# Patient Record
Sex: MALE | Race: WHITE | NOT HISPANIC OR LATINO | Employment: UNEMPLOYED | URBAN - METROPOLITAN AREA
[De-identification: names, ages, dates, MRNs, and addresses within clinical notes are randomized per-mention and may not be internally consistent; named-entity substitution may affect disease eponyms.]

---

## 2022-01-01 ENCOUNTER — APPOINTMENT (OUTPATIENT)
Dept: CT IMAGING | Facility: HOSPITAL | Age: 67
End: 2022-01-01

## 2022-01-01 ENCOUNTER — HOSPITAL ENCOUNTER (INPATIENT)
Facility: HOSPITAL | Age: 67
LOS: 2 days | DRG: 177 | End: 2022-12-25
Attending: INTERNAL MEDICINE | Admitting: HOSPITALIST
Payer: MEDICARE

## 2022-01-01 ENCOUNTER — ANESTHESIA (OUTPATIENT)
Dept: CARDIOLOGY | Facility: HOSPITAL | Age: 67
End: 2022-01-01

## 2022-01-01 ENCOUNTER — APPOINTMENT (OUTPATIENT)
Dept: OTHER | Facility: HOSPITAL | Age: 67
End: 2022-01-01

## 2022-01-01 ENCOUNTER — APPOINTMENT (OUTPATIENT)
Dept: CARDIOLOGY | Facility: HOSPITAL | Age: 67
End: 2022-01-01

## 2022-01-01 ENCOUNTER — HOSPITAL ENCOUNTER (OUTPATIENT)
Facility: HOSPITAL | Age: 67
Setting detail: HOSPITAL OUTPATIENT SURGERY
End: 2022-01-01
Attending: INTERNAL MEDICINE | Admitting: INTERNAL MEDICINE

## 2022-01-01 ENCOUNTER — READMISSION MANAGEMENT (OUTPATIENT)
Dept: CALL CENTER | Facility: HOSPITAL | Age: 67
End: 2022-01-01

## 2022-01-01 ENCOUNTER — HOSPITAL ENCOUNTER (INPATIENT)
Facility: HOSPITAL | Age: 67
LOS: 7 days | Discharge: HOME HEALTH | End: 2022-12-17
Attending: HOSPITALIST | Admitting: INTERNAL MEDICINE

## 2022-01-01 ENCOUNTER — PREP FOR SURGERY (OUTPATIENT)
Dept: OTHER | Facility: HOSPITAL | Age: 67
End: 2022-01-01

## 2022-01-01 ENCOUNTER — APPOINTMENT (OUTPATIENT)
Dept: GENERAL RADIOLOGY | Facility: HOSPITAL | Age: 67
DRG: 177 | End: 2022-01-01
Payer: MEDICARE

## 2022-01-01 ENCOUNTER — APPOINTMENT (OUTPATIENT)
Dept: GENERAL RADIOLOGY | Facility: HOSPITAL | Age: 67
End: 2022-01-01

## 2022-01-01 ENCOUNTER — ANESTHESIA EVENT (OUTPATIENT)
Dept: CARDIOLOGY | Facility: HOSPITAL | Age: 67
End: 2022-01-01

## 2022-01-01 VITALS
SYSTOLIC BLOOD PRESSURE: 100 MMHG | DIASTOLIC BLOOD PRESSURE: 65 MMHG | HEART RATE: 74 BPM | HEIGHT: 67 IN | WEIGHT: 151.68 LBS | RESPIRATION RATE: 12 BRPM | OXYGEN SATURATION: 94 % | BODY MASS INDEX: 23.81 KG/M2 | TEMPERATURE: 97.4 F

## 2022-01-01 VITALS
HEIGHT: 68 IN | OXYGEN SATURATION: 72 % | SYSTOLIC BLOOD PRESSURE: 105 MMHG | RESPIRATION RATE: 24 BRPM | DIASTOLIC BLOOD PRESSURE: 57 MMHG | BODY MASS INDEX: 23.92 KG/M2 | WEIGHT: 157.85 LBS | TEMPERATURE: 99.1 F | HEART RATE: 93 BPM

## 2022-01-01 DIAGNOSIS — I48.91 ATRIAL FIBRILLATION WITH RAPID VENTRICULAR RESPONSE: Primary | ICD-10-CM

## 2022-01-01 DIAGNOSIS — I48.91 ATRIAL FIBRILLATION WITH RAPID VENTRICULAR RESPONSE: ICD-10-CM

## 2022-01-01 DIAGNOSIS — I48.19 PERSISTENT ATRIAL FIBRILLATION: Primary | ICD-10-CM

## 2022-01-01 DIAGNOSIS — Z09 FOLLOW-UP EXAM: ICD-10-CM

## 2022-01-01 DIAGNOSIS — I46.9 CARDIAC ARREST: Primary | ICD-10-CM

## 2022-01-01 LAB
ALBUMIN SERPL-MCNC: 2.7 G/DL (ref 3.5–5.2)
ALBUMIN SERPL-MCNC: 2.9 G/DL (ref 3.5–5.2)
ALBUMIN/GLOB SERPL: 1 G/DL
ALBUMIN/GLOB SERPL: 1.1 G/DL
ALP SERPL-CCNC: 158 U/L (ref 39–117)
ALP SERPL-CCNC: 174 U/L (ref 39–117)
ALT SERPL W P-5'-P-CCNC: 12 U/L (ref 1–41)
ALT SERPL W P-5'-P-CCNC: 17 U/L (ref 1–41)
ANA SER QL IF: NEGATIVE
ANA SER QL: NEGATIVE
ANION GAP SERPL CALCULATED.3IONS-SCNC: 10 MMOL/L (ref 5–15)
ANION GAP SERPL CALCULATED.3IONS-SCNC: 12 MMOL/L (ref 5–15)
ANION GAP SERPL CALCULATED.3IONS-SCNC: 14 MMOL/L (ref 5–15)
ANION GAP SERPL CALCULATED.3IONS-SCNC: 6 MMOL/L (ref 5–15)
ANION GAP SERPL CALCULATED.3IONS-SCNC: 9 MMOL/L (ref 5–15)
ANISOCYTOSIS BLD QL: ABNORMAL
ANISOCYTOSIS BLD QL: ABNORMAL
APTT PPP: 25.9 SECONDS (ref 61–76.5)
APTT PPP: 26.2 SECONDS (ref 61–76.5)
APTT PPP: 27.2 SECONDS (ref 61–76.5)
APTT PPP: 29 SECONDS (ref 61–76.5)
APTT PPP: 29.7 SECONDS (ref 61–76.5)
APTT PPP: 29.9 SECONDS (ref 61–76.5)
APTT PPP: 35.2 SECONDS (ref 61–76.5)
APTT PPP: 39.3 SECONDS (ref 61–76.5)
APTT PPP: 44.1 SECONDS (ref 61–76.5)
APTT PPP: 47.6 SECONDS (ref 61–76.5)
APTT PPP: 51.5 SECONDS (ref 61–76.5)
APTT PPP: 63.2 SECONDS (ref 61–76.5)
APTT PPP: 76.6 SECONDS (ref 61–76.5)
ARTERIAL PATENCY WRIST A: ABNORMAL
ARTERIAL PATENCY WRIST A: ABNORMAL
ARTERIAL PATENCY WRIST A: POSITIVE
AST SERPL-CCNC: 21 U/L (ref 1–40)
AST SERPL-CCNC: 27 U/L (ref 1–40)
ATMOSPHERIC PRESS: ABNORMAL MM[HG]
BACTERIA SPEC RESP CULT: ABNORMAL
BACTERIA SPEC RESP CULT: ABNORMAL
BASE EXCESS BLDA CALC-SCNC: -4.2 MMOL/L (ref 0–3)
BASE EXCESS BLDA CALC-SCNC: -4.5 MMOL/L (ref 0–3)
BASE EXCESS BLDA CALC-SCNC: -8.2 MMOL/L (ref 0–3)
BASOPHILS # BLD AUTO: 0 10*3/MM3 (ref 0–0.2)
BASOPHILS # BLD AUTO: 0 10*3/MM3 (ref 0–0.2)
BASOPHILS NFR BLD AUTO: 0.1 % (ref 0–1.5)
BASOPHILS NFR BLD AUTO: 0.3 % (ref 0–1.5)
BDY SITE: ABNORMAL
BH CV ECHO MEAS - ACS: 1.2 CM
BH CV ECHO MEAS - AI P1/2T: 197 MSEC
BH CV ECHO MEAS - AO MAX PG: 15.6 MMHG
BH CV ECHO MEAS - AO MEAN PG: 9.6 MMHG
BH CV ECHO MEAS - AO ROOT DIAM: 3.4 CM
BH CV ECHO MEAS - AO V2 MAX: 197.8 CM/SEC
BH CV ECHO MEAS - AO V2 VTI: 38.6 CM
BH CV ECHO MEAS - AVA(I,D): 1.49 CM2
BH CV ECHO MEAS - EDV(CUBED): 89.9 ML
BH CV ECHO MEAS - EDV(MOD-SP4): 75.9 ML
BH CV ECHO MEAS - EF(MOD-BP): 44 %
BH CV ECHO MEAS - EF(MOD-SP4): 43.5 %
BH CV ECHO MEAS - ESV(CUBED): 42.9 ML
BH CV ECHO MEAS - ESV(MOD-SP4): 42.8 ML
BH CV ECHO MEAS - FS: 21.9 %
BH CV ECHO MEAS - IVS/LVPW: 0.89 CM
BH CV ECHO MEAS - IVSD: 0.83 CM
BH CV ECHO MEAS - LA DIMENSION: 4.1 CM
BH CV ECHO MEAS - LV DIASTOLIC VOL/BSA (35-75): 41.4 CM2
BH CV ECHO MEAS - LV MASS(C)D: 127.6 GRAMS
BH CV ECHO MEAS - LV MAX PG: 1.53 MMHG
BH CV ECHO MEAS - LV MEAN PG: 0.92 MMHG
BH CV ECHO MEAS - LV SYSTOLIC VOL/BSA (12-30): 23.4 CM2
BH CV ECHO MEAS - LV V1 MAX: 61.8 CM/SEC
BH CV ECHO MEAS - LV V1 VTI: 15.4 CM
BH CV ECHO MEAS - LVIDD: 4.5 CM
BH CV ECHO MEAS - LVIDS: 3.5 CM
BH CV ECHO MEAS - LVOT AREA: 3.7 CM2
BH CV ECHO MEAS - LVOT DIAM: 2.18 CM
BH CV ECHO MEAS - LVPWD: 0.93 CM
BH CV ECHO MEAS - MV A MAX VEL: 47.2 CM/SEC
BH CV ECHO MEAS - MV DEC TIME: 0.14 MSEC
BH CV ECHO MEAS - MV E MAX VEL: 36.3 CM/SEC
BH CV ECHO MEAS - MV E/A: 0.77
BH CV ECHO MEAS - PA V2 MAX: 81.8 CM/SEC
BH CV ECHO MEAS - RAP SYSTOLE: 8 MMHG
BH CV ECHO MEAS - RVDD: 3.6 CM
BH CV ECHO MEAS - RVSP: 22.6 MMHG
BH CV ECHO MEAS - SI(MOD-SP4): 18 ML/M2
BH CV ECHO MEAS - SV(LVOT): 57.6 ML
BH CV ECHO MEAS - SV(MOD-SP4): 33 ML
BH CV ECHO MEAS - TR MAX PG: 14.6 MMHG
BH CV ECHO MEAS - TR MAX PG: 53.5 MMHG
BH CV ECHO MEAS - TR MAX VEL: 191.4 CM/SEC
BH CV ECHO MEAS - TR MAX VEL: 355.9 CM/SEC
BH CV ECHO SHUNT ASSESSMENT PERFORMED (HIDDEN SCRIPTING): 1
BILIRUB SERPL-MCNC: 0.2 MG/DL (ref 0–1.2)
BILIRUB SERPL-MCNC: 0.4 MG/DL (ref 0–1.2)
BLASTS NFR BLD MANUAL: 3 % (ref 0–0)
BUN SERPL-MCNC: 12 MG/DL (ref 8–23)
BUN SERPL-MCNC: 13 MG/DL (ref 8–23)
BUN SERPL-MCNC: 17 MG/DL (ref 8–23)
BUN SERPL-MCNC: 18 MG/DL (ref 8–23)
BUN SERPL-MCNC: 22 MG/DL (ref 8–23)
BUN/CREAT SERPL: 10.9 (ref 7–25)
BUN/CREAT SERPL: 13 (ref 7–25)
BUN/CREAT SERPL: 14.8 (ref 7–25)
BUN/CREAT SERPL: 20 (ref 7–25)
BUN/CREAT SERPL: 21.2 (ref 7–25)
BURR CELLS BLD QL SMEAR: ABNORMAL
C-ANCA TITR SER IF: NORMAL TITER
C3 FRG RBC-MCNC: ABNORMAL
CA-I BLDA-SCNC: 1.22 MMOL/L (ref 1.15–1.33)
CALCIUM SPEC-SCNC: 8.2 MG/DL (ref 8.6–10.5)
CALCIUM SPEC-SCNC: 8.3 MG/DL (ref 8.6–10.5)
CALCIUM SPEC-SCNC: 8.4 MG/DL (ref 8.6–10.5)
CALCIUM SPEC-SCNC: 8.4 MG/DL (ref 8.6–10.5)
CALCIUM SPEC-SCNC: 8.5 MG/DL (ref 8.6–10.5)
CHLORIDE SERPL-SCNC: 101 MMOL/L (ref 98–107)
CHLORIDE SERPL-SCNC: 101 MMOL/L (ref 98–107)
CHLORIDE SERPL-SCNC: 97 MMOL/L (ref 98–107)
CHLORIDE SERPL-SCNC: 98 MMOL/L (ref 98–107)
CHLORIDE SERPL-SCNC: 99 MMOL/L (ref 98–107)
CHOLEST SERPL-MCNC: 101 MG/DL (ref 0–200)
CHROMATIN AB SERPL-ACNC: 475 IU/ML (ref 0–14)
CO2 BLDA-SCNC: 19.4 MMOL/L (ref 22–29)
CO2 BLDA-SCNC: 20.3 MMOL/L (ref 22–29)
CO2 SERPL-SCNC: 19 MMOL/L (ref 22–29)
CO2 SERPL-SCNC: 20 MMOL/L (ref 22–29)
CO2 SERPL-SCNC: 24 MMOL/L (ref 22–29)
CO2 SERPL-SCNC: 24 MMOL/L (ref 22–29)
CO2 SERPL-SCNC: 27 MMOL/L (ref 22–29)
CREAT SERPL-MCNC: 0.9 MG/DL (ref 0.76–1.27)
CREAT SERPL-MCNC: 1 MG/DL (ref 0.76–1.27)
CREAT SERPL-MCNC: 1.04 MG/DL (ref 0.76–1.27)
CREAT SERPL-MCNC: 1.1 MG/DL (ref 0.76–1.27)
CREAT SERPL-MCNC: 1.15 MG/DL (ref 0.76–1.27)
D-LACTATE SERPL-SCNC: 1.3 MMOL/L (ref 0.5–2)
D-LACTATE SERPL-SCNC: 6.3 MMOL/L (ref 0.5–2)
DACRYOCYTES BLD QL SMEAR: ABNORMAL
DACRYOCYTES BLD QL SMEAR: ABNORMAL
DEPRECATED RDW RBC AUTO: 53.4 FL (ref 37–54)
DEPRECATED RDW RBC AUTO: 54.3 FL (ref 37–54)
DEPRECATED RDW RBC AUTO: 55.1 FL (ref 37–54)
DEPRECATED RDW RBC AUTO: 55.6 FL (ref 37–54)
DEPRECATED RDW RBC AUTO: 58.2 FL (ref 37–54)
DEPRECATED RDW RBC AUTO: 59.9 FL (ref 37–54)
DEPRECATED RDW RBC AUTO: 62.6 FL (ref 37–54)
EGFRCR SERPLBLD CKD-EPI 2021: 69.3 ML/MIN/1.73
EGFRCR SERPLBLD CKD-EPI 2021: 73.1 ML/MIN/1.73
EGFRCR SERPLBLD CKD-EPI 2021: 78.2 ML/MIN/1.73
EGFRCR SERPLBLD CKD-EPI 2021: 82 ML/MIN/1.73
EGFRCR SERPLBLD CKD-EPI 2021: 93 ML/MIN/1.73
ENA SCL70 AB SER-ACNC: <0.2 AI (ref 0–0.9)
EOSINOPHIL # BLD AUTO: 0 10*3/MM3 (ref 0–0.4)
EOSINOPHIL # BLD AUTO: 0.3 10*3/MM3 (ref 0–0.4)
EOSINOPHIL NFR BLD AUTO: 0 % (ref 0.3–6.2)
EOSINOPHIL NFR BLD AUTO: 2 % (ref 0.3–6.2)
ERYTHROCYTE [DISTWIDTH] IN BLOOD BY AUTOMATED COUNT: 15.8 % (ref 12.3–15.4)
ERYTHROCYTE [DISTWIDTH] IN BLOOD BY AUTOMATED COUNT: 15.9 % (ref 12.3–15.4)
ERYTHROCYTE [DISTWIDTH] IN BLOOD BY AUTOMATED COUNT: 16 % (ref 12.3–15.4)
ERYTHROCYTE [DISTWIDTH] IN BLOOD BY AUTOMATED COUNT: 16.4 % (ref 12.3–15.4)
ERYTHROCYTE [DISTWIDTH] IN BLOOD BY AUTOMATED COUNT: 16.5 % (ref 12.3–15.4)
ERYTHROCYTE [DISTWIDTH] IN BLOOD BY AUTOMATED COUNT: 16.5 % (ref 12.3–15.4)
ERYTHROCYTE [DISTWIDTH] IN BLOOD BY AUTOMATED COUNT: 17.2 % (ref 12.3–15.4)
GLOBULIN UR ELPH-MCNC: 2.4 GM/DL
GLOBULIN UR ELPH-MCNC: 2.8 GM/DL
GLUCOSE BLDC GLUCOMTR-MCNC: 180 MG/DL (ref 74–100)
GLUCOSE BLDC GLUCOMTR-MCNC: 180 MG/DL (ref 74–100)
GLUCOSE SERPL-MCNC: 157 MG/DL (ref 65–99)
GLUCOSE SERPL-MCNC: 159 MG/DL (ref 65–99)
GLUCOSE SERPL-MCNC: 89 MG/DL (ref 65–99)
GLUCOSE SERPL-MCNC: 92 MG/DL (ref 65–99)
GLUCOSE SERPL-MCNC: 98 MG/DL (ref 65–99)
GRAM STN SPEC: ABNORMAL
HCO3 BLDA-SCNC: 15.5 MMOL/L (ref 21–28)
HCO3 BLDA-SCNC: 18.6 MMOL/L (ref 21–28)
HCO3 BLDA-SCNC: 19.4 MMOL/L (ref 21–28)
HCT VFR BLD AUTO: 23.1 % (ref 37.5–51)
HCT VFR BLD AUTO: 25.3 % (ref 37.5–51)
HCT VFR BLD AUTO: 26.2 % (ref 37.5–51)
HCT VFR BLD AUTO: 27.5 % (ref 37.5–51)
HCT VFR BLD AUTO: 27.8 % (ref 37.5–51)
HCT VFR BLD AUTO: 28.8 % (ref 37.5–51)
HCT VFR BLD AUTO: 30.1 % (ref 37.5–51)
HCT VFR BLDA CALC: 24 % (ref 38–51)
HDLC SERPL-MCNC: 36 MG/DL (ref 40–60)
HEMODILUTION: NO
HGB BLD-MCNC: 7.7 G/DL (ref 13–17.7)
HGB BLD-MCNC: 8.1 G/DL (ref 13–17.7)
HGB BLD-MCNC: 8.7 G/DL (ref 13–17.7)
HGB BLD-MCNC: 8.9 G/DL (ref 13–17.7)
HGB BLD-MCNC: 8.9 G/DL (ref 13–17.7)
HGB BLD-MCNC: 9.7 G/DL (ref 13–17.7)
HGB BLD-MCNC: 9.7 G/DL (ref 13–17.7)
HGB BLDA-MCNC: 8 G/DL (ref 12–17)
HOLD SPECIMEN: NORMAL
INHALED O2 CONCENTRATION: 100 %
INHALED O2 CONCENTRATION: 44 %
INHALED O2 CONCENTRATION: 90 %
INR PPP: 1.08 (ref 0.93–1.1)
IRON 24H UR-MRATE: 20 MCG/DL (ref 59–158)
IRON SATN MFR SERPL: 6 % (ref 20–50)
LAB AP CASE REPORT: NORMAL
LABORATORY COMMENT REPORT: NORMAL
LARGE PLATELETS: ABNORMAL
LDLC SERPL CALC-MCNC: 45 MG/DL (ref 0–100)
LDLC/HDLC SERPL: 1.21 {RATIO}
LV EF 2D ECHO EST: 40 %
LYMPHOCYTES # BLD AUTO: 0.9 10*3/MM3 (ref 0.7–3.1)
LYMPHOCYTES # BLD AUTO: 1.7 10*3/MM3 (ref 0.7–3.1)
LYMPHOCYTES # BLD MANUAL: 1.29 10*3/MM3 (ref 0.7–3.1)
LYMPHOCYTES # BLD MANUAL: 1.53 10*3/MM3 (ref 0.7–3.1)
LYMPHOCYTES # BLD MANUAL: 1.74 10*3/MM3 (ref 0.7–3.1)
LYMPHOCYTES # BLD MANUAL: 3.04 10*3/MM3 (ref 0.7–3.1)
LYMPHOCYTES # BLD MANUAL: 3.8 10*3/MM3 (ref 0.7–3.1)
LYMPHOCYTES NFR BLD AUTO: 12.9 % (ref 19.6–45.3)
LYMPHOCYTES NFR BLD AUTO: 5.7 % (ref 19.6–45.3)
LYMPHOCYTES NFR BLD MANUAL: 2 % (ref 5–12)
LYMPHOCYTES NFR BLD MANUAL: 2 % (ref 5–12)
LYMPHOCYTES NFR BLD MANUAL: 3 % (ref 5–12)
LYMPHOCYTES NFR BLD MANUAL: 5 % (ref 5–12)
LYMPHOCYTES NFR BLD MANUAL: 5 % (ref 5–12)
MAGNESIUM SERPL-MCNC: 1.5 MG/DL (ref 1.6–2.4)
MAGNESIUM SERPL-MCNC: 1.6 MG/DL (ref 1.6–2.4)
MAXIMAL PREDICTED HEART RATE: 152 BPM
MAXIMAL PREDICTED HEART RATE: 152 BPM
MCH RBC QN AUTO: 30.6 PG (ref 26.6–33)
MCH RBC QN AUTO: 30.7 PG (ref 26.6–33)
MCH RBC QN AUTO: 30.7 PG (ref 26.6–33)
MCH RBC QN AUTO: 31.1 PG (ref 26.6–33)
MCH RBC QN AUTO: 31.4 PG (ref 26.6–33)
MCH RBC QN AUTO: 31.6 PG (ref 26.6–33)
MCH RBC QN AUTO: 32.6 PG (ref 26.6–33)
MCHC RBC AUTO-ENTMCNC: 31.9 G/DL (ref 31.5–35.7)
MCHC RBC AUTO-ENTMCNC: 32 G/DL (ref 31.5–35.7)
MCHC RBC AUTO-ENTMCNC: 32.1 G/DL (ref 31.5–35.7)
MCHC RBC AUTO-ENTMCNC: 32.4 G/DL (ref 31.5–35.7)
MCHC RBC AUTO-ENTMCNC: 33.4 G/DL (ref 31.5–35.7)
MCHC RBC AUTO-ENTMCNC: 33.6 G/DL (ref 31.5–35.7)
MCHC RBC AUTO-ENTMCNC: 33.7 G/DL (ref 31.5–35.7)
MCV RBC AUTO: 93.7 FL (ref 79–97)
MCV RBC AUTO: 94.3 FL (ref 79–97)
MCV RBC AUTO: 95.2 FL (ref 79–97)
MCV RBC AUTO: 96 FL (ref 79–97)
MCV RBC AUTO: 96.2 FL (ref 79–97)
MCV RBC AUTO: 96.3 FL (ref 79–97)
MCV RBC AUTO: 96.9 FL (ref 79–97)
METAMYELOCYTES NFR BLD MANUAL: 1 % (ref 0–0)
METAMYELOCYTES NFR BLD MANUAL: 2 % (ref 0–0)
MODALITY: ABNORMAL
MONOCYTES # BLD AUTO: 0.8 10*3/MM3 (ref 0.1–0.9)
MONOCYTES # BLD AUTO: 1.1 10*3/MM3 (ref 0.1–0.9)
MONOCYTES # BLD: 0.43 10*3/MM3 (ref 0.1–0.9)
MONOCYTES # BLD: 0.44 10*3/MM3 (ref 0.1–0.9)
MONOCYTES # BLD: 0.47 10*3/MM3 (ref 0.1–0.9)
MONOCYTES # BLD: 0.58 10*3/MM3 (ref 0.1–0.9)
MONOCYTES # BLD: 0.95 10*3/MM3 (ref 0.1–0.9)
MONOCYTES NFR BLD AUTO: 5.6 % (ref 5–12)
MONOCYTES NFR BLD AUTO: 8.2 % (ref 5–12)
MRSA DNA SPEC QL NAA+PROBE: NORMAL
MYELOPEROXIDASE AB SER IA-ACNC: <0.2 UNITS (ref 0–0.9)
NEUTROPHILS # BLD AUTO: 12.58 10*3/MM3 (ref 1.7–7)
NEUTROPHILS # BLD AUTO: 14.25 10*3/MM3 (ref 1.7–7)
NEUTROPHILS # BLD AUTO: 19.49 10*3/MM3 (ref 1.7–7)
NEUTROPHILS # BLD AUTO: 19.66 10*3/MM3 (ref 1.7–7)
NEUTROPHILS # BLD AUTO: 8.82 10*3/MM3 (ref 1.7–7)
NEUTROPHILS NFR BLD AUTO: 10.2 10*3/MM3 (ref 1.7–7)
NEUTROPHILS NFR BLD AUTO: 13.5 10*3/MM3 (ref 1.7–7)
NEUTROPHILS NFR BLD AUTO: 76.6 % (ref 42.7–76)
NEUTROPHILS NFR BLD AUTO: 88.6 % (ref 42.7–76)
NEUTROPHILS NFR BLD MANUAL: 73 % (ref 42.7–76)
NEUTROPHILS NFR BLD MANUAL: 75 % (ref 42.7–76)
NEUTROPHILS NFR BLD MANUAL: 81 % (ref 42.7–76)
NEUTROPHILS NFR BLD MANUAL: 83 % (ref 42.7–76)
NEUTROPHILS NFR BLD MANUAL: 87 % (ref 42.7–76)
NEUTS BAND NFR BLD MANUAL: 1 % (ref 0–5)
NEUTS BAND NFR BLD MANUAL: 2 % (ref 0–5)
NEUTS BAND NFR BLD MANUAL: 8 % (ref 0–5)
NRBC BLD AUTO-RTO: 0 /100 WBC (ref 0–0.2)
NRBC BLD AUTO-RTO: 0.1 /100 WBC (ref 0–0.2)
NT-PROBNP SERPL-MCNC: 1110 PG/ML (ref 0–900)
NT-PROBNP SERPL-MCNC: 8457 PG/ML (ref 0–900)
OVALOCYTES BLD QL SMEAR: ABNORMAL
P-ANCA ATYPICAL TITR SER IF: NORMAL TITER
P-ANCA TITR SER IF: NORMAL TITER
PATH REPORT.FINAL DX SPEC: NORMAL
PATHOLOGY REVIEW: YES
PCO2 BLDA: 24.7 MM HG (ref 35–48)
PCO2 BLDA: 25.9 MM HG (ref 35–48)
PCO2 BLDA: 30.4 MM HG (ref 35–48)
PH BLDA: 7.41 PH UNITS (ref 7.35–7.45)
PH BLDA: 7.41 PH UNITS (ref 7.35–7.45)
PH BLDA: 7.46 PH UNITS (ref 7.35–7.45)
PLATELET # BLD AUTO: 266 10*3/MM3 (ref 140–450)
PLATELET # BLD AUTO: 268 10*3/MM3 (ref 140–450)
PLATELET # BLD AUTO: 285 10*3/MM3 (ref 140–450)
PLATELET # BLD AUTO: 300 10*3/MM3 (ref 140–450)
PLATELET # BLD AUTO: 305 10*3/MM3 (ref 140–450)
PLATELET # BLD AUTO: 317 10*3/MM3 (ref 140–450)
PLATELET # BLD AUTO: 338 10*3/MM3 (ref 140–450)
PMV BLD AUTO: 7.2 FL (ref 6–12)
PMV BLD AUTO: 7.4 FL (ref 6–12)
PMV BLD AUTO: 7.5 FL (ref 6–12)
PMV BLD AUTO: 7.5 FL (ref 6–12)
PMV BLD AUTO: 7.6 FL (ref 6–12)
PMV BLD AUTO: 7.7 FL (ref 6–12)
PMV BLD AUTO: 7.8 FL (ref 6–12)
PO2 BLDA: 38.5 MM HG (ref 83–108)
PO2 BLDA: 63.4 MM HG (ref 83–108)
PO2 BLDA: 72 MM HG (ref 83–108)
POIKILOCYTOSIS BLD QL SMEAR: ABNORMAL
POLYCHROMASIA BLD QL SMEAR: ABNORMAL
POLYCHROMASIA BLD QL SMEAR: ABNORMAL
POTASSIUM BLDA-SCNC: 4.5 MMOL/L (ref 3.5–4.5)
POTASSIUM SERPL-SCNC: 3.9 MMOL/L (ref 3.5–5.2)
POTASSIUM SERPL-SCNC: 4.1 MMOL/L (ref 3.5–5.2)
POTASSIUM SERPL-SCNC: 4.3 MMOL/L (ref 3.5–5.2)
POTASSIUM SERPL-SCNC: 4.4 MMOL/L (ref 3.5–5.2)
POTASSIUM SERPL-SCNC: 4.4 MMOL/L (ref 3.5–5.2)
PROCALCITONIN SERPL-MCNC: 0.11 NG/ML (ref 0–0.25)
PROMYELOCYTES NFR BLD MANUAL: 1 % (ref 0–0)
PROT SERPL-MCNC: 5.1 G/DL (ref 6–8.5)
PROT SERPL-MCNC: 5.7 G/DL (ref 6–8.5)
PROTEINASE3 AB SER IA-ACNC: <0.2 UNITS (ref 0–0.9)
PROTHROMBIN TIME: 11.1 SECONDS (ref 9.6–11.7)
QT INTERVAL: 377 MS
QT INTERVAL: 395 MS
QT INTERVAL: 398 MS
QT INTERVAL: 400 MS
QT INTERVAL: 487 MS
RBC # BLD AUTO: 2.38 10*6/MM3 (ref 4.14–5.8)
RBC # BLD AUTO: 2.63 10*6/MM3 (ref 4.14–5.8)
RBC # BLD AUTO: 2.78 10*6/MM3 (ref 4.14–5.8)
RBC # BLD AUTO: 2.86 10*6/MM3 (ref 4.14–5.8)
RBC # BLD AUTO: 2.92 10*6/MM3 (ref 4.14–5.8)
RBC # BLD AUTO: 3.07 10*6/MM3 (ref 4.14–5.8)
RBC # BLD AUTO: 3.14 10*6/MM3 (ref 4.14–5.8)
SAO2 % BLDCOA: 74.7 % (ref 94–98)
SAO2 % BLDCOA: 92.5 % (ref 94–98)
SAO2 % BLDCOA: 95.5 % (ref 94–98)
SCAN SLIDE: NORMAL
SMALL PLATELETS BLD QL SMEAR: ADEQUATE
SMALL PLATELETS BLD QL SMEAR: ADEQUATE
SODIUM BLD-SCNC: 133 MMOL/L (ref 138–146)
SODIUM SERPL-SCNC: 129 MMOL/L (ref 136–145)
SODIUM SERPL-SCNC: 131 MMOL/L (ref 136–145)
SODIUM SERPL-SCNC: 132 MMOL/L (ref 136–145)
SODIUM SERPL-SCNC: 134 MMOL/L (ref 136–145)
SODIUM SERPL-SCNC: 135 MMOL/L (ref 136–145)
STRESS TARGET HR: 129 BPM
STRESS TARGET HR: 129 BPM
TIBC SERPL-MCNC: 349 MCG/DL (ref 298–536)
TRANSFERRIN SERPL-MCNC: 234 MG/DL (ref 200–360)
TRIGL SERPL-MCNC: 107 MG/DL (ref 0–150)
TROPONIN T SERPL-MCNC: 0.03 NG/ML (ref 0–0.03)
TROPONIN T SERPL-MCNC: 0.04 NG/ML (ref 0–0.03)
TROPONIN T SERPL-MCNC: 0.06 NG/ML (ref 0–0.03)
TSH SERPL DL<=0.05 MIU/L-ACNC: 2.55 UIU/ML (ref 0.27–4.2)
VARIANT LYMPHS NFR BLD MANUAL: 1 % (ref 0–5)
VARIANT LYMPHS NFR BLD MANUAL: 1 % (ref 0–5)
VARIANT LYMPHS NFR BLD MANUAL: 11 % (ref 19.6–45.3)
VARIANT LYMPHS NFR BLD MANUAL: 14 % (ref 19.6–45.3)
VARIANT LYMPHS NFR BLD MANUAL: 2 % (ref 0–5)
VARIANT LYMPHS NFR BLD MANUAL: 2 % (ref 0–5)
VARIANT LYMPHS NFR BLD MANUAL: 20 % (ref 19.6–45.3)
VARIANT LYMPHS NFR BLD MANUAL: 5 % (ref 19.6–45.3)
VARIANT LYMPHS NFR BLD MANUAL: 8 % (ref 19.6–45.3)
VIT B12 BLD-MCNC: 1377 PG/ML (ref 211–946)
VLDLC SERPL-MCNC: 20 MG/DL (ref 5–40)
WBC MORPH BLD: NORMAL
WBC NRBC COR # BLD: 11.6 10*3/MM3 (ref 3.4–10.8)
WBC NRBC COR # BLD: 13.3 10*3/MM3 (ref 3.4–10.8)
WBC NRBC COR # BLD: 14.3 10*3/MM3 (ref 3.4–10.8)
WBC NRBC COR # BLD: 15.2 10*3/MM3 (ref 3.4–10.8)
WBC NRBC COR # BLD: 19 10*3/MM3 (ref 3.4–10.8)
WBC NRBC COR # BLD: 21.9 10*3/MM3 (ref 3.4–10.8)
WBC NRBC COR # BLD: 23.4 10*3/MM3 (ref 3.4–10.8)

## 2022-01-01 PROCEDURE — 80051 ELECTROLYTE PANEL: CPT

## 2022-01-01 PROCEDURE — 25010000002 MAGNESIUM SULFATE PER 500 MG OF MAGNESIUM

## 2022-01-01 PROCEDURE — 94799 UNLISTED PULMONARY SVC/PX: CPT

## 2022-01-01 PROCEDURE — 25010000002 EPINEPHRINE 1 MG/10ML SOLUTION PREFILLED SYRINGE

## 2022-01-01 PROCEDURE — 25010000002 VANCOMYCIN 10 G RECONSTITUTED SOLUTION: Performed by: HOSPITALIST

## 2022-01-01 PROCEDURE — 85025 COMPLETE CBC W/AUTO DIFF WBC: CPT | Performed by: HOSPITALIST

## 2022-01-01 PROCEDURE — 97116 GAIT TRAINING THERAPY: CPT

## 2022-01-01 PROCEDURE — 83880 ASSAY OF NATRIURETIC PEPTIDE: CPT | Performed by: NURSE PRACTITIONER

## 2022-01-01 PROCEDURE — 25010000002 METHYLPREDNISOLONE PER 40 MG: Performed by: INTERNAL MEDICINE

## 2022-01-01 PROCEDURE — 92960 CARDIOVERSION ELECTRIC EXT: CPT | Performed by: INTERNAL MEDICINE

## 2022-01-01 PROCEDURE — 85007 BL SMEAR W/DIFF WBC COUNT: CPT | Performed by: INTERNAL MEDICINE

## 2022-01-01 PROCEDURE — 25010000002 MAGNESIUM SULFATE 2 GM/50ML SOLUTION: Performed by: INTERNAL MEDICINE

## 2022-01-01 PROCEDURE — 84466 ASSAY OF TRANSFERRIN: CPT | Performed by: NURSE PRACTITIONER

## 2022-01-01 PROCEDURE — 94664 DEMO&/EVAL PT USE INHALER: CPT

## 2022-01-01 PROCEDURE — 83880 ASSAY OF NATRIURETIC PEPTIDE: CPT | Performed by: INTERNAL MEDICINE

## 2022-01-01 PROCEDURE — 25010000002 VANCOMYCIN 1 G RECONSTITUTED SOLUTION 1 EACH VIAL: Performed by: HOSPITALIST

## 2022-01-01 PROCEDURE — 85730 THROMBOPLASTIN TIME PARTIAL: CPT | Performed by: INTERNAL MEDICINE

## 2022-01-01 PROCEDURE — 71045 X-RAY EXAM CHEST 1 VIEW: CPT

## 2022-01-01 PROCEDURE — 87077 CULTURE AEROBIC IDENTIFY: CPT

## 2022-01-01 PROCEDURE — 94761 N-INVAS EAR/PLS OXIMETRY MLT: CPT

## 2022-01-01 PROCEDURE — 83605 ASSAY OF LACTIC ACID: CPT

## 2022-01-01 PROCEDURE — 84484 ASSAY OF TROPONIN QUANT: CPT | Performed by: NURSE PRACTITIONER

## 2022-01-01 PROCEDURE — 70450 CT HEAD/BRAIN W/O DYE: CPT

## 2022-01-01 PROCEDURE — 80048 BASIC METABOLIC PNL TOTAL CA: CPT | Performed by: NURSE PRACTITIONER

## 2022-01-01 PROCEDURE — 97110 THERAPEUTIC EXERCISES: CPT

## 2022-01-01 PROCEDURE — 80053 COMPREHEN METABOLIC PANEL: CPT | Performed by: INTERNAL MEDICINE

## 2022-01-01 PROCEDURE — 94618 PULMONARY STRESS TESTING: CPT

## 2022-01-01 PROCEDURE — 82330 ASSAY OF CALCIUM: CPT

## 2022-01-01 PROCEDURE — 25010000002 DIGOXIN PER 500 MCG: Performed by: INTERNAL MEDICINE

## 2022-01-01 PROCEDURE — 93010 ELECTROCARDIOGRAM REPORT: CPT | Performed by: INTERNAL MEDICINE

## 2022-01-01 PROCEDURE — 25010000002 MORPHINE PER 10 MG: Performed by: INTERNAL MEDICINE

## 2022-01-01 PROCEDURE — 87641 MR-STAPH DNA AMP PROBE: CPT | Performed by: HOSPITALIST

## 2022-01-01 PROCEDURE — 93312 ECHO TRANSESOPHAGEAL: CPT

## 2022-01-01 PROCEDURE — 80048 BASIC METABOLIC PNL TOTAL CA: CPT | Performed by: INTERNAL MEDICINE

## 2022-01-01 PROCEDURE — 25010000002 PROPOFOL 500 MG/50ML EMULSION: Performed by: NURSE ANESTHETIST, CERTIFIED REGISTERED

## 2022-01-01 PROCEDURE — 85025 COMPLETE CBC W/AUTO DIFF WBC: CPT | Performed by: NURSE PRACTITIONER

## 2022-01-01 PROCEDURE — 83036 HEMOGLOBIN GLYCOSYLATED A1C: CPT | Performed by: NURSE PRACTITIONER

## 2022-01-01 PROCEDURE — 87040 BLOOD CULTURE FOR BACTERIA: CPT | Performed by: HOSPITALIST

## 2022-01-01 PROCEDURE — 92960 CARDIOVERSION ELECTRIC EXT: CPT

## 2022-01-01 PROCEDURE — 85025 COMPLETE CBC W/AUTO DIFF WBC: CPT | Performed by: INTERNAL MEDICINE

## 2022-01-01 PROCEDURE — 93005 ELECTROCARDIOGRAM TRACING: CPT | Performed by: NURSE PRACTITIONER

## 2022-01-01 PROCEDURE — 83540 ASSAY OF IRON: CPT | Performed by: NURSE PRACTITIONER

## 2022-01-01 PROCEDURE — 93005 ELECTROCARDIOGRAM TRACING: CPT | Performed by: INTERNAL MEDICINE

## 2022-01-01 PROCEDURE — 5A12012 PERFORMANCE OF CARDIAC OUTPUT, SINGLE, MANUAL: ICD-10-PCS | Performed by: INTERNAL MEDICINE

## 2022-01-01 PROCEDURE — 25010000002 CEFEPIME PER 500 MG: Performed by: HOSPITALIST

## 2022-01-01 PROCEDURE — 63710000001 PREDNISONE PER 1 MG: Performed by: INTERNAL MEDICINE

## 2022-01-01 PROCEDURE — 0BH17EZ INSERTION OF ENDOTRACHEAL AIRWAY INTO TRACHEA, VIA NATURAL OR ARTIFICIAL OPENING: ICD-10-PCS | Performed by: INTERNAL MEDICINE

## 2022-01-01 PROCEDURE — 82607 VITAMIN B-12: CPT | Performed by: NURSE PRACTITIONER

## 2022-01-01 PROCEDURE — 36600 WITHDRAWAL OF ARTERIAL BLOOD: CPT

## 2022-01-01 PROCEDURE — 71250 CT THORAX DX C-: CPT

## 2022-01-01 PROCEDURE — 93312 ECHO TRANSESOPHAGEAL: CPT | Performed by: INTERNAL MEDICINE

## 2022-01-01 PROCEDURE — 83605 ASSAY OF LACTIC ACID: CPT | Performed by: HOSPITALIST

## 2022-01-01 PROCEDURE — 25010000002 SULFUR HEXAFLUORIDE MICROSPH 60.7-25 MG RECONSTITUTED SUSPENSION: Performed by: INTERNAL MEDICINE

## 2022-01-01 PROCEDURE — 92950 HEART/LUNG RESUSCITATION CPR: CPT

## 2022-01-01 PROCEDURE — 93325 DOPPLER ECHO COLOR FLOW MAPG: CPT | Performed by: INTERNAL MEDICINE

## 2022-01-01 PROCEDURE — 93306 TTE W/DOPPLER COMPLETE: CPT

## 2022-01-01 PROCEDURE — 94640 AIRWAY INHALATION TREATMENT: CPT

## 2022-01-01 PROCEDURE — 86235 NUCLEAR ANTIGEN ANTIBODY: CPT

## 2022-01-01 PROCEDURE — 86431 RHEUMATOID FACTOR QUANT: CPT

## 2022-01-01 PROCEDURE — 87186 SC STD MICRODIL/AGAR DIL: CPT

## 2022-01-01 PROCEDURE — 93325 DOPPLER ECHO COLOR FLOW MAPG: CPT

## 2022-01-01 PROCEDURE — 93005 ELECTROCARDIOGRAM TRACING: CPT | Performed by: HOSPITALIST

## 2022-01-01 PROCEDURE — 31500 INSERT EMERGENCY AIRWAY: CPT

## 2022-01-01 PROCEDURE — 80061 LIPID PANEL: CPT | Performed by: NURSE PRACTITIONER

## 2022-01-01 PROCEDURE — 83735 ASSAY OF MAGNESIUM: CPT | Performed by: NURSE PRACTITIONER

## 2022-01-01 PROCEDURE — 82803 BLOOD GASES ANY COMBINATION: CPT

## 2022-01-01 PROCEDURE — 97166 OT EVAL MOD COMPLEX 45 MIN: CPT

## 2022-01-01 PROCEDURE — 86038 ANTINUCLEAR ANTIBODIES: CPT

## 2022-01-01 PROCEDURE — 83516 IMMUNOASSAY NONANTIBODY: CPT

## 2022-01-01 PROCEDURE — 84145 PROCALCITONIN (PCT): CPT | Performed by: INTERNAL MEDICINE

## 2022-01-01 PROCEDURE — 25010000002 AMIODARONE IN DEXTROSE 5% 360-4.14 MG/200ML-% SOLUTION: Performed by: INTERNAL MEDICINE

## 2022-01-01 PROCEDURE — 93306 TTE W/DOPPLER COMPLETE: CPT | Performed by: INTERNAL MEDICINE

## 2022-01-01 PROCEDURE — 25010000002 HEPARIN (PORCINE) 25000-0.45 UT/250ML-% SOLUTION: Performed by: INTERNAL MEDICINE

## 2022-01-01 PROCEDURE — 97162 PT EVAL MOD COMPLEX 30 MIN: CPT

## 2022-01-01 PROCEDURE — 5A2204Z RESTORATION OF CARDIAC RHYTHM, SINGLE: ICD-10-PCS | Performed by: INTERNAL MEDICINE

## 2022-01-01 PROCEDURE — 85018 HEMOGLOBIN: CPT

## 2022-01-01 PROCEDURE — 84484 ASSAY OF TROPONIN QUANT: CPT | Performed by: INTERNAL MEDICINE

## 2022-01-01 PROCEDURE — 86037 ANCA TITER EACH ANTIBODY: CPT

## 2022-01-01 PROCEDURE — 99232 SBSQ HOSP IP/OBS MODERATE 35: CPT | Performed by: INTERNAL MEDICINE

## 2022-01-01 PROCEDURE — 82962 GLUCOSE BLOOD TEST: CPT

## 2022-01-01 PROCEDURE — 87205 SMEAR GRAM STAIN: CPT

## 2022-01-01 PROCEDURE — 25010000002 ONDANSETRON PER 1 MG: Performed by: INTERNAL MEDICINE

## 2022-01-01 PROCEDURE — 99221 1ST HOSP IP/OBS SF/LOW 40: CPT | Performed by: INTERNAL MEDICINE

## 2022-01-01 PROCEDURE — 85610 PROTHROMBIN TIME: CPT | Performed by: INTERNAL MEDICINE

## 2022-01-01 PROCEDURE — 87070 CULTURE OTHR SPECIMN AEROBIC: CPT

## 2022-01-01 PROCEDURE — 25010000002 AMIODARONE PER 30 MG

## 2022-01-01 PROCEDURE — 84443 ASSAY THYROID STIM HORMONE: CPT | Performed by: NURSE PRACTITIONER

## 2022-01-01 PROCEDURE — 80048 BASIC METABOLIC PNL TOTAL CA: CPT | Performed by: HOSPITALIST

## 2022-01-01 RX ORDER — PREGABALIN 75 MG/1
75 CAPSULE ORAL 2 TIMES DAILY
COMMUNITY

## 2022-01-01 RX ORDER — SULFAMETHOXAZOLE AND TRIMETHOPRIM 800; 160 MG/1; MG/1
1 TABLET ORAL EVERY 12 HOURS SCHEDULED
Status: DISCONTINUED | OUTPATIENT
Start: 2022-01-01 | End: 2022-01-01 | Stop reason: HOSPADM

## 2022-01-01 RX ORDER — IPRATROPIUM BROMIDE AND ALBUTEROL SULFATE 2.5; .5 MG/3ML; MG/3ML
3 SOLUTION RESPIRATORY (INHALATION)
Status: DISCONTINUED | OUTPATIENT
Start: 2022-01-01 | End: 2022-01-01

## 2022-01-01 RX ORDER — PROPOFOL 10 MG/ML
INJECTION, EMULSION INTRAVENOUS AS NEEDED
Status: DISCONTINUED | OUTPATIENT
Start: 2022-01-01 | End: 2022-01-01 | Stop reason: SURG

## 2022-01-01 RX ORDER — ROSUVASTATIN CALCIUM 10 MG/1
20 TABLET, COATED ORAL NIGHTLY
Status: DISCONTINUED | OUTPATIENT
Start: 2022-01-01 | End: 2022-01-01 | Stop reason: HOSPADM

## 2022-01-01 RX ORDER — FENTANYL CITRATE 50 UG/ML
INJECTION, SOLUTION INTRAMUSCULAR; INTRAVENOUS
Status: DISCONTINUED
Start: 2022-01-01 | End: 2022-01-01 | Stop reason: WASHOUT

## 2022-01-01 RX ORDER — ALBUTEROL SULFATE 2.5 MG/3ML
2.5 SOLUTION RESPIRATORY (INHALATION) EVERY 6 HOURS PRN
Status: DISCONTINUED | OUTPATIENT
Start: 2022-01-01 | End: 2022-01-01 | Stop reason: HOSPADM

## 2022-01-01 RX ORDER — SODIUM CHLORIDE 9 MG/ML
75 INJECTION, SOLUTION INTRAVENOUS CONTINUOUS
Status: DISCONTINUED | OUTPATIENT
Start: 2022-01-01 | End: 2022-01-01 | Stop reason: HOSPADM

## 2022-01-01 RX ORDER — PREDNISONE 1 MG/1
5 TABLET ORAL DAILY
COMMUNITY

## 2022-01-01 RX ORDER — EZETIMIBE 10 MG/1
10 TABLET ORAL EVERY EVENING
COMMUNITY

## 2022-01-01 RX ORDER — BUPROPION HYDROCHLORIDE 100 MG/1
100 TABLET, EXTENDED RELEASE ORAL DAILY
Status: DISCONTINUED | OUTPATIENT
Start: 2022-01-01 | End: 2022-01-01

## 2022-01-01 RX ORDER — DIGOXIN 0.25 MG/ML
500 INJECTION INTRAMUSCULAR; INTRAVENOUS ONCE
Status: COMPLETED | OUTPATIENT
Start: 2022-01-01 | End: 2022-01-01

## 2022-01-01 RX ORDER — BUPROPION HYDROCHLORIDE 100 MG/1
100 TABLET, EXTENDED RELEASE ORAL DAILY
Status: DISCONTINUED | OUTPATIENT
Start: 2022-01-01 | End: 2022-01-01 | Stop reason: HOSPADM

## 2022-01-01 RX ORDER — PREDNISONE 20 MG/1
40 TABLET ORAL
Status: DISCONTINUED | OUTPATIENT
Start: 2022-01-01 | End: 2022-01-01 | Stop reason: HOSPADM

## 2022-01-01 RX ORDER — SODIUM CHLORIDE 0.9 % (FLUSH) 0.9 %
10 SYRINGE (ML) INJECTION EVERY 12 HOURS SCHEDULED
Status: DISCONTINUED | OUTPATIENT
Start: 2022-01-01 | End: 2022-01-01 | Stop reason: HOSPADM

## 2022-01-01 RX ORDER — ROSUVASTATIN CALCIUM 20 MG/1
20 TABLET, COATED ORAL NIGHTLY
COMMUNITY

## 2022-01-01 RX ORDER — MAGNESIUM SULFATE HEPTAHYDRATE 40 MG/ML
2 INJECTION, SOLUTION INTRAVENOUS AS NEEDED
Status: DISCONTINUED | OUTPATIENT
Start: 2022-01-01 | End: 2022-01-01 | Stop reason: HOSPADM

## 2022-01-01 RX ORDER — DIGOXIN 0.25 MG/ML
250 INJECTION INTRAMUSCULAR; INTRAVENOUS ONCE
Status: COMPLETED | OUTPATIENT
Start: 2022-01-01 | End: 2022-01-01

## 2022-01-01 RX ORDER — ASPIRIN 81 MG/1
81 TABLET ORAL DAILY
Status: DISCONTINUED | OUTPATIENT
Start: 2022-01-01 | End: 2022-01-01 | Stop reason: HOSPADM

## 2022-01-01 RX ORDER — AMIODARONE HYDROCHLORIDE 200 MG/1
200 TABLET ORAL EVERY 12 HOURS SCHEDULED
Status: DISCONTINUED | OUTPATIENT
Start: 2022-01-01 | End: 2022-01-01 | Stop reason: HOSPADM

## 2022-01-01 RX ORDER — RANOLAZINE 1000 MG/1
1000 TABLET, EXTENDED RELEASE ORAL 2 TIMES DAILY
COMMUNITY

## 2022-01-01 RX ORDER — PREDNISONE 1 MG/1
5 TABLET ORAL DAILY
Status: DISCONTINUED | OUTPATIENT
Start: 2022-01-01 | End: 2022-01-01

## 2022-01-01 RX ORDER — PREGABALIN 75 MG/1
75 CAPSULE ORAL 2 TIMES DAILY
Status: DISCONTINUED | OUTPATIENT
Start: 2022-01-01 | End: 2022-01-01 | Stop reason: HOSPADM

## 2022-01-01 RX ORDER — LORAZEPAM 0.5 MG/1
0.5 TABLET ORAL EVERY 6 HOURS PRN
Status: DISCONTINUED | OUTPATIENT
Start: 2022-01-01 | End: 2022-01-01 | Stop reason: HOSPADM

## 2022-01-01 RX ORDER — PANTOPRAZOLE SODIUM 40 MG/1
40 TABLET, DELAYED RELEASE ORAL 2 TIMES DAILY
Status: DISCONTINUED | OUTPATIENT
Start: 2022-01-01 | End: 2022-01-01 | Stop reason: HOSPADM

## 2022-01-01 RX ORDER — ONDANSETRON 2 MG/ML
4 INJECTION INTRAMUSCULAR; INTRAVENOUS EVERY 6 HOURS PRN
Status: DISCONTINUED | OUTPATIENT
Start: 2022-01-01 | End: 2022-01-01 | Stop reason: HOSPADM

## 2022-01-01 RX ORDER — AMIODARONE HYDROCHLORIDE 50 MG/ML
INJECTION, SOLUTION INTRAVENOUS
Status: COMPLETED | OUTPATIENT
Start: 2022-01-01 | End: 2022-01-01

## 2022-01-01 RX ORDER — FLUOXETINE HYDROCHLORIDE 20 MG/1
40 CAPSULE ORAL DAILY
Status: DISCONTINUED | OUTPATIENT
Start: 2022-01-01 | End: 2022-01-01 | Stop reason: HOSPADM

## 2022-01-01 RX ORDER — GUAIFENESIN 600 MG/1
600 TABLET, EXTENDED RELEASE ORAL DAILY
COMMUNITY

## 2022-01-01 RX ORDER — MORPHINE SULFATE 2 MG/ML
2 INJECTION, SOLUTION INTRAMUSCULAR; INTRAVENOUS
Status: DISCONTINUED | OUTPATIENT
Start: 2022-01-01 | End: 2022-01-01 | Stop reason: SDUPTHER

## 2022-01-01 RX ORDER — GUAIFENESIN 600 MG/1
600 TABLET, EXTENDED RELEASE ORAL DAILY
Status: DISCONTINUED | OUTPATIENT
Start: 2022-01-01 | End: 2022-01-01 | Stop reason: HOSPADM

## 2022-01-01 RX ORDER — RANOLAZINE 500 MG/1
1000 TABLET, EXTENDED RELEASE ORAL 2 TIMES DAILY
Status: DISCONTINUED | OUTPATIENT
Start: 2022-01-01 | End: 2022-01-01 | Stop reason: HOSPADM

## 2022-01-01 RX ORDER — CALCIUM CARBONATE 200(500)MG
2 TABLET,CHEWABLE ORAL 3 TIMES DAILY PRN
Status: DISCONTINUED | OUTPATIENT
Start: 2022-01-01 | End: 2022-01-01 | Stop reason: HOSPADM

## 2022-01-01 RX ORDER — GUAIFENESIN 600 MG/1
600 TABLET, EXTENDED RELEASE ORAL DAILY
Status: DISCONTINUED | OUTPATIENT
Start: 2022-01-01 | End: 2022-01-01

## 2022-01-01 RX ORDER — MORPHINE SULFATE 2 MG/ML
2 INJECTION, SOLUTION INTRAMUSCULAR; INTRAVENOUS EVERY 4 HOURS PRN
Status: DISCONTINUED | OUTPATIENT
Start: 2022-01-01 | End: 2022-01-01 | Stop reason: HOSPADM

## 2022-01-01 RX ORDER — SODIUM CHLORIDE 9 MG/ML
40 INJECTION, SOLUTION INTRAVENOUS AS NEEDED
Status: DISCONTINUED | OUTPATIENT
Start: 2022-01-01 | End: 2022-01-01 | Stop reason: HOSPADM

## 2022-01-01 RX ORDER — DOCUSATE SODIUM 100 MG/1
100 CAPSULE, LIQUID FILLED ORAL DAILY PRN
Status: DISCONTINUED | OUTPATIENT
Start: 2022-01-01 | End: 2022-01-01 | Stop reason: HOSPADM

## 2022-01-01 RX ORDER — ACETAMINOPHEN 160 MG/5ML
650 SOLUTION ORAL EVERY 4 HOURS PRN
Status: DISCONTINUED | OUTPATIENT
Start: 2022-01-01 | End: 2022-01-01 | Stop reason: HOSPADM

## 2022-01-01 RX ORDER — MAGNESIUM SULFATE HEPTAHYDRATE 500 MG/ML
INJECTION, SOLUTION INTRAMUSCULAR; INTRAVENOUS
Status: COMPLETED | OUTPATIENT
Start: 2022-01-01 | End: 2022-01-01

## 2022-01-01 RX ORDER — LISINOPRIL 5 MG/1
5 TABLET ORAL DAILY
Qty: 30 TABLET | Refills: 2 | Status: SHIPPED | OUTPATIENT
Start: 2022-01-01 | End: 2023-03-17

## 2022-01-01 RX ORDER — MIDAZOLAM HYDROCHLORIDE 1 MG/ML
INJECTION INTRAMUSCULAR; INTRAVENOUS
Status: DISCONTINUED
Start: 2022-01-01 | End: 2022-01-01 | Stop reason: WASHOUT

## 2022-01-01 RX ORDER — SULFAMETHOXAZOLE AND TRIMETHOPRIM 800; 160 MG/1; MG/1
1 TABLET ORAL EVERY 12 HOURS SCHEDULED
Qty: 5 TABLET | Refills: 0 | Status: SHIPPED | OUTPATIENT
Start: 2022-01-01 | End: 2022-01-01

## 2022-01-01 RX ORDER — FERROUS SULFATE TAB EC 324 MG (65 MG FE EQUIVALENT) 324 (65 FE) MG
324 TABLET DELAYED RESPONSE ORAL
Status: DISCONTINUED | OUTPATIENT
Start: 2022-01-01 | End: 2022-01-01 | Stop reason: HOSPADM

## 2022-01-01 RX ORDER — SODIUM CHLORIDE 9 MG/ML
30 INJECTION, SOLUTION INTRAVENOUS CONTINUOUS
Status: DISCONTINUED | OUTPATIENT
Start: 2022-01-01 | End: 2022-01-01 | Stop reason: HOSPADM

## 2022-01-01 RX ORDER — TAMSULOSIN HYDROCHLORIDE 0.4 MG/1
0.4 CAPSULE ORAL DAILY
Status: DISCONTINUED | OUTPATIENT
Start: 2022-01-01 | End: 2022-01-01 | Stop reason: HOSPADM

## 2022-01-01 RX ORDER — BUPROPION HYDROCHLORIDE 100 MG/1
100 TABLET, EXTENDED RELEASE ORAL DAILY
COMMUNITY

## 2022-01-01 RX ORDER — LANOLIN ALCOHOL/MO/W.PET/CERES
1000 CREAM (GRAM) TOPICAL DAILY
Status: DISCONTINUED | OUTPATIENT
Start: 2022-01-01 | End: 2022-01-01 | Stop reason: HOSPADM

## 2022-01-01 RX ORDER — METHYLPREDNISOLONE SODIUM SUCCINATE 40 MG/ML
40 INJECTION, POWDER, LYOPHILIZED, FOR SOLUTION INTRAMUSCULAR; INTRAVENOUS EVERY 12 HOURS
Status: DISCONTINUED | OUTPATIENT
Start: 2022-01-01 | End: 2022-01-01

## 2022-01-01 RX ORDER — TAMSULOSIN HYDROCHLORIDE 0.4 MG/1
1 CAPSULE ORAL DAILY
COMMUNITY

## 2022-01-01 RX ORDER — CALCIUM CHLORIDE 100 MG/ML
INJECTION INTRAVENOUS; INTRAVENTRICULAR
Status: COMPLETED | OUTPATIENT
Start: 2022-01-01 | End: 2022-01-01

## 2022-01-01 RX ORDER — ETOMIDATE 2 MG/ML
INJECTION INTRAVENOUS
Status: DISCONTINUED
Start: 2022-01-01 | End: 2022-01-01 | Stop reason: WASHOUT

## 2022-01-01 RX ORDER — EPINEPHRINE 0.1 MG/ML
SYRINGE (ML) INJECTION
Status: COMPLETED | OUTPATIENT
Start: 2022-01-01 | End: 2022-01-01

## 2022-01-01 RX ORDER — FLUOXETINE HYDROCHLORIDE 20 MG/1
40 CAPSULE ORAL DAILY
Status: DISCONTINUED | OUTPATIENT
Start: 2022-01-01 | End: 2022-01-01

## 2022-01-01 RX ORDER — HEPARIN SODIUM 10000 [USP'U]/100ML
12 INJECTION, SOLUTION INTRAVENOUS
Status: DISCONTINUED | OUTPATIENT
Start: 2022-01-01 | End: 2022-01-01

## 2022-01-01 RX ORDER — SODIUM CHLORIDE 0.9 % (FLUSH) 0.9 %
10 SYRINGE (ML) INJECTION AS NEEDED
Status: DISCONTINUED | OUTPATIENT
Start: 2022-01-01 | End: 2022-01-01 | Stop reason: HOSPADM

## 2022-01-01 RX ORDER — POTASSIUM CHLORIDE 20 MEQ/1
40 TABLET, EXTENDED RELEASE ORAL AS NEEDED
Status: DISCONTINUED | OUTPATIENT
Start: 2022-01-01 | End: 2022-01-01 | Stop reason: HOSPADM

## 2022-01-01 RX ORDER — FLUOXETINE HYDROCHLORIDE 40 MG/1
40 CAPSULE ORAL DAILY
COMMUNITY

## 2022-01-01 RX ORDER — ACETAMINOPHEN 650 MG/1
650 SUPPOSITORY RECTAL EVERY 4 HOURS PRN
Status: DISCONTINUED | OUTPATIENT
Start: 2022-01-01 | End: 2022-01-01 | Stop reason: HOSPADM

## 2022-01-01 RX ORDER — LIDOCAINE HYDROCHLORIDE 20 MG/ML
INJECTION, SOLUTION EPIDURAL; INFILTRATION; INTRACAUDAL; PERINEURAL AS NEEDED
Status: DISCONTINUED | OUTPATIENT
Start: 2022-01-01 | End: 2022-01-01 | Stop reason: SURG

## 2022-01-01 RX ORDER — ASPIRIN 81 MG/1
81 TABLET ORAL DAILY
COMMUNITY

## 2022-01-01 RX ORDER — POTASSIUM CHLORIDE 1.5 G/1.77G
40 POWDER, FOR SOLUTION ORAL AS NEEDED
Status: DISCONTINUED | OUTPATIENT
Start: 2022-01-01 | End: 2022-01-01 | Stop reason: HOSPADM

## 2022-01-01 RX ORDER — SODIUM CHLORIDE 9 MG/ML
INJECTION, SOLUTION INTRAVENOUS CONTINUOUS PRN
Status: DISCONTINUED | OUTPATIENT
Start: 2022-01-01 | End: 2022-01-01 | Stop reason: SURG

## 2022-01-01 RX ORDER — PANTOPRAZOLE SODIUM 40 MG/1
40 TABLET, DELAYED RELEASE ORAL 2 TIMES DAILY
COMMUNITY

## 2022-01-01 RX ORDER — AMIODARONE HYDROCHLORIDE 200 MG/1
200 TABLET ORAL EVERY 12 HOURS SCHEDULED
Qty: 60 TABLET | Refills: 2 | Status: SHIPPED | OUTPATIENT
Start: 2022-01-01 | End: 2023-03-17

## 2022-01-01 RX ORDER — NITROGLYCERIN 0.4 MG/1
0.4 TABLET SUBLINGUAL
Status: DISCONTINUED | OUTPATIENT
Start: 2022-01-01 | End: 2022-01-01 | Stop reason: HOSPADM

## 2022-01-01 RX ORDER — AMIODARONE HYDROCHLORIDE 50 MG/ML
INJECTION, SOLUTION INTRAVENOUS
Status: DISCONTINUED
Start: 2022-01-01 | End: 2022-01-01 | Stop reason: HOSPADM

## 2022-01-01 RX ORDER — MAGNESIUM SULFATE HEPTAHYDRATE 40 MG/ML
4 INJECTION, SOLUTION INTRAVENOUS AS NEEDED
Status: DISCONTINUED | OUTPATIENT
Start: 2022-01-01 | End: 2022-01-01 | Stop reason: HOSPADM

## 2022-01-01 RX ORDER — IPRATROPIUM BROMIDE AND ALBUTEROL SULFATE 2.5; .5 MG/3ML; MG/3ML
3 SOLUTION RESPIRATORY (INHALATION) EVERY 6 HOURS PRN
Status: DISCONTINUED | OUTPATIENT
Start: 2022-01-01 | End: 2022-01-01 | Stop reason: HOSPADM

## 2022-01-01 RX ORDER — ASPIRIN 81 MG/1
81 TABLET ORAL DAILY
Status: DISCONTINUED | OUTPATIENT
Start: 2022-01-01 | End: 2022-01-01

## 2022-01-01 RX ORDER — HYDROCODONE BITARTRATE AND ACETAMINOPHEN 7.5; 325 MG/1; MG/1
1 TABLET ORAL EVERY 6 HOURS PRN
COMMUNITY

## 2022-01-01 RX ORDER — AMIODARONE HYDROCHLORIDE 200 MG/1
200 TABLET ORAL EVERY 12 HOURS SCHEDULED
Status: DISCONTINUED | OUTPATIENT
Start: 2022-01-01 | End: 2022-01-01

## 2022-01-01 RX ORDER — HYDROCODONE BITARTRATE AND ACETAMINOPHEN 10; 325 MG/1; MG/1
1 TABLET ORAL EVERY 4 HOURS PRN
Status: DISCONTINUED | OUTPATIENT
Start: 2022-01-01 | End: 2022-01-01 | Stop reason: HOSPADM

## 2022-01-01 RX ORDER — HYDROCODONE BITARTRATE AND ACETAMINOPHEN 7.5; 325 MG/1; MG/1
1 TABLET ORAL EVERY 6 HOURS PRN
Status: DISCONTINUED | OUTPATIENT
Start: 2022-01-01 | End: 2022-01-01 | Stop reason: HOSPADM

## 2022-01-01 RX ORDER — ACETAMINOPHEN 325 MG/1
650 TABLET ORAL EVERY 4 HOURS PRN
Status: DISCONTINUED | OUTPATIENT
Start: 2022-01-01 | End: 2022-01-01 | Stop reason: HOSPADM

## 2022-01-01 RX ORDER — DOCUSATE SODIUM 100 MG/1
100 CAPSULE, LIQUID FILLED ORAL DAILY PRN
COMMUNITY

## 2022-01-01 RX ORDER — FERROUS SULFATE 325(65) MG
325 TABLET ORAL
COMMUNITY

## 2022-01-01 RX ORDER — LANOLIN ALCOHOL/MO/W.PET/CERES
1000 CREAM (GRAM) TOPICAL DAILY
COMMUNITY

## 2022-01-01 RX ORDER — IPRATROPIUM BROMIDE AND ALBUTEROL SULFATE 2.5; .5 MG/3ML; MG/3ML
3 SOLUTION RESPIRATORY (INHALATION)
Status: DISCONTINUED | OUTPATIENT
Start: 2022-01-01 | End: 2022-01-01 | Stop reason: HOSPADM

## 2022-01-01 RX ORDER — ALBUTEROL SULFATE 90 UG/1
1 AEROSOL, METERED RESPIRATORY (INHALATION) EVERY 4 HOURS PRN
COMMUNITY

## 2022-01-01 RX ADMIN — RANOLAZINE 1000 MG: 500 TABLET, FILM COATED, EXTENDED RELEASE ORAL at 07:53

## 2022-01-01 RX ADMIN — AMIODARONE HYDROCHLORIDE 200 MG: 200 TABLET ORAL at 09:04

## 2022-01-01 RX ADMIN — FERROUS SULFATE TAB EC 324 MG (65 MG FE EQUIVALENT) 324 MG: 324 (65 FE) TABLET DELAYED RESPONSE at 10:03

## 2022-01-01 RX ADMIN — CEFEPIME 2 G: 2 INJECTION, POWDER, FOR SOLUTION INTRAVENOUS at 13:45

## 2022-01-01 RX ADMIN — CEFEPIME 2 G: 2 INJECTION, POWDER, FOR SOLUTION INTRAVENOUS at 05:30

## 2022-01-01 RX ADMIN — Medication 10 ML: at 10:00

## 2022-01-01 RX ADMIN — GUAIFENESIN 600 MG: 600 TABLET, EXTENDED RELEASE ORAL at 10:54

## 2022-01-01 RX ADMIN — MORPHINE SULFATE 2 MG: 2 INJECTION, SOLUTION INTRAMUSCULAR; INTRAVENOUS at 05:24

## 2022-01-01 RX ADMIN — ASPIRIN 81 MG: 81 TABLET, COATED ORAL at 08:50

## 2022-01-01 RX ADMIN — TAMSULOSIN HYDROCHLORIDE 0.4 MG: 0.4 CAPSULE ORAL at 08:49

## 2022-01-01 RX ADMIN — FLUOXETINE 40 MG: 20 CAPSULE ORAL at 08:49

## 2022-01-01 RX ADMIN — PROPOFOL 50 MG: 10 INJECTION, EMULSION INTRAVENOUS at 07:45

## 2022-01-01 RX ADMIN — SODIUM CHLORIDE 30 ML/HR: 9 INJECTION, SOLUTION INTRAVENOUS at 07:13

## 2022-01-01 RX ADMIN — FERROUS SULFATE TAB EC 324 MG (65 MG FE EQUIVALENT) 324 MG: 324 (65 FE) TABLET DELAYED RESPONSE at 10:54

## 2022-01-01 RX ADMIN — GUAIFENESIN 600 MG: 600 TABLET, EXTENDED RELEASE ORAL at 08:47

## 2022-01-01 RX ADMIN — ASPIRIN 81 MG: 81 TABLET, COATED ORAL at 10:02

## 2022-01-01 RX ADMIN — AMIODARONE HYDROCHLORIDE 200 MG: 200 TABLET ORAL at 21:05

## 2022-01-01 RX ADMIN — IPRATROPIUM BROMIDE AND ALBUTEROL SULFATE 3 ML: 2.5; .5 SOLUTION RESPIRATORY (INHALATION) at 15:03

## 2022-01-01 RX ADMIN — Medication 10 ML: at 21:21

## 2022-01-01 RX ADMIN — IPRATROPIUM BROMIDE AND ALBUTEROL SULFATE 3 ML: 2.5; .5 SOLUTION RESPIRATORY (INHALATION) at 15:00

## 2022-01-01 RX ADMIN — PANTOPRAZOLE SODIUM 40 MG: 40 TABLET, DELAYED RELEASE ORAL at 08:10

## 2022-01-01 RX ADMIN — EPINEPHRINE 1 MG: 0.1 INJECTION, SOLUTION ENDOTRACHEAL; INTRACARDIAC; INTRAVENOUS at 00:26

## 2022-01-01 RX ADMIN — AMIODARONE HYDROCHLORIDE 200 MG: 200 TABLET ORAL at 08:06

## 2022-01-01 RX ADMIN — MAGNESIUM SULFATE HEPTAHYDRATE 2 G: 2 INJECTION, SOLUTION INTRAVENOUS at 17:21

## 2022-01-01 RX ADMIN — CEFEPIME 2 G: 2 INJECTION, POWDER, FOR SOLUTION INTRAVENOUS at 21:04

## 2022-01-01 RX ADMIN — SODIUM CHLORIDE 75 ML/HR: 9 INJECTION, SOLUTION INTRAVENOUS at 13:59

## 2022-01-01 RX ADMIN — AMIODARONE HYDROCHLORIDE 200 MG: 200 TABLET ORAL at 10:55

## 2022-01-01 RX ADMIN — PROPOFOL 50 MG: 10 INJECTION, EMULSION INTRAVENOUS at 07:46

## 2022-01-01 RX ADMIN — ROSUVASTATIN 20 MG: 10 TABLET, FILM COATED ORAL at 20:31

## 2022-01-01 RX ADMIN — DIGOXIN 250 MCG: 0.25 INJECTION INTRAMUSCULAR; INTRAVENOUS at 21:19

## 2022-01-01 RX ADMIN — NYSTATIN 500000 UNITS: 100000 SUSPENSION ORAL at 20:02

## 2022-01-01 RX ADMIN — PANTOPRAZOLE SODIUM 40 MG: 40 TABLET, DELAYED RELEASE ORAL at 10:54

## 2022-01-01 RX ADMIN — IPRATROPIUM BROMIDE AND ALBUTEROL SULFATE 3 ML: .5; 3 SOLUTION RESPIRATORY (INHALATION) at 20:06

## 2022-01-01 RX ADMIN — BUPROPION HYDROCHLORIDE 100 MG: 100 TABLET, FILM COATED, EXTENDED RELEASE ORAL at 08:15

## 2022-01-01 RX ADMIN — PANTOPRAZOLE SODIUM 40 MG: 40 TABLET, DELAYED RELEASE ORAL at 21:05

## 2022-01-01 RX ADMIN — BUPROPION HYDROCHLORIDE 100 MG: 100 TABLET, FILM COATED, EXTENDED RELEASE ORAL at 10:02

## 2022-01-01 RX ADMIN — FERROUS SULFATE TAB EC 324 MG (65 MG FE EQUIVALENT) 324 MG: 324 (65 FE) TABLET DELAYED RESPONSE at 08:10

## 2022-01-01 RX ADMIN — RANOLAZINE 1000 MG: 500 TABLET, FILM COATED, EXTENDED RELEASE ORAL at 21:20

## 2022-01-01 RX ADMIN — TAMSULOSIN HYDROCHLORIDE 0.4 MG: 0.4 CAPSULE ORAL at 08:47

## 2022-01-01 RX ADMIN — IPRATROPIUM BROMIDE AND ALBUTEROL SULFATE 3 ML: 2.5; .5 SOLUTION RESPIRATORY (INHALATION) at 19:17

## 2022-01-01 RX ADMIN — IPRATROPIUM BROMIDE AND ALBUTEROL SULFATE 3 ML: 2.5; .5 SOLUTION RESPIRATORY (INHALATION) at 11:40

## 2022-01-01 RX ADMIN — SULFAMETHOXAZOLE AND TRIMETHOPRIM 1 TABLET: 800; 160 TABLET ORAL at 20:11

## 2022-01-01 RX ADMIN — METHYLPREDNISOLONE SODIUM SUCCINATE 40 MG: 40 INJECTION, POWDER, FOR SOLUTION INTRAMUSCULAR; INTRAVENOUS at 05:37

## 2022-01-01 RX ADMIN — RANOLAZINE 1000 MG: 500 TABLET, FILM COATED, EXTENDED RELEASE ORAL at 20:34

## 2022-01-01 RX ADMIN — BUPROPION HYDROCHLORIDE 100 MG: 100 TABLET, FILM COATED, EXTENDED RELEASE ORAL at 08:09

## 2022-01-01 RX ADMIN — RIVAROXABAN 20 MG: 20 TABLET, FILM COATED ORAL at 15:38

## 2022-01-01 RX ADMIN — FLUOXETINE 40 MG: 20 CAPSULE ORAL at 08:15

## 2022-01-01 RX ADMIN — PREGABALIN 75 MG: 75 CAPSULE ORAL at 21:20

## 2022-01-01 RX ADMIN — Medication 10 ML: at 08:16

## 2022-01-01 RX ADMIN — IPRATROPIUM BROMIDE AND ALBUTEROL SULFATE 3 ML: 2.5; .5 SOLUTION RESPIRATORY (INHALATION) at 15:10

## 2022-01-01 RX ADMIN — MAGNESIUM SULFATE HEPTAHYDRATE 1 G: 500 INJECTION, SOLUTION INTRAMUSCULAR; INTRAVENOUS at 00:42

## 2022-01-01 RX ADMIN — HYDROCODONE BITARTRATE AND ACETAMINOPHEN 1 TABLET: 10; 325 TABLET ORAL at 08:05

## 2022-01-01 RX ADMIN — SULFAMETHOXAZOLE AND TRIMETHOPRIM 1 TABLET: 800; 160 TABLET ORAL at 08:15

## 2022-01-01 RX ADMIN — ASPIRIN 81 MG: 81 TABLET, COATED ORAL at 08:06

## 2022-01-01 RX ADMIN — PANTOPRAZOLE SODIUM 40 MG: 40 TABLET, DELAYED RELEASE ORAL at 10:03

## 2022-01-01 RX ADMIN — HYDROCODONE BITARTRATE AND ACETAMINOPHEN 1 TABLET: 10; 325 TABLET ORAL at 23:55

## 2022-01-01 RX ADMIN — METHYLPREDNISOLONE SODIUM SUCCINATE 40 MG: 40 INJECTION, POWDER, FOR SOLUTION INTRAMUSCULAR; INTRAVENOUS at 05:21

## 2022-01-01 RX ADMIN — IPRATROPIUM BROMIDE AND ALBUTEROL SULFATE 3 ML: .5; 3 SOLUTION RESPIRATORY (INHALATION) at 12:05

## 2022-01-01 RX ADMIN — PREGABALIN 75 MG: 75 CAPSULE ORAL at 10:02

## 2022-01-01 RX ADMIN — FLUOXETINE 40 MG: 20 CAPSULE ORAL at 08:07

## 2022-01-01 RX ADMIN — VANCOMYCIN HYDROCHLORIDE 1500 MG: 10 INJECTION, POWDER, LYOPHILIZED, FOR SOLUTION INTRAVENOUS at 15:53

## 2022-01-01 RX ADMIN — FERROUS SULFATE TAB EC 324 MG (65 MG FE EQUIVALENT) 324 MG: 324 (65 FE) TABLET DELAYED RESPONSE at 08:47

## 2022-01-01 RX ADMIN — AMIODARONE HYDROCHLORIDE 200 MG: 200 TABLET ORAL at 21:20

## 2022-01-01 RX ADMIN — IPRATROPIUM BROMIDE AND ALBUTEROL SULFATE 3 ML: 2.5; .5 SOLUTION RESPIRATORY (INHALATION) at 14:50

## 2022-01-01 RX ADMIN — RANOLAZINE 1000 MG: 500 TABLET, FILM COATED, EXTENDED RELEASE ORAL at 08:49

## 2022-01-01 RX ADMIN — ACETAMINOPHEN 650 MG: 325 TABLET, FILM COATED ORAL at 05:34

## 2022-01-01 RX ADMIN — METHYLPREDNISOLONE SODIUM SUCCINATE 40 MG: 40 INJECTION, POWDER, FOR SOLUTION INTRAMUSCULAR; INTRAVENOUS at 17:59

## 2022-01-01 RX ADMIN — PANTOPRAZOLE SODIUM 40 MG: 40 TABLET, DELAYED RELEASE ORAL at 20:02

## 2022-01-01 RX ADMIN — ROSUVASTATIN 20 MG: 10 TABLET, FILM COATED ORAL at 21:05

## 2022-01-01 RX ADMIN — IPRATROPIUM BROMIDE AND ALBUTEROL SULFATE 3 ML: 2.5; .5 SOLUTION RESPIRATORY (INHALATION) at 10:48

## 2022-01-01 RX ADMIN — RIVAROXABAN 20 MG: 20 TABLET, FILM COATED ORAL at 17:22

## 2022-01-01 RX ADMIN — FERROUS SULFATE TAB EC 324 MG (65 MG FE EQUIVALENT) 324 MG: 324 (65 FE) TABLET DELAYED RESPONSE at 08:16

## 2022-01-01 RX ADMIN — Medication 10 ML: at 20:31

## 2022-01-01 RX ADMIN — METOPROLOL TARTRATE 25 MG: 25 TABLET, FILM COATED ORAL at 08:09

## 2022-01-01 RX ADMIN — HYDROCODONE BITARTRATE AND ACETAMINOPHEN 1 TABLET: 10; 325 TABLET ORAL at 04:02

## 2022-01-01 RX ADMIN — PROPOFOL 50 MG: 10 INJECTION, EMULSION INTRAVENOUS at 07:49

## 2022-01-01 RX ADMIN — AMIODARONE HYDROCHLORIDE 200 MG: 200 TABLET ORAL at 08:16

## 2022-01-01 RX ADMIN — HYDROCODONE BITARTRATE AND ACETAMINOPHEN 1 TABLET: 10; 325 TABLET ORAL at 08:53

## 2022-01-01 RX ADMIN — RIVAROXABAN 20 MG: 20 TABLET, FILM COATED ORAL at 10:55

## 2022-01-01 RX ADMIN — RANOLAZINE 1000 MG: 500 TABLET, FILM COATED, EXTENDED RELEASE ORAL at 10:56

## 2022-01-01 RX ADMIN — SULFAMETHOXAZOLE AND TRIMETHOPRIM 1 TABLET: 800; 160 TABLET ORAL at 08:49

## 2022-01-01 RX ADMIN — PANTOPRAZOLE SODIUM 40 MG: 40 TABLET, DELAYED RELEASE ORAL at 08:49

## 2022-01-01 RX ADMIN — TAMSULOSIN HYDROCHLORIDE 0.4 MG: 0.4 CAPSULE ORAL at 08:07

## 2022-01-01 RX ADMIN — PANTOPRAZOLE SODIUM 40 MG: 40 TABLET, DELAYED RELEASE ORAL at 20:32

## 2022-01-01 RX ADMIN — PROPOFOL 50 MG: 10 INJECTION, EMULSION INTRAVENOUS at 07:43

## 2022-01-01 RX ADMIN — PANTOPRAZOLE SODIUM 40 MG: 40 TABLET, DELAYED RELEASE ORAL at 08:15

## 2022-01-01 RX ADMIN — CYANOCOBALAMIN TAB 1000 MCG 1000 MCG: 1000 TAB at 08:49

## 2022-01-01 RX ADMIN — CEFEPIME 2 G: 2 INJECTION, POWDER, FOR SOLUTION INTRAVENOUS at 15:20

## 2022-01-01 RX ADMIN — HYDROCODONE BITARTRATE AND ACETAMINOPHEN 1 TABLET: 10; 325 TABLET ORAL at 03:26

## 2022-01-01 RX ADMIN — EPINEPHRINE 1 MG: 0.1 INJECTION, SOLUTION ENDOTRACHEAL; INTRACARDIAC; INTRAVENOUS at 00:32

## 2022-01-01 RX ADMIN — CYANOCOBALAMIN TAB 1000 MCG 1000 MCG: 1000 TAB at 08:16

## 2022-01-01 RX ADMIN — RANOLAZINE 1000 MG: 500 TABLET, FILM COATED, EXTENDED RELEASE ORAL at 08:06

## 2022-01-01 RX ADMIN — HYDROCODONE BITARTRATE AND ACETAMINOPHEN 1 TABLET: 10; 325 TABLET ORAL at 15:55

## 2022-01-01 RX ADMIN — MAGNESIUM SULFATE HEPTAHYDRATE 2 G: 2 INJECTION, SOLUTION INTRAVENOUS at 13:45

## 2022-01-01 RX ADMIN — LIDOCAINE HYDROCHLORIDE 100 MG: 20 INJECTION, SOLUTION EPIDURAL; INFILTRATION; INTRACAUDAL; PERINEURAL at 07:43

## 2022-01-01 RX ADMIN — FLUOXETINE 40 MG: 20 CAPSULE ORAL at 09:04

## 2022-01-01 RX ADMIN — RANOLAZINE 1000 MG: 500 TABLET, FILM COATED, EXTENDED RELEASE ORAL at 20:31

## 2022-01-01 RX ADMIN — NYSTATIN 500000 UNITS: 100000 SUSPENSION ORAL at 08:15

## 2022-01-01 RX ADMIN — IPRATROPIUM BROMIDE AND ALBUTEROL SULFATE 3 ML: 2.5; .5 SOLUTION RESPIRATORY (INHALATION) at 11:16

## 2022-01-01 RX ADMIN — RANOLAZINE 1000 MG: 500 TABLET, FILM COATED, EXTENDED RELEASE ORAL at 08:16

## 2022-01-01 RX ADMIN — PREGABALIN 75 MG: 75 CAPSULE ORAL at 09:04

## 2022-01-01 RX ADMIN — IPRATROPIUM BROMIDE AND ALBUTEROL SULFATE 3 ML: 2.5; .5 SOLUTION RESPIRATORY (INHALATION) at 18:59

## 2022-01-01 RX ADMIN — HEPARIN SODIUM 12 UNITS/KG/HR: 10000 INJECTION, SOLUTION INTRAVENOUS at 00:57

## 2022-01-01 RX ADMIN — HYDROCODONE BITARTRATE AND ACETAMINOPHEN 1 TABLET: 10; 325 TABLET ORAL at 20:32

## 2022-01-01 RX ADMIN — PREGABALIN 75 MG: 75 CAPSULE ORAL at 20:39

## 2022-01-01 RX ADMIN — CALCIUM CHLORIDE 1 G: 100 INJECTION, SOLUTION INTRAVENOUS at 00:28

## 2022-01-01 RX ADMIN — HEPARIN SODIUM 19 UNITS/KG/HR: 10000 INJECTION, SOLUTION INTRAVENOUS at 15:22

## 2022-01-01 RX ADMIN — RANOLAZINE 1000 MG: 500 TABLET, FILM COATED, EXTENDED RELEASE ORAL at 21:05

## 2022-01-01 RX ADMIN — CALCIUM CARBONATE (ANTACID) CHEW TAB 500 MG 2 TABLET: 500 CHEW TAB at 22:26

## 2022-01-01 RX ADMIN — HYDROCODONE BITARTRATE AND ACETAMINOPHEN 1 TABLET: 10; 325 TABLET ORAL at 23:32

## 2022-01-01 RX ADMIN — PREDNISONE 40 MG: 20 TABLET ORAL at 09:04

## 2022-01-01 RX ADMIN — AMIODARONE HYDROCHLORIDE 200 MG: 200 TABLET ORAL at 20:32

## 2022-01-01 RX ADMIN — PREGABALIN 75 MG: 75 CAPSULE ORAL at 20:31

## 2022-01-01 RX ADMIN — HYDROCODONE BITARTRATE AND ACETAMINOPHEN 1 TABLET: 7.5; 325 TABLET ORAL at 19:37

## 2022-01-01 RX ADMIN — BUPROPION HYDROCHLORIDE 100 MG: 100 TABLET, FILM COATED, EXTENDED RELEASE ORAL at 08:05

## 2022-01-01 RX ADMIN — PANTOPRAZOLE SODIUM 40 MG: 40 TABLET, DELAYED RELEASE ORAL at 21:21

## 2022-01-01 RX ADMIN — SULFUR HEXAFLUORIDE 3 ML: KIT at 13:03

## 2022-01-01 RX ADMIN — DIGOXIN 250 MCG: 0.25 INJECTION INTRAMUSCULAR; INTRAVENOUS at 02:23

## 2022-01-01 RX ADMIN — Medication 10 ML: at 20:32

## 2022-01-01 RX ADMIN — SODIUM CHLORIDE: 0.9 INJECTION, SOLUTION INTRAVENOUS at 07:39

## 2022-01-01 RX ADMIN — PREGABALIN 75 MG: 75 CAPSULE ORAL at 08:47

## 2022-01-01 RX ADMIN — CYANOCOBALAMIN TAB 1000 MCG 1000 MCG: 1000 TAB at 09:04

## 2022-01-01 RX ADMIN — Medication 10 ML: at 09:04

## 2022-01-01 RX ADMIN — Medication 10 ML: at 17:59

## 2022-01-01 RX ADMIN — NYSTATIN 500000 UNITS: 100000 SUSPENSION ORAL at 13:16

## 2022-01-01 RX ADMIN — HYDROCODONE BITARTRATE AND ACETAMINOPHEN 1 TABLET: 10; 325 TABLET ORAL at 13:16

## 2022-01-01 RX ADMIN — PREGABALIN 75 MG: 75 CAPSULE ORAL at 20:02

## 2022-01-01 RX ADMIN — MAGNESIUM SULFATE HEPTAHYDRATE 2 G: 2 INJECTION, SOLUTION INTRAVENOUS at 15:11

## 2022-01-01 RX ADMIN — Medication 10 ML: at 20:03

## 2022-01-01 RX ADMIN — SODIUM BICARBONATE 50 MEQ: 84 INJECTION, SOLUTION INTRAVENOUS at 00:31

## 2022-01-01 RX ADMIN — IPRATROPIUM BROMIDE AND ALBUTEROL SULFATE 3 ML: 2.5; .5 SOLUTION RESPIRATORY (INHALATION) at 19:22

## 2022-01-01 RX ADMIN — Medication 10 ML: at 08:07

## 2022-01-01 RX ADMIN — ROSUVASTATIN 20 MG: 10 TABLET, FILM COATED ORAL at 21:20

## 2022-01-01 RX ADMIN — TAMSULOSIN HYDROCHLORIDE 0.4 MG: 0.4 CAPSULE ORAL at 08:15

## 2022-01-01 RX ADMIN — HYDROCODONE BITARTRATE AND ACETAMINOPHEN 1 TABLET: 10; 325 TABLET ORAL at 20:09

## 2022-01-01 RX ADMIN — NYSTATIN 500000 UNITS: 100000 SUSPENSION ORAL at 15:37

## 2022-01-01 RX ADMIN — PREGABALIN 75 MG: 75 CAPSULE ORAL at 20:34

## 2022-01-01 RX ADMIN — CYANOCOBALAMIN TAB 1000 MCG 1000 MCG: 1000 TAB at 08:47

## 2022-01-01 RX ADMIN — ROSUVASTATIN 20 MG: 10 TABLET, FILM COATED ORAL at 20:11

## 2022-01-01 RX ADMIN — BUPROPION HYDROCHLORIDE 100 MG: 100 TABLET, FILM COATED, EXTENDED RELEASE ORAL at 08:49

## 2022-01-01 RX ADMIN — TAMSULOSIN HYDROCHLORIDE 0.4 MG: 0.4 CAPSULE ORAL at 09:04

## 2022-01-01 RX ADMIN — Medication 10 ML: at 10:56

## 2022-01-01 RX ADMIN — SODIUM BICARBONATE 50 MEQ: 84 INJECTION, SOLUTION INTRAVENOUS at 00:27

## 2022-01-01 RX ADMIN — SULFAMETHOXAZOLE AND TRIMETHOPRIM 1 TABLET: 800; 160 TABLET ORAL at 08:47

## 2022-01-01 RX ADMIN — PREDNISONE 40 MG: 20 TABLET ORAL at 08:50

## 2022-01-01 RX ADMIN — FERROUS SULFATE TAB EC 324 MG (65 MG FE EQUIVALENT) 324 MG: 324 (65 FE) TABLET DELAYED RESPONSE at 09:04

## 2022-01-01 RX ADMIN — METHYLPREDNISOLONE SODIUM SUCCINATE 40 MG: 40 INJECTION, POWDER, FOR SOLUTION INTRAMUSCULAR; INTRAVENOUS at 04:02

## 2022-01-01 RX ADMIN — PREGABALIN 75 MG: 75 CAPSULE ORAL at 08:06

## 2022-01-01 RX ADMIN — FLUOXETINE 40 MG: 20 CAPSULE ORAL at 10:02

## 2022-01-01 RX ADMIN — PREDNISONE 40 MG: 20 TABLET ORAL at 08:16

## 2022-01-01 RX ADMIN — CYANOCOBALAMIN TAB 1000 MCG 1000 MCG: 1000 TAB at 08:07

## 2022-01-01 RX ADMIN — SODIUM BICARBONATE 50 MEQ: 84 INJECTION, SOLUTION INTRAVENOUS at 00:40

## 2022-01-01 RX ADMIN — AMIODARONE HYDROCHLORIDE 150 MG: 50 INJECTION, SOLUTION INTRAVENOUS at 00:38

## 2022-01-01 RX ADMIN — IPRATROPIUM BROMIDE AND ALBUTEROL SULFATE 3 ML: 2.5; .5 SOLUTION RESPIRATORY (INHALATION) at 07:28

## 2022-01-01 RX ADMIN — NYSTATIN 500000 UNITS: 100000 SUSPENSION ORAL at 08:50

## 2022-01-01 RX ADMIN — FLUOXETINE 40 MG: 20 CAPSULE ORAL at 10:55

## 2022-01-01 RX ADMIN — IPRATROPIUM BROMIDE AND ALBUTEROL SULFATE 3 ML: 2.5; .5 SOLUTION RESPIRATORY (INHALATION) at 15:25

## 2022-01-01 RX ADMIN — HYDROCODONE BITARTRATE AND ACETAMINOPHEN 1 TABLET: 7.5; 325 TABLET ORAL at 01:00

## 2022-01-01 RX ADMIN — IPRATROPIUM BROMIDE AND ALBUTEROL SULFATE 3 ML: 2.5; .5 SOLUTION RESPIRATORY (INHALATION) at 11:05

## 2022-01-01 RX ADMIN — HYDROCODONE BITARTRATE AND ACETAMINOPHEN 1 TABLET: 10; 325 TABLET ORAL at 20:39

## 2022-01-01 RX ADMIN — PREDNISONE 40 MG: 20 TABLET ORAL at 14:03

## 2022-01-01 RX ADMIN — GUAIFENESIN 600 MG: 600 TABLET, EXTENDED RELEASE ORAL at 08:09

## 2022-01-01 RX ADMIN — ROSUVASTATIN 20 MG: 10 TABLET, FILM COATED ORAL at 20:34

## 2022-01-01 RX ADMIN — DIGOXIN 500 MCG: 0.25 INJECTION INTRAMUSCULAR; INTRAVENOUS at 17:43

## 2022-01-01 RX ADMIN — NYSTATIN 500000 UNITS: 100000 SUSPENSION ORAL at 20:11

## 2022-01-01 RX ADMIN — BUPROPION HYDROCHLORIDE 100 MG: 100 TABLET, FILM COATED, EXTENDED RELEASE ORAL at 10:54

## 2022-01-01 RX ADMIN — HYDROCODONE BITARTRATE AND ACETAMINOPHEN 1 TABLET: 10; 325 TABLET ORAL at 15:18

## 2022-01-01 RX ADMIN — RANOLAZINE 1000 MG: 500 TABLET, FILM COATED, EXTENDED RELEASE ORAL at 09:04

## 2022-01-01 RX ADMIN — TAMSULOSIN HYDROCHLORIDE 0.4 MG: 0.4 CAPSULE ORAL at 08:09

## 2022-01-01 RX ADMIN — GUAIFENESIN 600 MG: 600 TABLET, EXTENDED RELEASE ORAL at 08:49

## 2022-01-01 RX ADMIN — ASPIRIN 81 MG: 81 TABLET, COATED ORAL at 08:15

## 2022-01-01 RX ADMIN — SODIUM CHLORIDE 75 ML/HR: 9 INJECTION, SOLUTION INTRAVENOUS at 09:03

## 2022-01-01 RX ADMIN — Medication 10 ML: at 10:04

## 2022-01-01 RX ADMIN — FERROUS SULFATE TAB EC 324 MG (65 MG FE EQUIVALENT) 324 MG: 324 (65 FE) TABLET DELAYED RESPONSE at 08:49

## 2022-01-01 RX ADMIN — VANCOMYCIN HYDROCHLORIDE 1000 MG: 1 INJECTION, POWDER, LYOPHILIZED, FOR SOLUTION INTRAVENOUS at 00:58

## 2022-01-01 RX ADMIN — RIVAROXABAN 20 MG: 20 TABLET, FILM COATED ORAL at 17:25

## 2022-01-01 RX ADMIN — CYANOCOBALAMIN TAB 1000 MCG 1000 MCG: 1000 TAB at 10:56

## 2022-01-01 RX ADMIN — IPRATROPIUM BROMIDE AND ALBUTEROL SULFATE 3 ML: 2.5; .5 SOLUTION RESPIRATORY (INHALATION) at 19:51

## 2022-01-01 RX ADMIN — HEPARIN SODIUM 17 UNITS/KG/HR: 10000 INJECTION, SOLUTION INTRAVENOUS at 22:10

## 2022-01-01 RX ADMIN — PANTOPRAZOLE SODIUM 40 MG: 40 TABLET, DELAYED RELEASE ORAL at 20:38

## 2022-01-01 RX ADMIN — GUAIFENESIN 600 MG: 600 TABLET, EXTENDED RELEASE ORAL at 08:15

## 2022-01-01 RX ADMIN — Medication 10 ML: at 20:39

## 2022-01-01 RX ADMIN — PREGABALIN 75 MG: 75 CAPSULE ORAL at 20:32

## 2022-01-01 RX ADMIN — RANOLAZINE 1000 MG: 500 TABLET, FILM COATED, EXTENDED RELEASE ORAL at 20:39

## 2022-01-01 RX ADMIN — PREDNISONE 40 MG: 20 TABLET ORAL at 08:47

## 2022-01-01 RX ADMIN — Medication 10 ML: at 20:11

## 2022-01-01 RX ADMIN — SODIUM CHLORIDE 250 ML: 9 INJECTION, SOLUTION INTRAVENOUS at 02:24

## 2022-01-01 RX ADMIN — AMIODARONE HYDROCHLORIDE 200 MG: 200 TABLET ORAL at 20:39

## 2022-01-01 RX ADMIN — ROSUVASTATIN 20 MG: 10 TABLET, FILM COATED ORAL at 20:39

## 2022-01-01 RX ADMIN — FERROUS SULFATE TAB EC 324 MG (65 MG FE EQUIVALENT) 324 MG: 324 (65 FE) TABLET DELAYED RESPONSE at 08:07

## 2022-01-01 RX ADMIN — HYDROCODONE BITARTRATE AND ACETAMINOPHEN 1 TABLET: 10; 325 TABLET ORAL at 05:22

## 2022-01-01 RX ADMIN — EPINEPHRINE 1 MG: 0.1 INJECTION, SOLUTION ENDOTRACHEAL; INTRACARDIAC; INTRAVENOUS at 00:30

## 2022-01-01 RX ADMIN — IPRATROPIUM BROMIDE AND ALBUTEROL SULFATE 3 ML: 2.5; .5 SOLUTION RESPIRATORY (INHALATION) at 07:05

## 2022-01-01 RX ADMIN — PROPOFOL 50 MG: 10 INJECTION, EMULSION INTRAVENOUS at 07:53

## 2022-01-01 RX ADMIN — SULFAMETHOXAZOLE AND TRIMETHOPRIM 1 TABLET: 800; 160 TABLET ORAL at 20:32

## 2022-01-01 RX ADMIN — Medication 10 ML: at 20:34

## 2022-01-01 RX ADMIN — HYDROCODONE BITARTRATE AND ACETAMINOPHEN 1 TABLET: 10; 325 TABLET ORAL at 11:05

## 2022-01-01 RX ADMIN — METHYLPREDNISOLONE SODIUM SUCCINATE 40 MG: 40 INJECTION, POWDER, FOR SOLUTION INTRAMUSCULAR; INTRAVENOUS at 04:24

## 2022-01-01 RX ADMIN — FLUOXETINE 40 MG: 20 CAPSULE ORAL at 08:47

## 2022-01-01 RX ADMIN — PREGABALIN 75 MG: 75 CAPSULE ORAL at 08:49

## 2022-01-01 RX ADMIN — RANOLAZINE 1000 MG: 500 TABLET, FILM COATED, EXTENDED RELEASE ORAL at 20:02

## 2022-01-01 RX ADMIN — HYDROCODONE BITARTRATE AND ACETAMINOPHEN 1 TABLET: 7.5; 325 TABLET ORAL at 08:42

## 2022-01-01 RX ADMIN — AMIODARONE HYDROCHLORIDE 1 MG/MIN: 1.8 INJECTION, SOLUTION INTRAVENOUS at 05:31

## 2022-01-01 RX ADMIN — PREGABALIN 75 MG: 75 CAPSULE ORAL at 08:10

## 2022-01-01 RX ADMIN — MORPHINE SULFATE 2 MG: 2 INJECTION, SOLUTION INTRAMUSCULAR; INTRAVENOUS at 00:14

## 2022-01-01 RX ADMIN — GUAIFENESIN 600 MG: 600 TABLET, EXTENDED RELEASE ORAL at 09:04

## 2022-01-01 RX ADMIN — ASPIRIN 81 MG: 81 TABLET, COATED ORAL at 10:55

## 2022-01-01 RX ADMIN — Medication 10 ML: at 08:10

## 2022-01-01 RX ADMIN — ONDANSETRON 4 MG: 2 INJECTION INTRAMUSCULAR; INTRAVENOUS at 20:12

## 2022-01-01 RX ADMIN — BUPROPION HYDROCHLORIDE 100 MG: 100 TABLET, FILM COATED, EXTENDED RELEASE ORAL at 09:04

## 2022-01-01 RX ADMIN — PROPOFOL 50 MG: 10 INJECTION, EMULSION INTRAVENOUS at 07:54

## 2022-01-01 RX ADMIN — TAMSULOSIN HYDROCHLORIDE 0.4 MG: 0.4 CAPSULE ORAL at 10:55

## 2022-01-01 RX ADMIN — ASPIRIN 81 MG: 81 TABLET, COATED ORAL at 08:47

## 2022-01-01 RX ADMIN — SULFAMETHOXAZOLE AND TRIMETHOPRIM 1 TABLET: 800; 160 TABLET ORAL at 08:08

## 2022-01-01 RX ADMIN — AMIODARONE HYDROCHLORIDE 200 MG: 200 TABLET ORAL at 20:02

## 2022-01-01 RX ADMIN — HYDROCODONE BITARTRATE AND ACETAMINOPHEN 1 TABLET: 10; 325 TABLET ORAL at 10:10

## 2022-01-01 RX ADMIN — ROSUVASTATIN 20 MG: 10 TABLET, FILM COATED ORAL at 20:32

## 2022-01-01 RX ADMIN — RANOLAZINE 1000 MG: 500 TABLET, FILM COATED, EXTENDED RELEASE ORAL at 20:32

## 2022-01-01 RX ADMIN — PREGABALIN 75 MG: 75 CAPSULE ORAL at 10:54

## 2022-01-01 RX ADMIN — ROSUVASTATIN 20 MG: 10 TABLET, FILM COATED ORAL at 20:03

## 2022-01-01 RX ADMIN — PREGABALIN 75 MG: 75 CAPSULE ORAL at 08:15

## 2022-01-01 RX ADMIN — AMIODARONE HYDROCHLORIDE 200 MG: 200 TABLET ORAL at 08:47

## 2022-01-01 RX ADMIN — HYDROCODONE BITARTRATE AND ACETAMINOPHEN 1 TABLET: 10; 325 TABLET ORAL at 15:38

## 2022-01-01 RX ADMIN — HYDROCODONE BITARTRATE AND ACETAMINOPHEN 1 TABLET: 10; 325 TABLET ORAL at 08:16

## 2022-01-01 RX ADMIN — LORAZEPAM 0.5 MG: 0.5 TABLET ORAL at 21:05

## 2022-01-01 RX ADMIN — AMIODARONE HYDROCHLORIDE 200 MG: 200 TABLET ORAL at 20:11

## 2022-01-01 RX ADMIN — PREGABALIN 75 MG: 75 CAPSULE ORAL at 20:11

## 2022-01-01 RX ADMIN — ASPIRIN 81 MG: 81 TABLET, COATED ORAL at 08:09

## 2022-01-01 RX ADMIN — PANTOPRAZOLE SODIUM 40 MG: 40 TABLET, DELAYED RELEASE ORAL at 08:07

## 2022-01-01 RX ADMIN — PANTOPRAZOLE SODIUM 40 MG: 40 TABLET, DELAYED RELEASE ORAL at 20:34

## 2022-01-01 RX ADMIN — RIVAROXABAN 20 MG: 20 TABLET, FILM COATED ORAL at 15:55

## 2022-01-01 RX ADMIN — GUAIFENESIN 600 MG: 600 TABLET, EXTENDED RELEASE ORAL at 10:02

## 2022-01-01 RX ADMIN — PANTOPRAZOLE SODIUM 40 MG: 40 TABLET, DELAYED RELEASE ORAL at 09:04

## 2022-01-01 RX ADMIN — NYSTATIN 500000 UNITS: 100000 SUSPENSION ORAL at 17:54

## 2022-01-01 RX ADMIN — AMIODARONE HYDROCHLORIDE 200 MG: 200 TABLET ORAL at 20:31

## 2022-01-01 RX ADMIN — Medication 10 ML: at 21:06

## 2022-01-01 RX ADMIN — Medication 10 ML: at 10:43

## 2022-01-01 RX ADMIN — PANTOPRAZOLE SODIUM 40 MG: 40 TABLET, DELAYED RELEASE ORAL at 08:48

## 2022-01-01 RX ADMIN — HYDROCODONE BITARTRATE AND ACETAMINOPHEN 1 TABLET: 10; 325 TABLET ORAL at 00:41

## 2022-01-01 RX ADMIN — HYDROCODONE BITARTRATE AND ACETAMINOPHEN 1 TABLET: 10; 325 TABLET ORAL at 16:14

## 2022-01-01 RX ADMIN — Medication 10 ML: at 08:48

## 2022-01-01 RX ADMIN — RANOLAZINE 1000 MG: 500 TABLET, FILM COATED, EXTENDED RELEASE ORAL at 20:11

## 2022-01-01 RX ADMIN — IPRATROPIUM BROMIDE AND ALBUTEROL SULFATE 3 ML: 2.5; .5 SOLUTION RESPIRATORY (INHALATION) at 18:30

## 2022-01-01 RX ADMIN — Medication 10 ML: at 01:06

## 2022-01-01 RX ADMIN — CALCIUM CHLORIDE 1 G: 100 INJECTION, SOLUTION INTRAVENOUS at 00:32

## 2022-01-01 RX ADMIN — PANTOPRAZOLE SODIUM 40 MG: 40 TABLET, DELAYED RELEASE ORAL at 20:31

## 2022-01-01 RX ADMIN — METHYLPREDNISOLONE SODIUM SUCCINATE 40 MG: 40 INJECTION, POWDER, FOR SOLUTION INTRAMUSCULAR; INTRAVENOUS at 17:28

## 2022-01-01 RX ADMIN — GUAIFENESIN 600 MG: 600 TABLET, EXTENDED RELEASE ORAL at 08:07

## 2022-01-01 RX ADMIN — FLUOXETINE 40 MG: 20 CAPSULE ORAL at 08:09

## 2022-01-01 RX ADMIN — BUPROPION HYDROCHLORIDE 100 MG: 100 TABLET, FILM COATED, EXTENDED RELEASE ORAL at 08:48

## 2022-01-01 RX ADMIN — PANTOPRAZOLE SODIUM 40 MG: 40 TABLET, DELAYED RELEASE ORAL at 20:11

## 2022-01-01 RX ADMIN — METHYLPREDNISOLONE SODIUM SUCCINATE 40 MG: 40 INJECTION, POWDER, FOR SOLUTION INTRAMUSCULAR; INTRAVENOUS at 17:43

## 2022-01-01 RX ADMIN — TAMSULOSIN HYDROCHLORIDE 0.4 MG: 0.4 CAPSULE ORAL at 10:03

## 2022-01-01 RX ADMIN — HYDROCODONE BITARTRATE AND ACETAMINOPHEN 1 TABLET: 10; 325 TABLET ORAL at 19:25

## 2022-01-01 RX ADMIN — DIGOXIN 250 MCG: 0.25 INJECTION INTRAMUSCULAR; INTRAVENOUS at 17:58

## 2022-01-01 RX ADMIN — ASPIRIN 81 MG: 81 TABLET, COATED ORAL at 09:04

## 2022-01-01 RX ADMIN — AMIODARONE HYDROCHLORIDE 200 MG: 200 TABLET ORAL at 11:57

## 2022-01-01 RX ADMIN — RIVAROXABAN 20 MG: 20 TABLET, FILM COATED ORAL at 17:54

## 2022-01-01 RX ADMIN — IPRATROPIUM BROMIDE AND ALBUTEROL SULFATE 3 ML: 2.5; .5 SOLUTION RESPIRATORY (INHALATION) at 06:44

## 2022-01-01 RX ADMIN — HYDROCODONE BITARTRATE AND ACETAMINOPHEN 1 TABLET: 7.5; 325 TABLET ORAL at 18:32

## 2022-01-01 RX ADMIN — RANOLAZINE 1000 MG: 500 TABLET, FILM COATED, EXTENDED RELEASE ORAL at 08:48

## 2022-01-01 RX ADMIN — HYDROCODONE BITARTRATE AND ACETAMINOPHEN 1 TABLET: 10; 325 TABLET ORAL at 22:08

## 2022-01-01 RX ADMIN — RANOLAZINE 1000 MG: 500 TABLET, FILM COATED, EXTENDED RELEASE ORAL at 10:04

## 2022-01-01 RX ADMIN — HYDROCODONE BITARTRATE AND ACETAMINOPHEN 1 TABLET: 10; 325 TABLET ORAL at 07:52

## 2022-01-01 RX ADMIN — SULFAMETHOXAZOLE AND TRIMETHOPRIM 1 TABLET: 800; 160 TABLET ORAL at 10:55

## 2022-01-01 RX ADMIN — CEFEPIME 2 G: 2 INJECTION, POWDER, FOR SOLUTION INTRAVENOUS at 23:30

## 2022-01-01 RX ADMIN — SULFAMETHOXAZOLE AND TRIMETHOPRIM 1 TABLET: 800; 160 TABLET ORAL at 20:39

## 2022-01-01 RX ADMIN — AMIODARONE HYDROCHLORIDE 200 MG: 200 TABLET ORAL at 08:49

## 2022-01-01 RX ADMIN — SULFAMETHOXAZOLE AND TRIMETHOPRIM 1 TABLET: 800; 160 TABLET ORAL at 20:02

## 2022-01-01 RX ADMIN — NYSTATIN 500000 UNITS: 100000 SUSPENSION ORAL at 10:38

## 2022-07-09 ENCOUNTER — TELEPHONE ENCOUNTER (OUTPATIENT)
Dept: URBAN - METROPOLITAN AREA CLINIC 121 | Facility: CLINIC | Age: 67
End: 2022-07-09

## 2022-07-09 RX ORDER — CETIRIZINE HYDROCHLORIDE 10 MG/1
TABLET, FILM COATED ORAL ONCE A DAY
Refills: 0 | OUTPATIENT
Start: 2017-11-10 | End: 2017-12-22

## 2022-07-09 RX ORDER — LORAZEPAM 0.5 MG/1
TABLET ORAL AS NEEDED
Refills: 0 | OUTPATIENT
Start: 2017-11-10 | End: 2017-12-22

## 2022-07-09 RX ORDER — BUPROPION HYDROCHLORIDE 100 MG/1
TABLET, FILM COATED ORAL ONCE A DAY
Refills: 0 | OUTPATIENT
Start: 2017-11-10 | End: 2017-12-22

## 2022-07-09 RX ORDER — RANOLAZINE 500 MG/1
TABLET, FILM COATED, EXTENDED RELEASE ORAL TWICE A DAY
Refills: 0 | OUTPATIENT
Start: 2017-11-10 | End: 2017-12-22

## 2022-07-09 RX ORDER — FLUOXETINE HYDROCHLORIDE 40 MG/1
CAPSULE ORAL ONCE A DAY
Refills: 0 | OUTPATIENT
Start: 2017-11-10 | End: 2017-12-22

## 2022-07-09 RX ORDER — AMLODIPINE BESYLATE 5 MG/1
TABLET ORAL ONCE A DAY
Refills: 0 | OUTPATIENT
Start: 2017-11-10 | End: 2017-12-22

## 2022-07-09 RX ORDER — ASPIRIN 325 MG/1
TABLET ORAL ONCE A DAY
Refills: 0 | OUTPATIENT
Start: 2017-11-10 | End: 2017-12-22

## 2022-07-09 RX ORDER — CETIRIZINE HYDROCHLORIDE 10 MG/1
TABLET, FILM COATED ORAL ONCE A DAY
Refills: 0 | OUTPATIENT
Start: 2017-12-22 | End: 2017-12-22

## 2022-07-09 RX ORDER — CLOPIDOGREL BISULFATE 75 MG
TABLET ORAL ONCE A DAY
Refills: 0 | OUTPATIENT
Start: 2017-11-10 | End: 2017-12-22

## 2022-07-09 RX ORDER — ROSUVASTATIN CALCIUM 10 MG
TABLET ORAL ONCE A DAY
Refills: 0 | OUTPATIENT
Start: 2017-11-10 | End: 2017-12-22

## 2022-07-09 RX ORDER — PANTOPRAZOLE SODIUM 20 MG
TABLET, DELAYED RELEASE (ENTERIC COATED) ORAL ONCE A DAY
Refills: 0 | OUTPATIENT
Start: 2017-11-10 | End: 2017-12-22

## 2022-07-09 RX ORDER — EZETIMIBE 10 MG/1
TABLET ORAL ONCE A DAY
Refills: 0 | OUTPATIENT
Start: 2017-11-10 | End: 2017-12-22

## 2022-07-09 RX ORDER — METOPROLOL SUCCINATE 25 MG/1
ONE HALF OF A PILL TWICE DAILY TABLET, EXTENDED RELEASE ORAL
Refills: 0 | OUTPATIENT
Start: 2017-11-10 | End: 2017-12-22

## 2022-07-10 ENCOUNTER — TELEPHONE ENCOUNTER (OUTPATIENT)
Dept: URBAN - METROPOLITAN AREA CLINIC 121 | Facility: CLINIC | Age: 67
End: 2022-07-10

## 2022-07-10 RX ORDER — FLUOXETINE HYDROCHLORIDE 40 MG/1
CAPSULE ORAL ONCE A DAY
Refills: 0 | Status: ACTIVE | COMMUNITY
Start: 2017-12-22

## 2022-07-10 RX ORDER — LORAZEPAM 0.5 MG/1
TABLET ORAL AS NEEDED
Refills: 0 | Status: ACTIVE | COMMUNITY
Start: 2017-12-22

## 2022-07-10 RX ORDER — ASPIRIN 325 MG/1
TABLET ORAL ONCE A DAY
Refills: 0 | Status: ACTIVE | COMMUNITY
Start: 2017-12-22

## 2022-07-10 RX ORDER — METOPROLOL SUCCINATE 25 MG/1
ONE HALF OF A PILL TWICE DAILY TABLET, EXTENDED RELEASE ORAL
Refills: 0 | Status: ACTIVE | COMMUNITY
Start: 2017-12-22

## 2022-07-10 RX ORDER — RANOLAZINE 500 MG/1
TABLET, FILM COATED, EXTENDED RELEASE ORAL TWICE A DAY
Refills: 0 | Status: ACTIVE | COMMUNITY
Start: 2017-12-22

## 2022-07-10 RX ORDER — CLOPIDOGREL BISULFATE 75 MG
TABLET ORAL ONCE A DAY
Refills: 0 | Status: ACTIVE | COMMUNITY
Start: 2017-12-22

## 2022-07-10 RX ORDER — BUPROPION HYDROCHLORIDE 100 MG/1
TABLET, FILM COATED ORAL ONCE A DAY
Refills: 0 | Status: ACTIVE | COMMUNITY
Start: 2017-12-22

## 2022-07-10 RX ORDER — EZETIMIBE 10 MG/1
TABLET ORAL ONCE A DAY
Refills: 0 | Status: ACTIVE | COMMUNITY
Start: 2017-12-22

## 2022-07-10 RX ORDER — CETIRIZINE HYDROCHLORIDE 10 MG/1
TABLET, FILM COATED ORAL AS NEEDED
Refills: 0 | Status: ACTIVE | COMMUNITY
Start: 2017-12-22

## 2022-07-10 RX ORDER — ROSUVASTATIN CALCIUM 10 MG
TABLET ORAL ONCE A DAY
Refills: 0 | Status: ACTIVE | COMMUNITY
Start: 2017-12-22

## 2022-07-10 RX ORDER — PANTOPRAZOLE SODIUM 20 MG
TABLET, DELAYED RELEASE (ENTERIC COATED) ORAL ONCE A DAY
Refills: 0 | Status: ACTIVE | COMMUNITY
Start: 2017-12-22

## 2022-07-10 RX ORDER — AMLODIPINE BESYLATE 5 MG/1
TABLET ORAL ONCE A DAY
Refills: 0 | Status: ACTIVE | COMMUNITY
Start: 2017-12-22

## 2022-07-30 ENCOUNTER — TELEPHONE ENCOUNTER (OUTPATIENT)
Age: 67
End: 2022-07-30

## 2022-07-31 ENCOUNTER — TELEPHONE ENCOUNTER (OUTPATIENT)
Age: 67
End: 2022-07-31

## 2022-12-10 PROBLEM — I48.91 ATRIAL FIBRILLATION WITH RAPID VENTRICULAR RESPONSE: Status: ACTIVE | Noted: 2022-01-01
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
## 2022-12-11 NOTE — H&P
[facility] Medicine Services      Patient Name: Fabian Manjarrez  : 1955  MRN: [id number]  Primary Care Physician:  Provider, No Known  Date of admission: 12/10/2022      Subjective      Chief Complaint: Shortness of breath    History of Present Illness: Fabian Manjarrez is a 67 y.o. male who presented to [facility] on 12/10/2022 complaining of above.   Notified of transfer arrival to PCU unit by nursing staff.  Patient was accepted for transfer from [facility][1] ED earlier today by Dr. Avelar for A. fib with RVR and acute on chronic hypoxic respiratory failure with possible pulmonary edema.  History is somewhat challenging though he is alert oriented x3 and able to provide history he sometimes gets lost in the information that he provides and what timing of events.  He has extensive past medical history-see assessment/plan for details.  Per available outside records the following was found.    He was admitted to [facility][1] between 2022 through 2022 and managed for acute COPD exacerbation, possible atypical pneumonia, and WOLF related to obstructive uropathy.  He was not on supplemental oxygen prior to this time though was discharged with 2 L nasal cannula and a course of empiric antibiotics.  VQ scan during that hospitalization was low probability of PE.  He had mild WOLF with a creatinine of 2. This improved to discharge creatinine of 1.4 with medical management and after sanford cathether placement for obstructive uropathy.  He was able to void after the Sanford catheter was removed and he was discharged with Flomax with recommendations to follow-up with urology due to questionable filling defect related to the prostate versus bladder mass measuring 1.5 cm in size.  Outpatient follow-up with urology was recommended.    He went home and over the course of 24 hours still had persistent shortness of breath which prompted his presentation again to the  emergency room.  At this time he was found to be in A. fib with RVR and was initiated on amiodarone and given IV Lasix 40 mg x 1. Chest x-ray revealed stable diffuse interstitial changes bilaterally with stable patchy multifocal airspace infiltrates. His white blood cell count was 20 hemoglobin was 9.7 platelets were 352.  Sodium was 135 creatinine was 1.2.  Albumin was 3.  BNP was 1095. Troponin I was 0.07.  Influenza panel COVID-19 and RSV were negative.  Transfer to tertiary facility was requested.    On arrival to our facility he is on 6 L nasal cannula.  He denies any active chest pain though reports 8 out of 10 headache that is more right-sided with some sensitivity to light.  He describes a history of migraine headaches every few weeks and this is similar.  No focal deficits are noted.  Mild nausea as described.  She denies sinus tenderness.  No rhinorrhea.  Endorses generalized myalgias.  Denies hemoptysis.  Denies travel history.  No known sick contacts.  Cardiology initiated heparin drip on arrival.      ROS: 10 point ROS negative aside from noted per HPI: No dysuria. No hemoptysis      Personal History     Past Medical History:   Diagnosis Date   • Arthritis    • COPD (chronic obstructive pulmonary disease) (HCC)    • Coronary artery disease    • Elevated cholesterol    • GERD (gastroesophageal reflux disease)    • History of transfusion    • Hypertension        Past Surgical History:   Procedure Laterality Date   • ABDOMINAL SURGERY     • CARDIAC CATHETERIZATION     • CARDIAC SURGERY     • COLON SURGERY     • HERNIA REPAIR       CABG  Hernia repair  Cholecystectomy  Permanent pacemaker  History of left-sided lumpectomy      Family History: family history is not on file.  Multiple family members with various cancers reported.  He describes a mother with breast cancer/myelodysplastic disorder.  Sister with melanoma.  Sister with colon cancer.  History and is somewhat variable as noted per HPI.    Social  History:      Home Medications:  Prior to Admission Medications     Prescriptions Last Dose Informant Patient Reported? Taking?    albuterol sulfate  (90 Base) MCG/ACT inhaler 12/9/2022  Yes Yes    Inhale 1 puff Every 4 (Four) Hours As Needed for Wheezing.    aspirin 81 MG EC tablet 12/9/2022  Yes Yes    Take 81 mg by mouth Daily.    buPROPion SR (WELLBUTRIN SR) 100 MG 12 hr tablet 12/9/2022  Yes Yes    Take 100 mg by mouth Daily.    docusate sodium (COLACE) 100 MG capsule 12/9/2022  Yes Yes    Take 100 mg by mouth Daily As Needed for Constipation.    ezetimibe (ZETIA) 10 MG tablet 12/9/2022  Yes Yes    Take 10 mg by mouth Daily.    ferrous sulfate 325 (65 FE) MG tablet 12/9/2022  Yes Yes    Take 40 mg by mouth Daily With Breakfast.    FLUoxetine (PROzac) 20 MG capsule 12/9/2022  Yes Yes    Take 40 mg by mouth Daily.    guaiFENesin (MUCINEX) 600 MG 12 hr tablet 12/9/2022  Yes Yes    Take 600 mg by mouth Daily.    HYDROcodone-acetaminophen (NORCO)  MG per tablet 12/9/2022  Yes Yes    Take 1 tablet by mouth Every 4 (Four) Hours As Needed for Moderate Pain.    metoprolol tartrate (LOPRESSOR) 25 MG tablet 12/9/2022  Yes Yes    Take 25 mg by mouth 2 (Two) Times a Day.    pantoprazole (PROTONIX) 40 MG EC tablet 12/9/2022  Yes Yes    Take 40 mg by mouth 2 (Two) Times a Day.    predniSONE (DELTASONE) 5 MG tablet 12/9/2022  Yes Yes    Take 5 mg by mouth Daily.    pregabalin (LYRICA) 75 MG capsule 12/9/2022  Yes Yes    Take 75 mg by mouth 2 (Two) Times a Day.    ranolazine (RANEXA) 500 MG 12 hr tablet 12/9/2022  Yes Yes    Take 1,000 mg by mouth 2 (Two) Times a Day.    rosuvastatin (CRESTOR) 20 MG tablet 12/9/2022  Yes Yes    Take 20 mg by mouth Every Night.    tamsulosin (FLOMAX) 0.4 MG capsule 24 hr capsule 12/9/2022  Yes Yes    Take 1 capsule by mouth Daily.    vitamin B-12 (CYANOCOBALAMIN) 1000 MCG tablet 12/9/2022  Yes Yes    Take 1,000 mcg by mouth Daily.            Allergies:  Allergies   Allergen  Reactions   • Nitroglycerin Headache   • Nsaids GI Bleeding       Objective      Vitals:   Temp:  [98.9 °F (37.2 °C)] 98.9 °F (37.2 °C)  Heart Rate:  [76] 76  Resp:  [16] 16  BP: (121)/(70) 121/70    Physical Exam   Physical Exam:     GEN/CONS:   Vitals noted above, NAD, AOX3, white male 6 L nasal cannula   EYES:  Conjunctiva pink   ENMT:  NECK:  Atraumatic external nose/ears, Oropharynx clear  Symmetric, trachea midline   CV:   Mildly tachycardic heart rate 101 on monitor, Reg S1/S2, no murmur   PULM:  Mild expiratory wheezing bilaterally with scattered rhonchi   GI:  : Soft, Nontender, Nondistended   Deferred.    MSK:  No cyanosis, No LE edema   NEURO:    Sensation and motor symmetric and grossly intact and nonfocal--moving 4 extremities.   PSYCH:   Appropriate mood and affect         Result Review    Result Review:  I have personally reviewed the results from the time of this admission to 12/10/2022 23:43 EST and agree with these findings:  [x]  Laboratory  []  Microbiology  [x]  Radiology  [x]  EKG/Telemetry   [x]  Cardiology/Vascular   []  Pathology  [x]  Old records  []  Other:  Most notable findings include:    LABS                              Brief Urine Lab Results     None        ________________________________  MICRO  Microbiology Results (last 10 days)     ** No results found for the last 240 hours. **        ________________________________  RAD     ________________________________  CARDS     No results found for this or any previous visit.        Assessment & Plan        Active Hospital Problems:  Active Hospital Problems    Diagnosis    • **Atrial fibrillation with rapid ventricular response (HCC)        Assessment:    Atrial fibrillation with RVR/ VT  • Admission: OSH (outside hospital)  labs as noted per HPI: Trop I wnl, BNP 1098  • Imaging: CXR at OSH revealing stable diffuse interstitial changes bilaterally with stable patchy multifocal airspace disease per report.  • ECG: Local 12/10/2022  "atrial fibrillation rate controlled RBBB.  Personally ordered and reviewed.  • ECHO: Last available on our record 2016: EF 55 to 60%.  Of note patient was hospitalized 8/2022 (see SSS) where her EF was described to be normal.  • Context/Misc: Transferred from [facility] ED. Has permanent pacemaker for sick sinus syndrome  History of sick sinus syndrome with permanent pacemaker   · Noted per Dr. Reyna AGARWAL Summary from [facility] 8/30/2022: (CareEverywhere)--please see note for full details.  Briefly was admitted for chest pain and underwent left heart catheterization that did not require intervention.  EF was noted to be \"stable\" and later in the note indicated previously was \"normal\".   Coronary artery disease status post CABG/History of right bundle branch block  · See above for details (#1/2)  Acute hypoxic respiratory failure  • Admission: Arrived from [facility] on 6 L nasal cannula.  Per outside hospital labs: Influenza panel/RSV/COVID-19 negative, troponin I within normal limits, WBC 20 though most recently hospitalized for COPD and was on steroids.  • Not chronically on supplemental oxygen per se though recently hospitalized at [facility] 12/7/2022 - 12/9/2022 for COPD exacerbation and discharged with 2 L nasal cannula.  History of interstitial lung disease  · Noted per outpatient records per Dr. Reyna AGARWAL summary 8/2022 (outside hospital): \"prior imaging showed evidence of possible interstitial lung disease. High-resolution CT of the chest did confirm this. Pulmonary was consulted who recommended a prolonged taper of prednisone and then follow-up as an outpatient for pulmonary function testing\"  · CT chest 8/29/2022: Chronic interstitial lung disease as described with a UIP type pattern. Given the patient's history of rheumatoid arthritis findings could be related to rheumatoid related interstitial lung disease. Other differentials could include " granulomatous process like sarcoid disease given the presence of multiple calcified granulomas  · He indicates he is not followed by pulmonology at this time.  · ?related to RA  COPD  · Noted per chart review history.  Not chronically on medications  · See Acute Hypoxic Respiratory Failure above.  Rheumatoid arthritis  · Follows with Dr. Lauren rheumatology-clinic note 5/2022 reviewed.  Was treated with leflunomide previously  Immunosuppression  · Endorses chronically on prednisone 5 mg daily  Urinary retention  · Will require outpatient follow-up with urology regarding renal ultrasound findings from [facility] as noted per discharge summary 12/9/2022.  This is in regards to 1.5 cm prostate shadow/luminal defect versus bladder mass.   · Currently has pop catheter in place on arrival  Anemia-Normocytic/Suspect on Chronic Disease  · Admission: OSH Hgb 9.7 12/10/22  History of TIA  Hypertension  Hyperlipidemia  GERD  Chronic back pain/degenerative joint disease  Depression with anxiety  Full code  · Discussed and verified    Discussion/Plan:     Notified of transfer arrival to PCU from [facility] ED for A. fib with RVR and acute on chronic hypoxic respiratory failure.  Patient was accepted by Dr. Avelar 12/10/2022.  Cardiology consultation--notified of arrival.  Appreciate assistance  Continue amiodarone gtt. Heparin gtt initiated by Cardiology.    Continue prior to admission beta-blocker.  Continue aspirin/Ranexa/statin  Request permanent pacemaker interrogation  Telemetry monitoring.  Trend troponin.  Ordered ECG-noted above.  Obtain echocardiogram    Currently on 6 L nasal cannula.  Wean as tolerated  Clinically does not appear to be grossly volume overloaded  IV steroids with DANIEL/IAM prn.   Of note when weaning off IV/tapering steroids, pt is on chronic steroids (Prednsione 5 daily).  Pulmonary consultation with consideration for possible ILD contribution.     Await locally ordered labs  and chest x-ray given increased O2 requirements from OSH ED.    Continue prior to admission antidepressants  Continue Flomax.  Sanford in place on arrival.  Consider voiding trial next 24-48 with outpatient follow-up with urology    Continue PPI  Surveillance labs.  See orders for additional details.  Pharmacy to confirm home medications and will resume/adjust appropriate medications if necessary once confirmed and notified as ordered.  Further recommendations to follow as inpatient course and evaluation proceeds.   Daytime hospitalist provider will continue forward care in the morning.       DVT prophylaxis:  Mechanical DVT prophylaxis orders are present.    CODE STATUS:       Admission Status:  I believe this patient meets inpatient status.    I discussed the patient's findings and my recommendations with patient.    This patient has been examined wearing appropriate Personal Protective Equipment     Signature: Elmer Green MD

## 2022-12-11 NOTE — PLAN OF CARE
Goal Outcome Evaluation:         Pt is on a heparin gtt and amio gtt, possible cardioversion tomorrow. Will continue to monitor

## 2022-12-12 PROBLEM — I25.810 CORONARY ARTERY DISEASE INVOLVING AUTOLOGOUS VEIN CORONARY BYPASS GRAFT WITHOUT ANGINA PECTORIS: Status: ACTIVE | Noted: 2022-01-01

## 2022-12-12 PROBLEM — K21.9 GERD WITHOUT ESOPHAGITIS: Status: ACTIVE | Noted: 2022-01-01

## 2022-12-12 PROBLEM — J44.9 COPD (CHRONIC OBSTRUCTIVE PULMONARY DISEASE): Status: ACTIVE | Noted: 2022-01-01

## 2022-12-12 PROBLEM — J96.22 ACUTE ON CHRONIC RESPIRATORY FAILURE WITH HYPERCAPNIA: Status: ACTIVE | Noted: 2022-01-01

## 2022-12-12 PROBLEM — E78.2 MIXED HYPERLIPIDEMIA: Status: ACTIVE | Noted: 2022-01-01

## 2022-12-12 PROBLEM — M15.9 PRIMARY OSTEOARTHRITIS INVOLVING MULTIPLE JOINTS: Status: ACTIVE | Noted: 2022-01-01

## 2022-12-12 PROBLEM — I10 PRIMARY HYPERTENSION: Status: ACTIVE | Noted: 2022-01-01

## 2022-12-12 NOTE — CONSULTS
Urology Consult Note    Patient:Fabian Manjarrez :1955  Room:[facility] [place][note 3]  Admit Date12/10/2022  Age:67 y.o.     SEX:male     DOS:2022     MR:[id number]     Visit:[id number]       Attending: Delvin Da Silva MD  Referring Provider: Avinash  Reason for Consultation: Retention    Patient Care Team:  Provider, No Known as PCP - General    Chief complaint I just can't pee right    Subjective .     History of present illness:  *  Pt has been in/out of [facility] with cardiopulmonary issues.  Had creatinine of 2 that improved with pop  States he has had weak stream for 5-6 years, has frequency of severity of every 2 hours likely due to incomplete emptying; no gross hematuria; thinks caffeine makes worse  Never seen urology    Review of Systems  10 point review of systems were reviewed and are negative except for:  In HPI    History  Past Medical History:   Diagnosis Date   • Arthritis    • COPD (chronic obstructive pulmonary disease) (HCC)    • Coronary artery disease    • Elevated cholesterol    • GERD (gastroesophageal reflux disease)    • History of transfusion    • Hypertension      Past Surgical History:   Procedure Laterality Date   • ABDOMINAL SURGERY     • CARDIAC CATHETERIZATION     • CARDIAC SURGERY     • COLON SURGERY     • HERNIA REPAIR       Social History     Socioeconomic History   • Marital status:    Tobacco Use   • Smoking status: Never     Passive exposure: Never   • Smokeless tobacco: Never   Vaping Use   • Vaping Use: Never used   Substance and Sexual Activity   • Drug use: Never   • Sexual activity: Defer     History reviewed. No pertinent family history.  Allergies   Allergen Reactions   • Nitroglycerin Headache   • Nsaids GI Bleeding     Prior to Admission medications    Medication Sig Start Date End Date Taking? Authorizing Provider   albuterol sulfate  (90 Base) MCG/ACT inhaler Inhale 1 puff Every 4 (Four) Hours As Needed for Wheezing.   Yes Provider, MD Kana   aspirin  81 MG EC tablet Take 81 mg by mouth Daily.   Yes Kana Mendoza MD   buPROPion SR (WELLBUTRIN SR) 100 MG 12 hr tablet Take 100 mg by mouth Daily.   Yes Kana Mendoza MD   docusate sodium (COLACE) 100 MG capsule Take 100 mg by mouth Daily As Needed for Constipation.   Yes Kana Mendoza MD   ezetimibe (ZETIA) 10 MG tablet Take 10 mg by mouth Daily.   Yes Kana Mendoza MD   ferrous sulfate 325 (65 FE) MG tablet Take 40 mg by mouth Daily With Breakfast.   Yes Kana Mendoza MD   FLUoxetine (PROzac) 20 MG capsule Take 40 mg by mouth Daily.   Yes Kana Mendoza MD   guaiFENesin (MUCINEX) 600 MG 12 hr tablet Take 600 mg by mouth Daily.   Yes Kana Mendoza MD   HYDROcodone-acetaminophen (NORCO)  MG per tablet Take 1 tablet by mouth Every 4 (Four) Hours As Needed for Moderate Pain.   Yes Kana Mendoza MD   metoprolol tartrate (LOPRESSOR) 25 MG tablet Take 25 mg by mouth 2 (Two) Times a Day.   Yes Kana Mendoza MD   pantoprazole (PROTONIX) 40 MG EC tablet Take 40 mg by mouth 2 (Two) Times a Day.   Yes Provider, Historical, MD   predniSONE (DELTASONE) 5 MG tablet Take 5 mg by mouth Daily.   Yes Provider, Historical, MD   pregabalin (LYRICA) 75 MG capsule Take 75 mg by mouth 2 (Two) Times a Day.   Yes Provider, Historical, MD   ranolazine (RANEXA) 500 MG 12 hr tablet Take 1,000 mg by mouth 2 (Two) Times a Day.   Yes Kana Mendoza MD   rosuvastatin (CRESTOR) 20 MG tablet Take 20 mg by mouth Every Night.   Yes Kana Mendoza MD   tamsulosin (FLOMAX) 0.4 MG capsule 24 hr capsule Take 1 capsule by mouth Daily.   Yes Provider, Historical, MD   vitamin B-12 (CYANOCOBALAMIN) 1000 MCG tablet Take 1,000 mcg by mouth Daily.   Yes Kana Mendoza MD         Objective     tMax 24 hours:  Temp (24hrs), Av.1 °F (36.7 °C), Min:97.7 °F (36.5 °C), Max:98.4 °F (36.9 °C)    Vital Sign Ranges:  Temp:  [97.7 °F (36.5 °C)-98.4 °F (36.9 °C)] 98 °F  (36.7 °C)  Heart Rate:  [] 119  Resp:  [12-18] 16  BP: ()/(55-78) 116/78  Intake and Output Last 3 Shifts:  I/O last 3 completed shifts:  In: 720 [P.O.:720]  Out: -       Physical Exam:     General Appearance:    Alert, cooperative, in no acute distress   Head:    Normocephalic, without obvious abnormality, atraumatic   Eyes:            Lids and lashes normal, conjunctivae and sclerae normal, no   icterus, no pallor, corneas clear, PERRLA   Ears:    Ears appear intact with no abnormalities noted   Throat:   No oral lesions, no thrush, oral mucosa moist   Neck:   No adenopathy, supple, trachea midline, no thyromegaly, no   carotid bruit, no JVD   Back:     No kyphosis present, no scoliosis present, no skin lesions,      erythema or scars, no tenderness to percussion or                   palpation,   range of motion normal   Lungs:     Clear to auscultation,respirations regular, even and                  unlabored    Heart:    Regular rhythm and normal rate, normal S1 and S2, no            murmur, no gallop, no rub, no click   Chest Wall:    No abnormalities observed   Abdomen:     Normal bowel sounds, no masses, no organomegaly, soft        non-tender, non-distended, no guarding, no rebound                tenderness   Rectal:     Deferred   Extremities:   Moves all extremities well, no edema, no cyanosis, no             redness   Pulses:   Pulses palpable and equal bilaterally   Skin:   No bleeding, bruising or rash   Lymph nodes:   No palpable adenopathy   Neurologic:   Cranial nerves 2 - 12 grossly intact, sensation intact, DTR       present and equal bilaterally  Urologic - no penile edema, urine clear         Results Review:     Lab Results (last 24 hours)     Procedure Component Value Units Date/Time    Pathology Consultation [[id number]] Collected: 12/11/22 0042    Specimen: Blood, Venous Line Updated: 12/12/22 1033     Final Diagnosis --     Leukocytosis with left shifted granulocytes  Anemia  No  blasts identified       Case Report --     Surgical Pathology Report                         Case: [id number]                                  Authorizing Provider:  lEmer Green MD         Collected:           12/11/2022 12:42 AM          Ordering Location:     [facility]       Received:            12/12/2022 08:46 AM                                 PROGRESS CARE                                                                Pathologist:           Jenaro Hastings MD                                                            Specimen:    Blood, Venous Line                                                                         Manual Differential [[id number]]  (Abnormal) Collected: 12/12/22 0457    Specimen: Blood Updated: 12/12/22 0626     Neutrophil % 87.0 %      Lymphocyte % 8.0 %      Monocyte % 3.0 %      Bands %  1.0 %      Atypical Lymphocyte % 1.0 %      Neutrophils Absolute 12.58 10*3/mm3      Lymphocytes Absolute 1.29 10*3/mm3      Monocytes Absolute 0.43 10*3/mm3      Sue Cells Slight/1+     Poikilocytes Slight/1+     WBC Morphology Normal     Large Platelets Slight/1+    CBC & Differential [[id number]]  (Abnormal) Collected: 12/12/22 0457    Specimen: Blood Updated: 12/12/22 0626    Narrative:      The following orders were created for panel order CBC & Differential.  Procedure                               Abnormality         Status                     ---------                               -----------         ------                     CBC Auto Differential[[id number]]        Abnormal            Final result               Scan Slide[[id number]]                                       Final result                 Please view results for these tests on the individual orders.    CBC Auto Differential [[id number]]  (Abnormal) Collected: 12/12/22 0457    Specimen: Blood Updated: 12/12/22 0626     WBC 14.30 10*3/mm3      RBC 3.07 10*6/mm3      Hemoglobin 9.7 g/dL      Hematocrit 28.8 %      MCV 93.7  fL      MCH 31.6 pg      MCHC 33.7 g/dL      RDW 15.8 %      RDW-SD 55.1 fl      MPV 7.8 fL      Platelets 266 10*3/mm3     Narrative:      Modified report. Previous result was Hemogram on 12/12/2022 at 0530 EST.  The previously reported component NRBC is no longer being reported. Previous result was 0.3 /100 WBC (Reference Range: 0.0-0.2 /100 WBC) on 12/12/2022 at 0530 EST.    Scan Slide [[id number]] Collected: 12/12/22 0457    Specimen: Blood Updated: 12/12/22 0626     Scan Slide --     Comment: See Manual Differential Results       Troponin [[id number]]  (Normal) Collected: 12/12/22 0457    Specimen: Blood Updated: 12/12/22 0535     Troponin T 0.027 ng/mL     Narrative:      Troponin T Reference Range:  <= 0.03 ng/mL-   Negative for AMI  >0.03 ng/mL-     Abnormal for myocardial necrosis.  Clinicians would have to utilize clinical acumen, EKG, Troponin and serial changes to determine if it is an Acute Myocardial Infarction or myocardial injury due to an underlying chronic condition.       Results may be falsely decreased if patient taking Biotin.      Basic Metabolic Panel [[id number]]  (Abnormal) Collected: 12/12/22 0457    Specimen: Blood Updated: 12/12/22 0535     Glucose 159 mg/dL      BUN 18 mg/dL      Creatinine 0.90 mg/dL      Sodium 134 mmol/L      Potassium 4.4 mmol/L      Chloride 98 mmol/L      CO2 24.0 mmol/L      Calcium 8.4 mg/dL      BUN/Creatinine Ratio 20.0     Anion Gap 12.0 mmol/L      eGFR 93.0 mL/min/1.73      Comment: National Kidney Foundation and American Society of Nephrology (ASN) Task Force recommended calculation based on the Chronic Kidney Disease Epidemiology Collaboration (CKD-EPI) equation refit without adjustment for race.       Narrative:      GFR Normal >60  Chronic Kidney Disease <60  Kidney Failure <15      aPTT [[id number]]  (Abnormal) Collected: 12/12/22 0457    Specimen: Blood Updated: 12/12/22 0528     PTT 44.1 seconds     aPTT [[id number]]  (Abnormal) Collected: 12/11/22 2038     Specimen: Blood Updated: 12/11/22 2115     PTT 47.6 seconds     aPTT [[id number]]  (Abnormal) Collected: 12/11/22 1456    Specimen: Blood Updated: 12/11/22 1516     PTT 39.3 seconds          No results found for: URINECX     Imaging Results (Last 7 Days)     Procedure Component Value Units Date/Time    US Outside Films [[id number]] Resulted: 12/12/22 0933     Updated: 12/12/22 0933    Narrative:      This procedure was auto-finalized with no dictation required.    CT Outside Films [[id number]] Resulted: 12/12/22 0932     Updated: 12/12/22 0932    Narrative:      This procedure was auto-finalized with no dictation required.    NM Outside Films [[id number]] Resulted: 12/12/22 0932     Updated: 12/12/22 0932    Narrative:      This procedure was auto-finalized with no dictation required.    XR Outside Films [[id number]] Resulted: 12/12/22 0932     Updated: 12/12/22 0932    Narrative:      This procedure was auto-finalized with no dictation required.    XR Outside Films [[id number]] Resulted: 12/12/22 0931     Updated: 12/12/22 0931    Narrative:      This procedure was auto-finalized with no dictation required.    CT Head Without Contrast [[id number]] Collected: 12/11/22 0931     Updated: 12/11/22 0936    Narrative:      CT HEAD WO CONTRAST-     Date of Exam: 12/11/2022 9:00 AM     Indication: Headache, Anticoagulation.     Comparison: None available.     Technique:  Without contrast, contiguous axial CT images of the head  were obtained from skull base to vertex.  Automated exposure control and  iterative reconstruction methods were used.     FINDINGS  No intra or extra-axial fluid collections, masses, or areas of  hemorrhage are identified. No midline shift or mass effect is  identified. Ventricles and sulci are prominent, consistent with  age-appropriate atrophy. There is mild maxillary sinus mucosal  thickening as well as thickening of the ethmoidal air cell mucosa.  Orbits are unremarkable. Mastoid air cells are  unremarkable.       Impression:         1. No acute intracranial pathology is identified.     Electronically Signed By-Francis Wynn MD On:12/11/2022 9:34 AM  This report was finalized on [id number] by  Francis Wynn MD.    XR Chest 1 View [[id number]] Collected: 12/10/22 2331     Updated: 12/11/22 0136    Narrative:      EXAMINATION: XR CHEST 1 VW    DATE: 12/11/2022 12:29 AM    INDICATION:  Cough, chills, chest pain, and dyspnea; Acute hypoxic respiratory failure, interstitial lung disease    COMPARISON:  December 10, 2022    FINDINGS:      Diffuse linear interstitial opacities are again demonstrated. There are few scattered nodular opacities as well, many of which are calcified and represent granulomas.    Heart size within normal limits.  Status post median sternotomy. Cardiac device with leads in the right internal right ventricle.    No acute osseous abnormality.          Impression:        1.  Unchanged since radiograph yesterday. Diffuse interstitial opacities and superimposed small nodular opacities again demonstrated. The interstitial opacities are increased from December 7, 2022 exam. The change may represent acute component of   interstitial lung disease, superimposed edema, or atypical pneumonia.          Electronically signed by:  Roberto Booth M.D.    12/10/2022 11:35 PM Mountain Time          Inpatient Meds:   Scheduled Meds:amiodarone, 200 mg, Oral, Q12H  aspirin, 81 mg, Oral, Daily  buPROPion SR, 100 mg, Oral, Daily  ferrous sulfate, 324 mg, Oral, Daily With Breakfast  FLUoxetine, 40 mg, Oral, Daily  guaiFENesin, 600 mg, Oral, Daily  ipratropium-albuterol, 3 mL, Nebulization, 4x Daily - RT  methylPREDNISolone sodium succinate, 40 mg, Intravenous, Q12H  pantoprazole, 40 mg, Oral, BID  pregabalin, 75 mg, Oral, BID  ranolazine, 1,000 mg, Oral, BID  rosuvastatin, 20 mg, Oral, Nightly  sodium chloride, 10 mL, Intravenous, Q12H  tamsulosin, 0.4 mg, Oral, Daily       Continuous Infusions:heparin, 12  Units/kg/hr, Last Rate: 18 Units/kg/hr (12/12/22 0613)       PRN Meds:.•  acetaminophen  •  heparin  •  heparin  •  HYDROcodone-acetaminophen  •  Morphine  •  ondansetron  •  sodium chloride  •  sodium chloride      Assessment & Plan       Atrial fibrillation with rapid ventricular response (HCC)      Impression:  1. BPH w/ LUTS  2. Retention    Plan:  1. Recommend continuing flomax  2. Needs cystoscopy --- I suspect the area seen on CT was a median lobe but needs confirmation.  Will likely need TURP  3. Get CT report from [place]     I discussed the patients findings and my recommendations with pt/wife    Jenaro Matias MD  12/12/22  11:35 EST

## 2022-12-12 NOTE — CONSULTS
HP      Name: Fabian Manjarrez ADMIT: 12/10/2022   : 1955  PCP: Provider, No Known    MRN: [id number] LOS: 2 days   AGE/SEX: 67 y.o. male  ROOM:      No chief complaint on file.      Subjective        History of present illness  Fabian Manjarrez is a 67-year-old male patient who has coronary artery disease status post bypass in 2016, he had postop A. fib which resolved, sick sinus syndrome status post dual-chamber pacemaker [employer] in 2018, right bundle branch block, also hypertension, dyslipidemia, COPD, interstitial lung disease.  His last heart cath was in 2022 in [place] which showed patent LIMA to LAD and no amenable lesions for revascularization.  Patient presented to the hospital with complaints of shortness of breath and palpitations and was found to be in atrial flutter with rapid ventricular rates.    Past Medical History:   Diagnosis Date   • Arthritis    • COPD (chronic obstructive pulmonary disease) (HCC)    • Coronary artery disease    • Elevated cholesterol    • GERD (gastroesophageal reflux disease)    • History of transfusion    • Hypertension      Past Surgical History:   Procedure Laterality Date   • ABDOMINAL SURGERY     • CARDIAC CATHETERIZATION     • CARDIAC SURGERY     • COLON SURGERY     • HERNIA REPAIR       History reviewed. No pertinent family history.  Social History     Tobacco Use   • Smoking status: Never     Passive exposure: Never   • Smokeless tobacco: Never   Vaping Use   • Vaping Use: Never used   Substance Use Topics   • Drug use: Never     Medications Prior to Admission   Medication Sig Dispense Refill Last Dose   • albuterol sulfate  (90 Base) MCG/ACT inhaler Inhale 1 puff Every 4 (Four) Hours As Needed for Wheezing.   2022   • aspirin 81 MG EC tablet Take 81 mg by mouth Daily.   2022   • buPROPion SR (WELLBUTRIN SR) 100 MG 12 hr tablet Take 100 mg by mouth Daily.   2022   • docusate sodium (COLACE) 100 MG capsule Take 100 mg by mouth  Daily As Needed for Constipation.   12/9/2022   • ezetimibe (ZETIA) 10 MG tablet Take 10 mg by mouth Daily.   12/9/2022   • ferrous sulfate 325 (65 FE) MG tablet Take 40 mg by mouth Daily With Breakfast.   12/9/2022   • FLUoxetine (PROzac) 20 MG capsule Take 40 mg by mouth Daily.   12/9/2022   • guaiFENesin (MUCINEX) 600 MG 12 hr tablet Take 600 mg by mouth Daily.   12/9/2022   • HYDROcodone-acetaminophen (NORCO)  MG per tablet Take 1 tablet by mouth Every 4 (Four) Hours As Needed for Moderate Pain.   12/9/2022   • metoprolol tartrate (LOPRESSOR) 25 MG tablet Take 25 mg by mouth 2 (Two) Times a Day.   12/9/2022   • pantoprazole (PROTONIX) 40 MG EC tablet Take 40 mg by mouth 2 (Two) Times a Day.   12/9/2022   • predniSONE (DELTASONE) 5 MG tablet Take 5 mg by mouth Daily.   12/9/2022   • pregabalin (LYRICA) 75 MG capsule Take 75 mg by mouth 2 (Two) Times a Day.   12/9/2022   • ranolazine (RANEXA) 500 MG 12 hr tablet Take 1,000 mg by mouth 2 (Two) Times a Day.   12/9/2022   • rosuvastatin (CRESTOR) 20 MG tablet Take 20 mg by mouth Every Night.   12/9/2022   • tamsulosin (FLOMAX) 0.4 MG capsule 24 hr capsule Take 1 capsule by mouth Daily.   12/9/2022   • vitamin B-12 (CYANOCOBALAMIN) 1000 MCG tablet Take 1,000 mcg by mouth Daily.   12/9/2022     Allergies:  Nitroglycerin and Nsaids    Review of systems    Constitutional: Negative.    Respiratory and cardiovascular: As detailed in HPI section.  Gastrointestinal: Negative for constipation, nausea and vomiting negative for abdominal distention, abdominal pain and diarrhea.   Genitourinary: Negative for difficulty urinating and flank pain.   Musculoskeletal: Negative for arthralgias, joint swelling and myalgias.   Skin: Negative for color change, rash and wound.   Neurological: Negative for dizziness, syncope, weakness and headaches.   Hematological: Negative for adenopathy.   Psychiatric/Behavioral: Negative for confusion.   All other systems reviewed and are  negative.       Physical Exam  VITALS REVIEWED    General:      well developed, in no acute distress.    Head:      normocephalic and atraumatic.    Eyes:      PERRL/EOM intact, conjunctiva and sclera clear with out nystagmus.    Neck:      no masses, thyromegaly,  trachea central with normal respiratory effort and PMI displaced laterally  Lungs:      Clear to auscultation bilaterally  Heart:       Tachycardic  Msk:      no deformity or scoliosis noted of thoracic or lumbar spine.    Pulses:      pulses normal in all 4 extremities.    Extremities:       No lower extremity edema  Neurologic:      no focal deficits.   alert oriented x3  Skin:      intact without lesions or rashes.    Psych:      alert and cooperative; normal mood and affect; normal attention span and concentration.      Result Review :               Pertinent cardiac workup    1. Echo 12/11/2022 ejection fraction 40%, probably due to extreme tachycardia.  2. EKG 12/10/2022 atrial flutter ventricular rate 126 bpm, right bundle branch block.  3. EKG 12/10/2022, sinus rhythm right bundle branch block        Assessment and Plan         Atrial fibrillation with rapid ventricular response (HCC)      Fabian Manjarrez is a 67-year-old male patient who has coronary artery disease as detailed above, presents to the hospital with atrial flutter with rapid ventricular rates.  I reviewed the telemetry strips, most of the arrhythmia is atrial flutter, however some telemetry strips suggest presence of atrial fibrillation as well.  He has had history of A. fib postop.  Patient was started on IV amiodarone but that was discontinued.  I will go ahead and start him on p.o. amiodarone, for now we will proceed with LIAM guided cardioversion in the morning and keep him on antiarrhythmics and schedule him for outpatient combined A. fib and flutter ablation sometime in January.        No follow-ups on file.  Patient was given instructions and counseling regarding his condition  or for health maintenance advice. Please see specific information pulled into the AVS if appropriate.

## 2022-12-12 NOTE — PLAN OF CARE
Goal Outcome Evaluation:      Pt remains in aflutter, possible cardioversion today. Still on 5L nasal cannula. Currently on a heparin gtt. Resting overnight  Problem: Adult Inpatient Plan of Care  Goal: Absence of Hospital-Acquired Illness or Injury  Intervention: Identify and Manage Fall Risk  Recent Flowsheet Documentation  Taken 12/12/2022 0200 by Janett Toribio RN  Safety Promotion/Fall Prevention: safety round/check completed  Taken 12/12/2022 0000 by Janett Toribio RN  Safety Promotion/Fall Prevention: safety round/check completed  Taken 12/11/2022 2200 by Janett Toribio RN  Safety Promotion/Fall Prevention: safety round/check completed  Taken 12/11/2022 2000 by Janett Toribio RN  Safety Promotion/Fall Prevention: safety round/check completed  Intervention: Prevent Skin Injury  Recent Flowsheet Documentation  Taken 12/12/2022 0000 by Janett Toribio RN  Skin Protection: adhesive use limited  Taken 12/11/2022 2000 by Janett Toribio RN  Skin Protection: adhesive use limited  Intervention: Prevent and Manage VTE (Venous Thromboembolism) Risk  Recent Flowsheet Documentation  Taken 12/11/2022 2000 by Janett Toribio RN  Activity Management: activity adjusted per tolerance  Intervention: Prevent Infection  Recent Flowsheet Documentation  Taken 12/12/2022 0200 by Janett Toribio RN  Infection Prevention: rest/sleep promoted  Taken 12/12/2022 0000 by Janett Toribio RN  Infection Prevention: rest/sleep promoted  Taken 12/11/2022 2200 by Janett Toribio RN  Infection Prevention: rest/sleep promoted  Taken 12/11/2022 2000 by Janett Toribio RN  Infection Prevention: rest/sleep promoted  Goal: Optimal Comfort and Wellbeing  Intervention: Monitor Pain and Promote Comfort  Recent Flowsheet Documentation  Taken 12/12/2022 0000 by Janett Toribio RN  Pain Management Interventions: see MAR  Intervention: Provide Person-Centered Care  Recent Flowsheet  Documentation  Taken 12/11/2022 2000 by Janett Toribio RN  Trust Relationship/Rapport:   care explained   questions answered     Problem: Fall Injury Risk  Goal: Absence of Fall and Fall-Related Injury  Intervention: Identify and Manage Contributors  Recent Flowsheet Documentation  Taken 12/11/2022 2000 by Janett Toribio RN  Medication Review/Management: medications reviewed  Intervention: Promote Injury-Free Environment  Recent Flowsheet Documentation  Taken 12/12/2022 0200 by Janett Toribio RN  Safety Promotion/Fall Prevention: safety round/check completed  Taken 12/12/2022 0000 by Janett Toribio RN  Safety Promotion/Fall Prevention: safety round/check completed  Taken 12/11/2022 2200 by Janett Toribio RN  Safety Promotion/Fall Prevention: safety round/check completed  Taken 12/11/2022 2000 by Janett Toribio RN  Safety Promotion/Fall Prevention: safety round/check completed     Problem: Pain Acute  Goal: Acceptable Pain Control and Functional Ability  Intervention: Prevent or Manage Pain  Recent Flowsheet Documentation  Taken 12/11/2022 2000 by Janett Toribio RN  Medication Review/Management: medications reviewed  Intervention: Develop Pain Management Plan  Recent Flowsheet Documentation  Taken 12/12/2022 0000 by Janett Toribio RN  Pain Management Interventions: see MAR

## 2022-12-12 NOTE — CASE MANAGEMENT/SOCIAL WORK
Discharge Planning Assessment  [facility]     Patient Name: Fabian Manjarrez  MRN: [id number]  Today's Date: 12/12/2022    Admit Date: 12/10/2022    Plan: DC Plan: Anticipate routine home, watch O2 needs, may require walking oximetry to be performed 24-48 hrs prior to dc.   Discharge Needs Assessment     Row Name 12/12/22 1718       Living Environment    People in Home spouse    Current Living Arrangements home    Primary Care Provided by self    Provides Primary Care For no one    Family Caregiver if Needed spouse    Quality of Family Relationships unable to assess    Able to Return to Prior Arrangements yes       Resource/Environmental Concerns    Resource/Environmental Concerns none    Transportation Concerns none       Transition Planning    Patient/Family Anticipates Transition to home with family    Patient/Family Anticipated Services at Transition none    Transportation Anticipated family or friend will provide;car, drives self       Discharge Needs Assessment    Readmission Within the Last 30 Days no previous admission in last 30 days    Equipment Currently Used at Home cane, straight    Concerns to be Addressed care coordination/care conferences;discharge planning    Anticipated Changes Related to Illness none    Equipment Needed After Discharge other (see comments)  Watch O2 needs    Provided Post Acute Provider List? N/A               Discharge Plan     Row Name 12/12/22 7685       Plan    Plan DC Plan: Anticipate routine home, watch O2 needs, may require walking oximetry to be performed 24-48 hrs prior to dc.    Patient/Family in Agreement with Plan yes    Plan Comments CM met with patient at bedside to discuss dc planning. PCP (Hari Burdick in [place]) and pharmacy confirmed, reported no trouble affording medications, and declined needs at this time for any DME/HH/PT services. DC Barriers: Heparin gtt, scheduled to have LIAM cardioversion performed tomorrow, 12/13.               Demographic Summary     Row  Name 12/12/22 1718       General Information    Admission Type inpatient    Required Notices Provided Important Message from Medicare    Referral Source admission list    Reason for Consult discharge planning    Preferred Language English       Contact Information    Permission Granted to Share Info With                Functional Status     Row Name 12/12/22 1718       Functional Status    Usual Activity Tolerance good    Current Activity Tolerance good       Functional Status, IADL    Medications independent    Meal Preparation independent    Housekeeping independent    Laundry independent    Shopping independent              Met with patient in room wearing PPE: mask.      Maintained distance greater than six feet and spent less than 15 minutes in the room.        Kierra Butt RN     Office Phone: [phone]  Office Cell: [phone]

## 2022-12-12 NOTE — CONSULTS
Group: [facility]         CONSULT NOTE    Patient Identification:  Fabian Manjarrez  67 y.o.  male  1955  [id number]            Requesting physician: Attending physician    Reason for Consultation: Interstitial Lung Disease       History of Present Illness:    Fabian Manjarrez is a 66 y/o male who presents to [facility] on 12/10/2022 from [facility] for Atrial Fibrillation and acute on chronic respiratory failure.  He was recently admitted at [facility] from 12/7/2022 to 12/9/2022 for COPD exacerbation, atypical pneumonia, and WOLF related to obstructive uropathy.  When discharged his dyspnea persisted which brought him to ED for further evaluation.      Assessment:    Acute respiratory failure with hypoxia  Chronic Obstructive Pulmonary Disease  -recently discharged from [facility] on 2L oxygen     Interstitial Lung Disease:  [facility] chest ct 8/2022 shows ULP with honeycombing and bronchiectasis   -recently treated with tapering dose of prednisone     Rheumatoid arthritis, treated with Leflunomide (Arava) and chronic prednsione  -followed by Dr Lauren    Atrial Fibrillation with RVR  SSS s/p pacer    12/11/2022 ECHO  EF 40%  RVSP 22.6 mmHg  -mild AV stenosis  Right ventricle severely dilated     Hypertension  GERD    Recommendations:    Chest CT without contrast, ILD    Titrate oxygen, currently requiring 5L per NC    Solumedrol 40 mg q8 hours  Mucinex BID  Bronchodilater  Amiodarone po        Review of Sytems:  Review of Systems   Respiratory: Positive for shortness of breath.        Past Medical History:  Past Medical History:   Diagnosis Date   • Arthritis    • COPD (chronic obstructive pulmonary disease) (HCC)    • Coronary artery disease    • Elevated cholesterol    • GERD (gastroesophageal reflux disease)    • History of transfusion    • Hypertension        Past Surgical History:  Past Surgical History:   Procedure Laterality Date   • ABDOMINAL SURGERY     •  CARDIAC CATHETERIZATION     • CARDIAC SURGERY     • COLON SURGERY     • HERNIA REPAIR          Home Meds:  Medications Prior to Admission   Medication Sig Dispense Refill Last Dose   • albuterol sulfate  (90 Base) MCG/ACT inhaler Inhale 1 puff Every 4 (Four) Hours As Needed for Wheezing.   12/9/2022   • aspirin 81 MG EC tablet Take 81 mg by mouth Daily.   12/9/2022   • buPROPion SR (WELLBUTRIN SR) 100 MG 12 hr tablet Take 100 mg by mouth Daily.   12/9/2022   • docusate sodium (COLACE) 100 MG capsule Take 100 mg by mouth Daily As Needed for Constipation.   12/9/2022   • ezetimibe (ZETIA) 10 MG tablet Take 10 mg by mouth Daily.   12/9/2022   • ferrous sulfate 325 (65 FE) MG tablet Take 40 mg by mouth Daily With Breakfast.   12/9/2022   • FLUoxetine (PROzac) 20 MG capsule Take 40 mg by mouth Daily.   12/9/2022   • guaiFENesin (MUCINEX) 600 MG 12 hr tablet Take 600 mg by mouth Daily.   12/9/2022   • HYDROcodone-acetaminophen (NORCO)  MG per tablet Take 1 tablet by mouth Every 4 (Four) Hours As Needed for Moderate Pain.   12/9/2022   • metoprolol tartrate (LOPRESSOR) 25 MG tablet Take 25 mg by mouth 2 (Two) Times a Day.   12/9/2022   • pantoprazole (PROTONIX) 40 MG EC tablet Take 40 mg by mouth 2 (Two) Times a Day.   12/9/2022   • predniSONE (DELTASONE) 5 MG tablet Take 5 mg by mouth Daily.   12/9/2022   • pregabalin (LYRICA) 75 MG capsule Take 75 mg by mouth 2 (Two) Times a Day.   12/9/2022   • ranolazine (RANEXA) 500 MG 12 hr tablet Take 1,000 mg by mouth 2 (Two) Times a Day.   12/9/2022   • rosuvastatin (CRESTOR) 20 MG tablet Take 20 mg by mouth Every Night.   12/9/2022   • tamsulosin (FLOMAX) 0.4 MG capsule 24 hr capsule Take 1 capsule by mouth Daily.   12/9/2022   • vitamin B-12 (CYANOCOBALAMIN) 1000 MCG tablet Take 1,000 mcg by mouth Daily.   12/9/2022       Allergies:  Allergies   Allergen Reactions   • Nitroglycerin Headache   • Nsaids GI Bleeding       Social History:   Social History  "    Socioeconomic History   • Marital status:    Tobacco Use   • Smoking status: Never     Passive exposure: Never   • Smokeless tobacco: Never   Vaping Use   • Vaping Use: Never used   Substance and Sexual Activity   • Drug use: Never   • Sexual activity: Defer       Family History:  History reviewed. No pertinent family history.    Physical Exam:  /78   Pulse 119   Temp 98 °F (36.7 °C) (Oral)   Resp 16   Ht 170.2 cm (67\")   Wt 72.5 kg (159 lb 13.3 oz)   SpO2 99%   BMI 25.03 kg/m²  Body mass index is 25.03 kg/m². 99% 72.5 kg (159 lb 13.3 oz)  Physical Exam  Vitals reviewed.   Pulmonary:      Effort: Pulmonary effort is normal.      Breath sounds: Rhonchi present.   Skin:     General: Skin is warm and dry.   Neurological:      Mental Status: He is alert and oriented to person, place, and time.         LABS:  Lab Results   Component Value Date    CALCIUM 8.4 (L) 12/12/2022     Results from last 7 days   Lab Units 12/12/22  0457 12/11/22  0042   SODIUM mmol/L 134* 135*   POTASSIUM mmol/L 4.4 3.9   CHLORIDE mmol/L 98 97*   CO2 mmol/L 24.0 24.0   BUN mg/dL 18 17   CREATININE mg/dL 0.90 1.15   GLUCOSE mg/dL 159* 92   CALCIUM mg/dL 8.4* 8.2*   WBC 10*3/mm3 14.30* 11.60*   HEMOGLOBIN g/dL 9.7* 8.7*   PLATELETS 10*3/mm3 266 300   ALT (SGPT) U/L  --  12   AST (SGOT) U/L  --  27   PROBNP pg/mL  --  8,457.0*   PROCALCITONIN ng/mL  --  0.11     Lab Results   Component Value Date    TROPONINT 0.027 12/12/2022     Results from last 7 days   Lab Units 12/12/22  0457 12/11/22  0752 12/11/22  0042   TROPONIN T ng/mL 0.027 0.044* 0.056*         Results from last 7 days   Lab Units 12/11/22  0042   PROCALCITONIN ng/mL 0.11             Results from last 7 days   Lab Units 12/11/22  0042   INR  1.08         No results found for: TSH  Estimated Creatinine Clearance: 80.6 mL/min (by C-G formula based on SCr of 0.9 mg/dL).         Imaging:  Imaging Results (Last 24 Hours)     Procedure Component Value Units Date/Time    " US Outside Films [[id number]] Resulted: 12/12/22 0933     Updated: 12/12/22 0933    Narrative:      This procedure was auto-finalized with no dictation required.    CT Outside Films [[id number]] Resulted: 12/12/22 0932     Updated: 12/12/22 0932    Narrative:      This procedure was auto-finalized with no dictation required.    NM Outside Films [[id number]] Resulted: 12/12/22 0932     Updated: 12/12/22 0932    Narrative:      This procedure was auto-finalized with no dictation required.    XR Outside Films [[id number]] Resulted: 12/12/22 0932     Updated: 12/12/22 0932    Narrative:      This procedure was auto-finalized with no dictation required.    XR Outside Films [[id number]] Resulted: 12/12/22 0931     Updated: 12/12/22 0931    Narrative:      This procedure was auto-finalized with no dictation required.            Current Meds:   SCHEDULE  amiodarone, 200 mg, Oral, Q12H  aspirin, 81 mg, Oral, Daily  buPROPion SR, 100 mg, Oral, Daily  ferrous sulfate, 324 mg, Oral, Daily With Breakfast  FLUoxetine, 40 mg, Oral, Daily  guaiFENesin, 600 mg, Oral, Daily  ipratropium-albuterol, 3 mL, Nebulization, 4x Daily - RT  methylPREDNISolone sodium succinate, 40 mg, Intravenous, Q12H  pantoprazole, 40 mg, Oral, BID  pregabalin, 75 mg, Oral, BID  ranolazine, 1,000 mg, Oral, BID  rosuvastatin, 20 mg, Oral, Nightly  sodium chloride, 10 mL, Intravenous, Q12H  tamsulosin, 0.4 mg, Oral, Daily      Infusions  heparin, 12 Units/kg/hr, Last Rate: 18 Units/kg/hr (12/12/22 0613)      PRNs  •  acetaminophen  •  heparin  •  heparin  •  HYDROcodone-acetaminophen  •  Morphine  •  ondansetron  •  sodium chloride  •  sodium chloride        LE Santana  12/12/2022  11:42 EST      Much of this encounter note is an electronic transcription/translation of spoken language to printed text using Dragon Software.

## 2022-12-12 NOTE — PLAN OF CARE
Goal Outcome Evaluation:         Pt is still on heparin gtt today and will be having a LIAM and cardioversion tomorrow in the am. Spouse at bedside, will continue to monitor

## 2022-12-13 NOTE — PLAN OF CARE
Goal Outcome Evaluation:         Patient AV paced; heart rate WNL. Pain medication given with + effects. Wife at bedside throughout the day. Patient on 5L NC vs 4L HFNC. Started on Xarelto.

## 2022-12-13 NOTE — PROGRESS NOTES
Daily Progress Note        Atrial fibrillation with rapid ventricular response (HCC)    COPD (chronic obstructive pulmonary disease) (HCC)    Primary hypertension    Coronary artery disease involving autologous vein coronary bypass graft without angina pectoris    GERD without esophagitis    Primary osteoarthritis involving multiple joints    Mixed hyperlipidemia    Acute on chronic respiratory failure with hypercapnia (HCC)      Assessment    Acute respiratory failure with hypoxia  Chronic Obstructive Pulmonary Disease  -recently discharged from [facility] on 2L oxygen      Interstitial Lung Disease:  [facility] chest ct 8/2022 shows ULP with honeycombing and bronchiectasis   -recently treated with tapering dose of prednisone      Rheumatoid arthritis, treated with Leflunomide (Arava) and chronic prednsione  -followed by Dr Lauren     Atrial Fibrillation with RVR  SSS s/p pacer     12/11/2022 ECHO  EF 40%  RVSP 22.6 mmHg  -mild AV stenosis  -Right ventricle severely dilated      Hypertension  GERD     Recommendations:      Will start Bactrim DS 1 PO BID   Solumedrol 40 mg q8 hours  Patient is immunocompromised on chronic prednisone for RA    Titrate oxygen, currently requiring 4L per NC     Mucinex BID  Bronchodilater  Amiodarone po          LOS: 3 days     Subjective         Objective     Vital signs for last 24 hours:  Vitals:    12/12/22 1926 12/12/22 2045 12/13/22 0153 12/13/22 0619   BP:  123/72 121/73 117/74   BP Location:  Right arm Right arm Right arm   Patient Position:  Lying Lying Lying   Pulse: (!) 122 (!) 121 (!) 127 (!) 134   Resp: 13 14 14 16   Temp:  97.8 °F (36.6 °C) 97.5 °F (36.4 °C) 97.6 °F (36.4 °C)   TempSrc:  Oral Oral Oral   SpO2: 94% 91% 92% 95%   Weight:    71 kg (156 lb 8.4 oz)   Height:           Intake/Output last 3 shifts:  I/O last 3 completed shifts:  In: 1200 [P.O.:1200]  Out: 1000 [Urine:1000]  Intake/Output this shift:  I/O this shift:  In: 240 [P.O.:240]  Out: 1350  [Urine:1350]      Radiology  Imaging Results (Last 24 Hours)     Procedure Component Value Units Date/Time    CT Chest Without Contrast Diagnostic [[id number]] Collected: 12/12/22 1628     Updated: 12/12/22 1635    Narrative:         DATE OF EXAM:  12/12/2022 3:42 PM     PROCEDURE:  CT CHEST WO CONTRAST DIAGNOSTIC-     INDICATIONS:   Interstitial lung disease (Ped 1-17y); [id number] for follow-up  examination after completed treatment for conditions other than  malignant neoplasm     COMPARISON:   No Comparisons Available     TECHNIQUE:  Routine transaxial slices were obtained through the chest without the  administration of intravenous contrast. Reconstructed coronal and  sagittal images were also obtained. Automated exposure control and  iterative construction methods were used.     FINDINGS:  There is extensive abnormal lucency seen throughout the lungs most  suggestive of severe bullous changes. The findings are most pronounced  in the upper lobes. Associated bronchiectasis and peribronchial  thickening are seen throughout the lungs. Scattered peripheral areas of  septal thickening with groundglass density and cystic changes are seen  suggesting a component of fibrosis. The extensive lucencies may also  indicate underlying chronic cystic lung disease.     No well-defined consolidations or pleural effusions are seen. Scattered  calcified granulomas are noted. No dominant suspicious nodules or masses  are seen.     No significant hilar, mediastinal, or axillary lymphadenopathy is  observed. Multiple calcified hilar and mediastinal lymph nodes are  noted. A normal aortic arch branching pattern is identified.  Postoperative changes are observed with suggestion of prior cardiac  bypass. The thyroid gland is unremarkable. The esophagus is  unremarkable.     The limited evaluation of the upper abdomen demonstrates no evidence for  acute abnormality.     No acute osseous abnormalities are observed.       Impression:       1.     No evidence for acute intrathoracic abnormality.  2.     Extensive changes throughout the lungs most consistent with  advanced emphysema/COPD with a component of mild peripheral fibrosis.  Other underlying chronic interstitial lung disease or cystic lung  disease could be considered.  3.     Granulomatous inflammatory changes are noted with scattered  calcified granulomas bilaterally. No dominant suspicious nodule or mass  is seen.     Electronically Signed By-Jenaro Timmons MD On:12/12/2022 4:33 PM  This report was finalized on [id number] by  Jenaro Timmons MD.    US Outside Films [[id number]] Resulted: 12/12/22 0933     Updated: 12/12/22 0933    Narrative:      This procedure was auto-finalized with no dictation required.    CT Outside Films [[id number]] Resulted: 12/12/22 0932     Updated: 12/12/22 0932    Narrative:      This procedure was auto-finalized with no dictation required.    NM Outside Films [[id number]] Resulted: 12/12/22 0932     Updated: 12/12/22 0932    Narrative:      This procedure was auto-finalized with no dictation required.    XR Outside Films [[id number]] Resulted: 12/12/22 0932     Updated: 12/12/22 0932    Narrative:      This procedure was auto-finalized with no dictation required.    XR Outside Films [[id number]] Resulted: 12/12/22 0931     Updated: 12/12/22 0931    Narrative:      This procedure was auto-finalized with no dictation required.          Labs:  Results from last 7 days   Lab Units 12/12/22  2239   WBC 10*3/mm3 21.90*   HEMOGLOBIN g/dL 9.7*   HEMATOCRIT % 30.1*   PLATELETS 10*3/mm3 317     Results from last 7 days   Lab Units 12/12/22  0457 12/11/22  0042   SODIUM mmol/L 134* 135*   POTASSIUM mmol/L 4.4 3.9   CHLORIDE mmol/L 98 97*   CO2 mmol/L 24.0 24.0   BUN mg/dL 18 17   CREATININE mg/dL 0.90 1.15   CALCIUM mg/dL 8.4* 8.2*   BILIRUBIN mg/dL  --  0.4   ALK PHOS U/L  --  174*   ALT (SGPT) U/L  --  12   AST (SGOT) U/L  --  27   GLUCOSE mg/dL 159* 92         Results  from last 7 days   Lab Units 12/11/22  0042   ALBUMIN g/dL 2.90*     Results from last 7 days   Lab Units 12/12/22  0457 12/11/22  0752 12/11/22  0042   TROPONIN T ng/mL 0.027 0.044* 0.056*             Results from last 7 days   Lab Units 12/13/22  0450 12/12/22  2239 12/12/22  1439 12/11/22  0550 12/11/22  0042   INR   --   --   --   --  1.08   APTT seconds 76.6* 63.2 51.5*   < > 25.9*    < > = values in this interval not displayed.               Meds:   SCHEDULE  amiodarone, 200 mg, Oral, Q12H  aspirin, 81 mg, Oral, Daily  buPROPion SR, 100 mg, Oral, Daily  ferrous sulfate, 324 mg, Oral, Daily With Breakfast  FLUoxetine, 40 mg, Oral, Daily  guaiFENesin, 600 mg, Oral, Daily  ipratropium-albuterol, 3 mL, Nebulization, 4x Daily - RT  methylPREDNISolone sodium succinate, 40 mg, Intravenous, Q12H  pantoprazole, 40 mg, Oral, BID  pregabalin, 75 mg, Oral, BID  ranolazine, 1,000 mg, Oral, BID  rosuvastatin, 20 mg, Oral, Nightly  sodium chloride, 10 mL, Intravenous, Q12H  tamsulosin, 0.4 mg, Oral, Daily      Infusions  heparin, 12 Units/kg/hr, Last Rate: 17 Units/kg/hr (12/13/22 0539)      PRNs  •  acetaminophen  •  heparin  •  heparin  •  HYDROcodone-acetaminophen  •  Morphine  •  ondansetron  •  sodium chloride  •  sodium chloride    Physical Exam:  Physical Exam  Vitals reviewed.   Pulmonary:      Effort: Pulmonary effort is normal.      Breath sounds: Rhonchi present.   Skin:     General: Skin is warm and dry.   Neurological:      Mental Status: He is alert and oriented to person, place, and time.         ROS  Review of Systems   Respiratory: Positive for cough and shortness of breath.        I have reviewed the patient's new clinical results.    Electronically signed by Vera Marlow MD

## 2022-12-13 NOTE — PROGRESS NOTES
[facility] Medicine Services Daily Progress Note    Patient Name: Fabian Manjarrez  : 1955  MRN: [id number]  Primary Care Physician:  Addison Burdick MD  Date of admission: 12/10/2022      Subjective      Chief Complaint: Shortness of breath.      Patient Reports:        2022.  Patient was seen and examined.  Patient reported no new symptoms.  Patient requested a diet.      2022.  Patient was seen and examined.  Patient reported significant improvement in his symptoms.  Patient is status post cardioversion.      Review of Systems   Constitutional: Positive for malaise/fatigue.   HENT: Negative.    Eyes: Negative.    Cardiovascular: Negative for palpitations.   Respiratory: Negative for shortness of breath.    Endocrine: Negative.    Hematologic/Lymphatic: Negative.    Skin: Negative.    Musculoskeletal: Negative.    Gastrointestinal: Negative.    Genitourinary: Negative.    Neurological: Negative.    Psychiatric/Behavioral: Negative.    Allergic/Immunologic: Negative.             Objective      Vitals:   Temp:  [97.4 °F (36.3 °C)-97.9 °F (36.6 °C)] 97.4 °F (36.3 °C)  Heart Rate:  [] 76  Resp:  [11-18] 12  BP: ()/(52-77) 126/68  Flow (L/min):  [4-10] 5    Physical Exam  Vitals reviewed.   HENT:      Head: Normocephalic.      Nose: Nose normal.      Mouth/Throat:      Mouth: Mucous membranes are dry.      Pharynx: Oropharynx is clear.   Eyes:      Extraocular Movements: Extraocular movements intact.      Conjunctiva/sclera: Conjunctivae normal.      Pupils: Pupils are equal, round, and reactive to light.   Cardiovascular:      Pulses: Normal pulses.      Heart sounds: No murmur heard.    No friction rub. No gallop.      Comments: S1 and S2 present.  No tachycardia.  Pulmonary:      Effort: Pulmonary effort is normal.      Breath sounds: No stridor. No wheezing or rales.   Chest:      Chest wall: No tenderness.   Abdominal:      General: Bowel sounds are normal.  There is no distension.      Palpations: Abdomen is soft. There is no mass.      Tenderness: There is no abdominal tenderness. There is no right CVA tenderness, left CVA tenderness, guarding or rebound.      Hernia: No hernia is present.   Musculoskeletal:         General: No swelling, tenderness, deformity or signs of injury.      Cervical back: Normal range of motion. No rigidity.      Right lower leg: No edema.      Left lower leg: No edema.   Skin:     General: Skin is warm and dry.      Capillary Refill: Capillary refill takes less than 2 seconds.      Coloration: Skin is not jaundiced.      Findings: No bruising, erythema, lesion or rash.   Neurological:      Comments: No facial asymmetry noted.  Gait and station not tested.   Psychiatric:      Comments: No agitation.               Result Review    Result Review:  I have personally reviewed the results from the time of this admission to 12/13/2022 15:02 EST and agree with these findings:  []  Laboratory  []  Microbiology  []  Radiology  []  EKG/Telemetry   []  Cardiology/Vascular   []  Pathology  []  Old records  []  Other:  Most notable findings include:          Assessment & Plan    From previous notes and with minor updates.    Brief Patient Summary:    Patient is a 67 y.o. male with past medical history of COPD, hypertension, hyperlipidemia, osteoarthritis, chronic CAD  and GERD who presented to [facility] on 12/10/2022 complaining of above.   Notified of transfer arrival to PCU unit by nursing staff.  Patient was accepted for transfer from [facility] ED earlier today by Dr. Avelar for A. fib with RVR and acute on chronic hypoxic respiratory failure with possible pulmonary edema.  History is somewhat challenging though he is alert oriented x3 and able to provide history, he sometimes gets lost in the information that he provides and what timing of events.  He has extensive past medical history-see assessment/plan for details.  Per  available outside records the following was found.  He was admitted to [facility] between 12/7/2022 through 12/9/2022 and managed for acute COPD exacerbation, possible atypical pneumonia, and WOLF related to obstructive uropathy.  He was not on supplemental oxygen prior to this time though was discharged with 2 L nasal cannula and a course of empiric antibiotics.  VQ scan during that hospitalization was low probability of PE.  He had mild WOLF with a creatinine of 2. This improved to discharge creatinine of 1.4 with medical management and after pop cathether placement for obstructive uropathy.  He was able to void after the Pop catheter was removed and he was discharged with Flomax with recommendations to follow-up with urology due to questionable filling defect related to the prostate versus bladder mass measuring 1.5 cm in size.  Outpatient follow-up with urology was recommended  He went home and over the course of 24 hours still had persistent shortness of breath which prompted his presentation again to the emergency room.  At this time he was found to be in A. fib with RVR and was initiated on amiodarone and given IV Lasix 40 mg x 1. Chest x-ray revealed stable diffuse interstitial changes bilaterally with stable patchy multifocal airspace infiltrates. His white blood cell count was 20 hemoglobin was 9.7 platelets were 352.  Sodium was 135 creatinine was 1.2.  Albumin was 3.  BNP was 1095. Troponin I was 0.07.  Influenza panel COVID-19 and RSV were negative.  Transfer to tertiary facility was requested.     On arrival to our facility he is on 6 L nasal cannula.  He denies any active chest pain though reports 8 out of 10 headache that is more right-sided with some sensitivity to light.  He describes a history of migraine headaches every few weeks and this is similar.  No focal deficits are noted.  Mild nausea as described.  She denies sinus tenderness.  No rhinorrhea.  Endorses generalized  myalgias.  Denies hemoptysis.  Denies travel history.  No known sick contacts.  Cardiology initiated heparin drip on arrival.         amiodarone, 200 mg, Oral, Q12H  aspirin, 81 mg, Oral, Daily  buPROPion SR, 100 mg, Oral, Daily  ferrous sulfate, 324 mg, Oral, Daily With Breakfast  FLUoxetine, 40 mg, Oral, Daily  guaiFENesin, 600 mg, Oral, Daily  ipratropium-albuterol, 3 mL, Nebulization, 4x Daily - RT  methylPREDNISolone sodium succinate, 40 mg, Intravenous, Q12H  pantoprazole, 40 mg, Oral, BID  pregabalin, 75 mg, Oral, BID  ranolazine, 1,000 mg, Oral, BID  rivaroxaban, 20 mg, Oral, Daily With Dinner  rosuvastatin, 20 mg, Oral, Nightly  sodium chloride, 10 mL, Intravenous, Q12H  sulfamethoxazole-trimethoprim, 1 tablet, Oral, Q12H  tamsulosin, 0.4 mg, Oral, Daily  vitamin B-12, 1,000 mcg, Oral, Daily       sodium chloride, 30 mL/hr, Last Rate: Stopped (12/13/22 0815)         Active Hospital Problems:  Active Hospital Problems    Diagnosis    • **Atrial fibrillation with rapid ventricular response (HCC)    • Acute on chronic respiratory failure with hypercapnia (HCC)    • COPD (chronic obstructive pulmonary disease) (HCC)    • Primary hypertension    • Coronary artery disease involving autologous vein coronary bypass graft without angina pectoris    • GERD without esophagitis    • Primary osteoarthritis involving multiple joints    • Mixed hyperlipidemia          Plan:      -Continue appropriate patient's home medications for other chronic medical conditions.  -Continue the present level of care.  -Patient and family agreed with the plan of care.  -Treat acute on chronic respiratory failure with oxygen therapy.  -Follow pulmonary recommendations.  -Treat atrial fibrillation with RVR with a rate control strategy.  -Follow cardiology recommendations.  -Treat GERD with Protonix.  -Treated primary osteoarthritis with Tylenol.        DVT prophylaxis:  Medical and mechanical DVT prophylaxis orders are present.    CODE  STATUS:    Code Status (Patient has no pulse and is not breathing): CPR (Attempt to Resuscitate)  Medical Interventions (Patient has pulse or is breathing): Full Support  Release to patient: Routine Release      Disposition: This wonderful patient can discharge in the next 48 to 72 hours.    This patient has been examined wearing appropriate Personal Protective Equipment and discussed with hospital infection control department, [facility], infectious disease specialist and pulmonologist. 12/13/22      Electronically signed by Delvin Da Silva MD, FACP, 12/13/22, 15:02 EST.      [facility] Hospitalist Team

## 2022-12-13 NOTE — CASE MANAGEMENT/SOCIAL WORK
Continued Stay Note   Sj     Patient Name: Fabian Manjarrez  MRN: [id number]  Today's Date: 12/13/2022    Admit Date: 12/10/2022    Plan: DC Plan: Anticipate routine home. Has home O2. Xarelto $121 per month, pt agreeable.   Discharge Plan     Row Name 12/13/22 1621       Plan    Plan DC Plan: Anticipate routine home. Has home O2. Xarelto $121 per month, pt agreeable.    Patient/Family in Agreement with Plan yes    Plan Comments CM contacted [facility] to test claim cost of Xarelto. Cost would be $121.68. CM met with patient at bedside to discuss cost and IMM letter. Patient agreeable at this time. Reported that he gets around $900 per month social security. CM discussed O2 needs (currently on 5L). Patient reported that he has 2L O2 at home that was set up for him when he had pneumonia. DC Barriers: Scheduled to have cystoscopy performed tomorrow, 12/14.              Met with patient in room wearing PPE: mask.      Maintained distance greater than six feet and spent less than 15 minutes in the room.      Kierra Butt RN     Office Phone: [phone]  Office Cell: [phone]     Pre-Excision Curettage Text (Leave Blank If You Do Not Want): Prior to drawing the surgical margin the visible lesion was removed with electrodesiccation and curettage to clearly define the lesion size.

## 2022-12-13 NOTE — PROGRESS NOTES
"    Reason for follow-up: Atrial flutter     Patient Care Team:  Addison Burdick MD as PCP - General (Family Medicine)    Subjective .   Fabian Manjarrez is doing well today     ROS    Leflunomide, Methotrexate, Nitroglycerin, Nsaids, and Plaquenil [hydroxychloroquine]    Scheduled Meds:amiodarone, 200 mg, Oral, Q12H  aspirin, 81 mg, Oral, Daily  buPROPion SR, 100 mg, Oral, Daily  ferrous sulfate, 324 mg, Oral, Daily With Breakfast  FLUoxetine, 40 mg, Oral, Daily  guaiFENesin, 600 mg, Oral, Daily  ipratropium-albuterol, 3 mL, Nebulization, 4x Daily - RT  methylPREDNISolone sodium succinate, 40 mg, Intravenous, Q12H  pantoprazole, 40 mg, Oral, BID  pregabalin, 75 mg, Oral, BID  ranolazine, 1,000 mg, Oral, BID  rivaroxaban, 20 mg, Oral, Daily With Dinner  rosuvastatin, 20 mg, Oral, Nightly  sodium chloride, 10 mL, Intravenous, Q12H  tamsulosin, 0.4 mg, Oral, Daily      Continuous Infusions:sodium chloride, 30 mL/hr, Last Rate: 30 mL/hr (12/13/22 0713)      PRN Meds:.•  acetaminophen  •  HYDROcodone-acetaminophen  •  Morphine  •  ondansetron  •  sodium chloride  •  sodium chloride      VITAL SIGNS  Vitals:    12/12/22 2045 12/13/22 0153 12/13/22 0619 12/13/22 0711   BP: 123/72 121/73 117/74 124/77   BP Location: Right arm Right arm Right arm Right arm   Patient Position: Lying Lying Lying Lying   Pulse: (!) 121 (!) 127 (!) 134 (!) 134   Resp: 14 14 16 18   Temp: 97.8 °F (36.6 °C) 97.5 °F (36.4 °C) 97.6 °F (36.4 °C) 97.9 °F (36.6 °C)   TempSrc: Oral Oral Oral Oral   SpO2: 91% 92% 95% 98%   Weight:   71 kg (156 lb 8.4 oz) 71 kg (156 lb 8.4 oz)   Height:    170.2 cm (67\")       Flowsheet Rows    Flowsheet Row First Filed Value   Admission Height 170.2 cm (67\") Documented at 12/10/2022 2257   Admission Weight 72.5 kg (159 lb 13.3 oz) Documented at 12/10/2022 2257             Physical Exam  VITALS REVIEWED    General:      well developed, in no acute distress.    Head:      normocephalic and atraumatic.    Eyes:      " PERRL/EOM intact, conjunctiva and sclera clear with out nystagmus.    Neck:      no masses, thyromegaly,  trachea central with normal respiratory effort and PMI displaced laterally  Lungs:      Clear  Heart:       Regular rate and rhythm  Msk:      no deformity or scoliosis noted of thoracic or lumbar spine.    Pulses:      pulses normal in all 4 extremities.    Extremities:       No lower extremity edema  Neurologic:      no focal deficits.   alert oriented x3  Skin:      intact without lesions or rashes.    Psych:      alert and cooperative; normal mood and affect; normal attention span and concentration.          LAB RESULTS (LAST 7 DAYS)    CBC  Results from last 7 days   Lab Units 12/12/22 2239 12/12/22 0457 12/11/22  0042   WBC 10*3/mm3 21.90* 14.30* 11.60*   RBC 10*6/mm3 3.14* 3.07* 2.78*   HEMOGLOBIN g/dL 9.7* 9.7* 8.7*   HEMATOCRIT % 30.1* 28.8* 26.2*   MCV fL 96.0 93.7 94.3   PLATELETS 10*3/mm3 317 266 300       BMP  Results from last 7 days   Lab Units 12/12/22 0457 12/11/22  0042   SODIUM mmol/L 134* 135*   POTASSIUM mmol/L 4.4 3.9   CHLORIDE mmol/L 98 97*   CO2 mmol/L 24.0 24.0   BUN mg/dL 18 17   CREATININE mg/dL 0.90 1.15   GLUCOSE mg/dL 159* 92       CMP   Results from last 7 days   Lab Units 12/12/22 0457 12/11/22  0042   SODIUM mmol/L 134* 135*   POTASSIUM mmol/L 4.4 3.9   CHLORIDE mmol/L 98 97*   CO2 mmol/L 24.0 24.0   BUN mg/dL 18 17   CREATININE mg/dL 0.90 1.15   GLUCOSE mg/dL 159* 92   ALBUMIN g/dL  --  2.90*   BILIRUBIN mg/dL  --  0.4   ALK PHOS U/L  --  174*   AST (SGOT) U/L  --  27   ALT (SGPT) U/L  --  12         BNP        TROPONIN  Results from last 7 days   Lab Units 12/12/22 0457   TROPONIN T ng/mL 0.027       CoAg  Results from last 7 days   Lab Units 12/13/22 0450 12/12/22 2239 12/12/22  1439 12/12/22 0457 12/11/22  2038 12/11/22  1456 12/11/22  0550 12/11/22  0042   INR   --   --   --   --   --   --   --  1.08   APTT seconds 76.6* 63.2 51.5* 44.1* 47.6* 39.3* 29.9* 25.9*        Creatinine Clearance  Estimated Creatinine Clearance: 78.9 mL/min (by C-G formula based on SCr of 0.9 mg/dL).    ABG          EKG    I personally reviewed the patient's EKG/Telemetry data: Atrial paced      Assessment & Plan       Atrial fibrillation with rapid ventricular response (HCC)    COPD (chronic obstructive pulmonary disease) (HCC)    Primary hypertension    Coronary artery disease involving autologous vein coronary bypass graft without angina pectoris    GERD without esophagitis    Primary osteoarthritis involving multiple joints    Mixed hyperlipidemia    Acute on chronic respiratory failure with hypercapnia (HCC)      Fabian Manjarrez  is a 67-year-old male patient who has coronary artery disease as detailed above, presents to the hospital with atrial flutter with rapid ventricular rates.  I reviewed the telemetry strips, most of the arrhythmia is atrial flutter, however some telemetry strips suggest presence of atrial fibrillation as well.  He has had history of A. fib postop.  Patient was started on IV amiodarone then switched to p.o.  LIAM guided cardioversion was performed on 12/13/2022 with return to sinus rhythm.  We will start him on Xarelto for stroke prevention.  We will also go ahead and schedule him for combined A. fib and flutter ablation as outpatient sometime in January    I discussed the patients findings and my recommendations with patient and agrees with outlined plan.    Shreya Pires MD  12/13/22  08:03 EST

## 2022-12-13 NOTE — PROGRESS NOTES
"  [facility] DAILY PROGRESS NOTE    Patient Identification  Name: Fabian Manjarrez  Age: 67 y.o.  Sex: male  :  1955  MRN: [id number]    Date: 2022             Subjective:  Interval History: Patient off floor    Objective:    Scheduled Meds:amiodarone, 200 mg, Oral, Q12H  aspirin, 81 mg, Oral, Daily  buPROPion SR, 100 mg, Oral, Daily  ferrous sulfate, 324 mg, Oral, Daily With Breakfast  FLUoxetine, 40 mg, Oral, Daily  guaiFENesin, 600 mg, Oral, Daily  ipratropium-albuterol, 3 mL, Nebulization, 4x Daily - RT  methylPREDNISolone sodium succinate, 40 mg, Intravenous, Q12H  pantoprazole, 40 mg, Oral, BID  pregabalin, 75 mg, Oral, BID  ranolazine, 1,000 mg, Oral, BID  rivaroxaban, 20 mg, Oral, Daily With Dinner  rosuvastatin, 20 mg, Oral, Nightly  sodium chloride, 10 mL, Intravenous, Q12H  tamsulosin, 0.4 mg, Oral, Daily      Continuous Infusions:sodium chloride, 30 mL/hr, Last Rate: 30 mL/hr (22 0713)      PRN Meds:•  acetaminophen  •  HYDROcodone-acetaminophen  •  Morphine  •  ondansetron  •  sodium chloride  •  sodium chloride    Vital signs in last 24 hours:  Temp:  [97.4 °F (36.3 °C)-98.5 °F (36.9 °C)] 97.9 °F (36.6 °C)  Heart Rate:  [118-134] 134  Resp:  [13-18] 18  BP: ()/(67-82) 124/77    Intake/Output:    Intake/Output Summary (Last 24 hours) at 2022 0805  Last data filed at 2022 0756  Gross per 24 hour   Intake 1120 ml   Output 2350 ml   Net -1230 ml       Exam:  /77 (BP Location: Right arm, Patient Position: Lying)   Pulse (!) 134   Temp 97.9 °F (36.6 °C) (Oral)   Resp 18   Ht 170.2 cm (67\")   Wt 71 kg (156 lb 8.4 oz)   SpO2 98%   BMI 24.52 kg/m²     General Appearance:   Patient off floor                   :                    Data Review:  All labs (24hrs):   Recent Results (from the past 24 hour(s))   aPTT    Collection Time: 22  2:39 PM    Specimen: Blood   Result Value Ref Range    PTT 51.5 (L) 61.0 - 76.5 seconds   [facility]    Collection " Time: 12/12/22  2:39 PM   Result Value Ref Range    Extra Tube Hold for add-ons.    aPTT    Collection Time: 12/12/22 10:39 PM    Specimen: Blood   Result Value Ref Range    PTT 63.2 61.0 - 76.5 seconds   CBC Auto Differential    Collection Time: 12/12/22 10:39 PM    Specimen: Blood   Result Value Ref Range    WBC 21.90 (H) 3.40 - 10.80 10*3/mm3    RBC 3.14 (L) 4.14 - 5.80 10*6/mm3    Hemoglobin 9.7 (L) 13.0 - 17.7 g/dL    Hematocrit 30.1 (L) 37.5 - 51.0 %    MCV 96.0 79.0 - 97.0 fL    MCH 30.7 26.6 - 33.0 pg    MCHC 32.0 31.5 - 35.7 g/dL    RDW 15.9 (H) 12.3 - 15.4 %    RDW-SD 54.3 (H) 37.0 - 54.0 fl    MPV 7.6 6.0 - 12.0 fL    Platelets 317 140 - 450 10*3/mm3   Scan Slide    Collection Time: 12/12/22 10:39 PM    Specimen: Blood   Result Value Ref Range    Scan Slide     Manual Differential    Collection Time: 12/12/22 10:39 PM    Specimen: Blood   Result Value Ref Range    Neutrophil % 81.0 (H) 42.7 - 76.0 %    Lymphocyte % 5.0 (L) 19.6 - 45.3 %    Monocyte % 2.0 (L) 5.0 - 12.0 %    Bands %  8.0 (H) 0.0 - 5.0 %    Metamyelocyte % 2.0 (H) 0.0 - 0.0 %    Atypical Lymphocyte % 2.0 0.0 - 5.0 %    Neutrophils Absolute 19.49 (H) 1.70 - 7.00 10*3/mm3    Lymphocytes Absolute 1.53 0.70 - 3.10 10*3/mm3    Monocytes Absolute 0.44 0.10 - 0.90 10*3/mm3    [place] Cells Slight/1+ None Seen    Dacrocytes Slight/1+ None Seen    Poikilocytes Slight/1+ None Seen    Polychromasia Slight/1+ None Seen    RBC Fragments Slight/1+ None Seen    WBC Morphology Normal Normal    Platelet Estimate Adequate Normal    Large Platelets Slight/1+ None Seen   aPTT    Collection Time: 12/13/22  4:50 AM    Specimen: Blood   Result Value Ref Range    PTT 76.6 (H) 61.0 - 76.5 seconds   ECG 12 Lead Drug Monitoring; Amiodarone    Collection Time: 12/13/22  5:31 AM   Result Value Ref Range    QT Interval 400 ms   Adult Transesophageal Echo (LIAM) W/ Cont if Necessary Per Protocol (Cardiology Department)    Collection Time: 12/13/22  8:02 AM   Result Value Ref  Range    Target HR (85%) 129 bpm    Max. Pred. HR (100%) 152 bpm    BH CV ECHO SHUNT ASSESSMENT PERFORMED (HIDDEN SCRIPTING) 1       Imaging Results (Last 24 Hours)     Procedure Component Value Units Date/Time    CT Chest Without Contrast Diagnostic [[id number]] Collected: 12/12/22 1628     Updated: 12/12/22 1635    Narrative:         DATE OF EXAM:  12/12/2022 3:42 PM     PROCEDURE:  CT CHEST WO CONTRAST DIAGNOSTIC-     INDICATIONS:   Interstitial lung disease (Ped 1-17y); [id number] for follow-up  examination after completed treatment for conditions other than  malignant neoplasm     COMPARISON:   No Comparisons Available     TECHNIQUE:  Routine transaxial slices were obtained through the chest without the  administration of intravenous contrast. Reconstructed coronal and  sagittal images were also obtained. Automated exposure control and  iterative construction methods were used.     FINDINGS:  There is extensive abnormal lucency seen throughout the lungs most  suggestive of severe bullous changes. The findings are most pronounced  in the upper lobes. Associated bronchiectasis and peribronchial  thickening are seen throughout the lungs. Scattered peripheral areas of  septal thickening with groundglass density and cystic changes are seen  suggesting a component of fibrosis. The extensive lucencies may also  indicate underlying chronic cystic lung disease.     No well-defined consolidations or pleural effusions are seen. Scattered  calcified granulomas are noted. No dominant suspicious nodules or masses  are seen.     No significant hilar, mediastinal, or axillary lymphadenopathy is  observed. Multiple calcified hilar and mediastinal lymph nodes are  noted. A normal aortic arch branching pattern is identified.  Postoperative changes are observed with suggestion of prior cardiac  bypass. The thyroid gland is unremarkable. The esophagus is  unremarkable.     The limited evaluation of the upper abdomen demonstrates  no evidence for  acute abnormality.     No acute osseous abnormalities are observed.       Impression:      1.     No evidence for acute intrathoracic abnormality.  2.     Extensive changes throughout the lungs most consistent with  advanced emphysema/COPD with a component of mild peripheral fibrosis.  Other underlying chronic interstitial lung disease or cystic lung  disease could be considered.  3.     Granulomatous inflammatory changes are noted with scattered  calcified granulomas bilaterally. No dominant suspicious nodule or mass  is seen.     Electronically Signed By-Jenaro Timmons MD On:12/12/2022 4:33 PM  This report was finalized on [id number] by  Jenaro Timmons MD.    US Outside Films [[id number]] Resulted: 12/12/22 0933     Updated: 12/12/22 0933    Narrative:      This procedure was auto-finalized with no dictation required.    CT Outside Films [[id number]] Resulted: 12/12/22 0932     Updated: 12/12/22 0932    Narrative:      This procedure was auto-finalized with no dictation required.    NM Outside Films [[id number]] Resulted: 12/12/22 0932     Updated: 12/12/22 0932    Narrative:      This procedure was auto-finalized with no dictation required.    XR Outside Films [[id number]] Resulted: 12/12/22 0932     Updated: 12/12/22 0932    Narrative:      This procedure was auto-finalized with no dictation required.    XR Outside Films [[id number]] Resulted: 12/12/22 0931     Updated: 12/12/22 0931    Narrative:      This procedure was auto-finalized with no dictation required.           Assessment:    Atrial fibrillation with rapid ventricular response (HCC)    COPD (chronic obstructive pulmonary disease) (HCC)    Primary hypertension    Coronary artery disease involving autologous vein coronary bypass graft without angina pectoris    GERD without esophagitis    Primary osteoarthritis involving multiple joints    Mixed hyperlipidemia    Acute on chronic respiratory failure with hypercapnia  (HCC)      Retention  Abnormal CT concerning for median lobe versus bladder tumor    Plan:    Continue Flomax  Patient off floor this morning, we will follow and plan cystoscopy prior to discharge    Jarrett Lovell MD  [facility]  [facility], [address]  [place] [address]  Office: [phone]  Cell: [phone]  Also available via Epic Secure Chat  12/13/22  08:05 EST

## 2022-12-13 NOTE — ANESTHESIA POSTPROCEDURE EVALUATION
Patient: Fabian Manjarrez    Procedure Summary     Date: 12/13/22 Room / Location: [facility]    Anesthesia Start: 0739 Anesthesia Stop: 0808    Procedures:       ADULT TRANSESOPHAGEAL ECHO (LIAM) W/ CONT IF NECESSARY PER PROTOCOL      CARDIOVERSION EXTERNAL IN CARDIOLOGY DEPARTMENT Diagnosis:       (Arrhythmia)      (Pre-cardioversion)      (aFib)    Scheduled Providers: Shreya Pires MD Provider: Ángel Rodriguez MD    Anesthesia Type: MAC ASA Status: 4          Anesthesia Type: MAC    Vitals  Vitals Value Taken Time   BP 89/60 12/13/22 0814   Temp     Pulse 76 12/13/22 0814   Resp     SpO2 100 % 12/13/22 0814   Vitals shown include unvalidated device data.        Post Anesthesia Care and Evaluation    Patient location during evaluation: PACU  Patient participation: complete - patient participated  Level of consciousness: awake  Pain scale: See nurse's notes for pain score.  Pain management: adequate    Airway patency: patent  Anesthetic complications: No anesthetic complications  PONV Status: none  Cardiovascular status: acceptable  Respiratory status: acceptable and spontaneous ventilation  Hydration status: acceptable    Comments: Patient seen and examined postoperatively; vital signs stable; SpO2 greater than or equal to 90%; cardiopulmonary status stable; nausea/vomiting adequately controlled; pain adequately controlled; no apparent anesthesia complications; patient discharged from anesthesia care when discharge criteria were met

## 2022-12-13 NOTE — PLAN OF CARE
Goal Outcome Evaluation:  Plan of Care Reviewed With: patient        Progress: improving  Outcome Evaluation: 66 yo male adm 12/10/22 for acute hypoxic respiratory failure, a fib w/ rvr. Pt underwent cardioversion earlier today, 12/13. At baseline, pt reports he does not normally use O2 but medical record indicates pt was recenty d/c from another facility, where he was instructed to be on 2L Home O2. Pt lives w/ his wife and is active at baseline. 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 He normally is able to amb community distances, drives, and is independent. Pt and wife have a single level home but plan to stay at daughter's home upon d/c (which is also single level) for increased support in the short term. Pt presents today w/ strength which is WFL. He comes to sit w supervision and to stand w/ cga to sba. He can ambulate 80 ft in room w/ cga, but despite being on 6LO2, pt desats from 93% to 88% w/ slow recovery in seated resting position. Pt needs skilled PT to work on improved endurance. Recommend home w/ family and home health at d/c. Will follow.

## 2022-12-13 NOTE — ANESTHESIA PREPROCEDURE EVALUATION
Group: [facility]         CONSULT NOTE    Patient Identification:  Fabian Manjarrez  67 y.o.  male  1955  [id number]            Requesting physician: Attending physician    Reason for Consultation: Interstitial Lung Disease       History of Present Illness:    Fabian Manjarrez is a 68 y/o male who presents [facility] from [facility] for Atrial Fibrillation and acute on chronic respiratory failure.  He was recently admitted at [facility] from 12/7/2022 to 12/9/2022 for COPD exacerbation, atypical pneumonia, and WOLF related to obstructive uropathy.  When discharged his dyspnea persisted which brought him to ED for further evaluation.      Assessment:    Acute respiratory failure with hypoxia  Chronic Obstructive Pulmonary Disease  -recently discharged from [facility] on 2L oxygen     Interstitial Lung Disease:  [facility] chest ct 8/2022 shows ULP with honeycombing and bronchiectasis   -recently treated with tapering dose of prednisone     Rheumatoid arthritis, treated with Leflunomide (Arava) and chronic prednsione  -followed by Dr Lauren    Atrial Fibrillation with RVR  SSS s/p pacer    12/11/2022 ECHO  EF 40%  RVSP 22.6 mmHg  -mild AV stenosis  Right ventricle severely dilated     Hypertension  GERD    Recommendations:    Chest CT without contrast, ILD    Titrate oxygen, currently requiring 5L per NC    Solumedrol 40 mg q8 hours  Mucinex BID  Bronchodilater  Amiodarone po        Review of Sytems:  Review of Systems   Constitutional: Negative.    HENT: Negative.    Eyes: Negative.    Respiratory: Positive for shortness of breath.    Gastrointestinal: Negative.    Endocrine: Negative.    Genitourinary: Positive for difficulty urinating.        Pt has pop cath   Musculoskeletal: Positive for arthritis.   Skin: Negative.    Allergic/Immunologic: Negative.    Neurological: Negative.    Hematological: Negative.    Psychiatric/Behavioral: Negative.        Past  Medical History:  Past Medical History:   Diagnosis Date   • Arthritis    • COPD (chronic obstructive pulmonary disease) (HCC)    • Coronary artery disease    • Elevated cholesterol    • GERD (gastroesophageal reflux disease)    • History of transfusion    • Hypertension        Past Surgical History:  Past Surgical History:   Procedure Laterality Date   • ABDOMINAL SURGERY     • CARDIAC CATHETERIZATION     • CARDIAC SURGERY     • COLON SURGERY     • HERNIA REPAIR          Home Meds:  (Not in a hospital admission)      Allergies:  Allergies   Allergen Reactions   • Leflunomide Diarrhea   • Methotrexate Diarrhea   • Nitroglycerin Headache   • Nsaids GI Bleeding   • Plaquenil [Hydroxychloroquine] Diarrhea     Weight loss       Social History:   Social History     Socioeconomic History   • Marital status:    Tobacco Use   • Smoking status: Never     Passive exposure: Never   • Smokeless tobacco: Never   Vaping Use   • Vaping Use: Never used   Substance and Sexual Activity   • Drug use: Never   • Sexual activity: Defer       Family History:  No family history on file.    Physical Exam:  There were no vitals taken for this visit. There is no height or weight on file to calculate BMI.      Physical Exam  Vitals reviewed.   Cardiovascular:      Comments: A flutter  Pulmonary:      Effort: Pulmonary effort is normal.      Breath sounds: Rhonchi present.   Abdominal:      General: Bowel sounds are normal.      Palpations: Abdomen is soft.   Skin:     General: Skin is warm and dry.   Neurological:      Mental Status: He is alert and oriented to person, place, and time.         LABS:  Lab Results   Component Value Date    CALCIUM 8.4 (L) 12/12/2022     Results from last 7 days   Lab Units 12/12/22  2239 12/12/22  0457 12/11/22  0042   SODIUM mmol/L  --  134* 135*   POTASSIUM mmol/L  --  4.4 3.9   CHLORIDE mmol/L  --  98 97*   CO2 mmol/L  --  24.0 24.0   BUN mg/dL  --  18 17   CREATININE mg/dL  --  0.90 1.15   GLUCOSE  mg/dL  --  159* 92   CALCIUM mg/dL  --  8.4* 8.2*   WBC 10*3/mm3 21.90* 14.30* 11.60*   HEMOGLOBIN g/dL 9.7* 9.7* 8.7*   PLATELETS 10*3/mm3 317 266 300   ALT (SGPT) U/L  --   --  12   AST (SGOT) U/L  --   --  27   PROBNP pg/mL  --   --  8,457.0*   PROCALCITONIN ng/mL  --   --  0.11     Lab Results   Component Value Date    TROPONINT 0.027 12/12/2022     Results from last 7 days   Lab Units 12/12/22  0457 12/11/22  0752 12/11/22  0042   TROPONIN T ng/mL 0.027 0.044* 0.056*         Results from last 7 days   Lab Units 12/11/22  0042   PROCALCITONIN ng/mL 0.11             Results from last 7 days   Lab Units 12/11/22  0042   INR  1.08         No results found for: TSH  Estimated Creatinine Clearance: 78.9 mL/min (by C-G formula based on SCr of 0.9 mg/dL).         Imaging:  Imaging Results (Last 24 Hours)     ** No results found for the last 24 hours. **            Current Meds:   SCHEDULE    Infusions  No current facility-administered medications for this visit.    Milly Pitts CRNA  12/13/2022  07:13 EST      Much of this encounter note is an electronic transcription/translation of spoken language to printed text using Dragon Software.     Anesthesia Evaluation     Patient summary reviewed and Nursing notes reviewed   no history of anesthetic complications:  NPO Solid Status: > 8 hours  NPO Liquid Status: > 8 hours           Airway   Mallampati: III  Neck ROM: full  No difficulty expected  Dental    (+) edentulous    Pulmonary    (+) COPD, shortness of breath, rhonchi,   Cardiovascular   Exercise tolerance: poor (<4 METS)    ECG reviewed  PT is on anticoagulation therapy  Patient on routine beta blocker  Rhythm: regular    (+) hypertension, valvular problems/murmurs AS, AI, MR and TI, CAD, dysrhythmias Atrial Fib, CHF Systolic <55%, hyperlipidemia,     PE comment: A flutter    Neuro/Psych  GI/Hepatic/Renal/Endo    (+)  GERD,      Musculoskeletal     Abdominal     Abdomen: soft.  Bowel sounds:  normal.   Substance History      OB/GYN          Other   arthritis, blood dyscrasia anemia,     ROS/Med Hx Other: Hyperglycemia, hyponatremia, Afib/RVR, right heart failure, acute-on-chronic respiratory failure with hypoxemia/hypercapnia    Echocardiogram Findings    Left Ventricle Left ventricular systolic function is mildly decreased. Estimated left ventricular EF = 40%   Septal wall motion is abnormal. Systolic and diastolic flattening of the interventricular septum consistent with right ventricle pressure and volume overload. Normal left ventricular cavity size and wall thickness noted. Left ventricular diastolic dysfunction is noted.  Right Ventricle The right ventricular cavity is severely dilated. Severely reduced right ventricular systolic function noted.  Left Atrium The left atrial cavity is mild to moderately dilated.  Right Atrium The right atrial cavity is mild to moderately dilated.  The inferior vena cava is dilated. IVC inspiratory collapse is absent.  Aortic Valve The aortic valve is abnormal in structure. There is moderate calcification of the aortic valve mainly affecting the non-coronary and left coronary cusp(s). The aortic valve appears trileaflet. Mild aortic valve regurgitation is present. Mild aortic valve stenosis is present.  Mitral Valve The mitral valve is grossly normal in structure. Mild mitral valve regurgitation is present. No significant mitral valve stenosis is present.  Tricuspid Valve Mild tricuspid valve regurgitation is present. Insufficient TR velocity profile to estimate the right ventricular systolic pressure. No evidence of significant tricuspid valve stenosis is present.  Pulmonic Valve The pulmonic valve is not well visualized. There is mild pulmonic valve regurgitation present. There is no pulmonic valve stenosis present.  Greater Vessels No dilation of the aortic root is present.  Pericardium The pericardium is normal. There is no evidence of pericardial effusion.  .    PS  ABDOMINAL SURGERY CARDIAC CATHETERIZATION  COLON SURGERY CARDIAC SURGERY  HERNIA REPAIR                   Anesthesia Plan    ASA 4     MAC     (Patient identified; pre-operative vital signs, all relevant labs/studies, complete medical/surgical/anesthetic history, full medication list, full allergy list, and NPO status obtained/reviewed; physical assessment performed; anesthetic options, side effects, potential complications, risks, and benefits discussed; questions answered; written anesthesia consent obtained; patient cleared for procedure; anesthesia machine and equipment checked and functioning)  intravenous induction     Anesthetic plan, risks, benefits, and alternatives have been provided, discussed and informed consent has been obtained with: patient.    Plan discussed with CRNA.

## 2022-12-13 NOTE — PROGRESS NOTES
[facility] Medicine Services Daily Progress Note    Patient Name: Fabian Manjarrez  : 1955  MRN: [id number]  Primary Care Physician:  Addison Burdick MD  Date of admission: 12/10/2022      Subjective      Chief Complaint: Shortness of breath.      Patient Reports:        2022.  Patient was seen and examined.  Patient reported no new symptoms.  Patient requested a diet.      Review of Systems   Constitutional: Negative.   HENT: Negative.    Eyes: Negative.    Cardiovascular: Positive for palpitations.   Respiratory: Positive for shortness of breath.    Endocrine: Negative.    Hematologic/Lymphatic: Negative.    Skin: Negative.    Musculoskeletal: Negative.    Gastrointestinal: Negative.    Genitourinary: Negative.    Neurological: Negative.    Psychiatric/Behavioral: Negative.    Allergic/Immunologic: Negative.             Objective      Vitals:   Temp:  [97.4 °F (36.3 °C)-98.5 °F (36.9 °C)] 97.4 °F (36.3 °C)  Heart Rate:  [118-131] 121  Resp:  [13-18] 18  BP: ()/(55-82) 114/69  Flow (L/min):  [4-5] 4    Physical Exam  Vitals reviewed.   HENT:      Head: Normocephalic.      Nose: Nose normal.      Mouth/Throat:      Mouth: Mucous membranes are dry.      Pharynx: Oropharynx is clear.   Eyes:      Extraocular Movements: Extraocular movements intact.      Conjunctiva/sclera: Conjunctivae normal.      Pupils: Pupils are equal, round, and reactive to light.   Cardiovascular:      Rate and Rhythm: Tachycardia present. Rhythm irregular.      Pulses: Normal pulses.      Heart sounds: No murmur heard.    No friction rub. No gallop.      Comments: S1 and S2 present.  Irregularly irregular.  Positive tachycardia.  Pulmonary:      Effort: Pulmonary effort is normal.      Breath sounds: No stridor. No wheezing or rales.   Chest:      Chest wall: No tenderness.   Abdominal:      General: Bowel sounds are normal. There is no distension.      Palpations: Abdomen is soft. There is no  mass.      Tenderness: There is no abdominal tenderness. There is no right CVA tenderness, left CVA tenderness, guarding or rebound.      Hernia: No hernia is present.   Musculoskeletal:         General: No swelling, tenderness, deformity or signs of injury.      Cervical back: Normal range of motion. No rigidity.      Right lower leg: No edema.      Left lower leg: No edema.   Skin:     General: Skin is warm and dry.      Capillary Refill: Capillary refill takes less than 2 seconds.      Coloration: Skin is not jaundiced.      Findings: No bruising, erythema, lesion or rash.   Neurological:      Comments: No facial asymmetry noted.  Gait and station not tested.   Psychiatric:      Comments: No agitation.               Result Review    Result Review:  I have personally reviewed the results from the time of this admission to 12/12/2022 19:27 EST and agree with these findings:  []  Laboratory  []  Microbiology  []  Radiology  []  EKG/Telemetry   []  Cardiology/Vascular   []  Pathology  []  Old records  []  Other:  Most notable findings include:          Assessment & Plan    From previous notes and with minor updates.    Brief Patient Summary:    Patient is a 67 y.o. male with past medical history of hypertension, hyperlipidemia, osteoarthritis, COPD, chronic CAD  and GERD who presented to [facility] on 12/10/2022 complaining of above.   Notified of transfer arrival to PCU unit by nursing staff.  Patient was accepted for transfer from [facility] ED earlier today by Dr. Avelar for A. fib with RVR and acute on chronic hypoxic respiratory failure with possible pulmonary edema.  History is somewhat challenging though he is alert oriented x3 and able to provide history he sometimes gets lost in the information that he provides and what timing of events.  He has extensive past medical history-see assessment/plan for details.  Per available outside records the following was found.  He was admitted to  [facility] between 12/7/2022 through 12/9/2022 and managed for acute COPD exacerbation, possible atypical pneumonia, and WOLF related to obstructive uropathy.  He was not on supplemental oxygen prior to this time though was discharged with 2 L nasal cannula and a course of empiric antibiotics.  VQ scan during that hospitalization was low probability of PE.  He had mild WOLF with a creatinine of 2. This improved to discharge creatinine of 1.4 with medical management and after pop cathether placement for obstructive uropathy.  He was able to void after the Pop catheter was removed and he was discharged with Flomax with recommendations to follow-up with urology due to questionable filling defect related to the prostate versus bladder mass measuring 1.5 cm in size.  Outpatient follow-up with urology was recommended  He went home and over the course of 24 hours still had persistent shortness of breath which prompted his presentation again to the emergency room.  At this time he was found to be in A. fib with RVR and was initiated on amiodarone and given IV Lasix 40 mg x 1. Chest x-ray revealed stable diffuse interstitial changes bilaterally with stable patchy multifocal airspace infiltrates. His white blood cell count was 20 hemoglobin was 9.7 platelets were 352.  Sodium was 135 creatinine was 1.2.  Albumin was 3.  BNP was 1095. Troponin I was 0.07.  Influenza panel COVID-19 and RSV were negative.  Transfer to tertiary facility was requested.     On arrival to our facility he is on 6 L nasal cannula.  He denies any active chest pain though reports 8 out of 10 headache that is more right-sided with some sensitivity to light.  He describes a history of migraine headaches every few weeks and this is similar.  No focal deficits are noted.  Mild nausea as described.  She denies sinus tenderness.  No rhinorrhea.  Endorses generalized myalgias.  Denies hemoptysis.  Denies travel history.  No known sick contacts.   Cardiology initiated heparin drip on arrival.         amiodarone, 200 mg, Oral, Q12H  aspirin, 81 mg, Oral, Daily  buPROPion SR, 100 mg, Oral, Daily  ferrous sulfate, 324 mg, Oral, Daily With Breakfast  FLUoxetine, 40 mg, Oral, Daily  guaiFENesin, 600 mg, Oral, Daily  ipratropium-albuterol, 3 mL, Nebulization, 4x Daily - RT  methylPREDNISolone sodium succinate, 40 mg, Intravenous, Q12H  pantoprazole, 40 mg, Oral, BID  pregabalin, 75 mg, Oral, BID  ranolazine, 1,000 mg, Oral, BID  rosuvastatin, 20 mg, Oral, Nightly  sodium chloride, 10 mL, Intravenous, Q12H  tamsulosin, 0.4 mg, Oral, Daily       heparin, 12 Units/kg/hr, Last Rate: 19 Units/kg/hr (12/12/22 1522)         Active Hospital Problems:  Active Hospital Problems    Diagnosis    • **Atrial fibrillation with rapid ventricular response (HCC)    • Acute on chronic respiratory failure with hypercapnia (HCC)    • COPD (chronic obstructive pulmonary disease) (HCC)    • Primary hypertension    • Coronary artery disease involving autologous vein coronary bypass graft without angina pectoris    • GERD without esophagitis    • Primary osteoarthritis involving multiple joints    • Mixed hyperlipidemia          Plan:      -Continue appropriate patient's home medications for other chronic medical conditions.  -Continue the present level of care.  -Patient and family agreed with the plan of care.  -Treat acute on chronic respiratory failure with oxygen therapy.  -Follow pulmonary recommendations.  -Treat atrial fibrillation with RVR with a rate control strategy.  -Follow cardiology recommendations.  -Treat GERD with Protonix.  -Treated primary osteoarthritis with Tylenol.        DVT prophylaxis:  Medical and mechanical DVT prophylaxis orders are present.    CODE STATUS:    Code Status (Patient has no pulse and is not breathing): CPR (Attempt to Resuscitate)  Medical Interventions (Patient has pulse or is breathing): Full Support  Release to patient: Routine  Release      Disposition: This wonderful patient can discharge in the next 48 to 72 hours.    This patient has been examined wearing appropriate Personal Protective Equipment and discussed with hospital infection control department, [facility] department, infectious disease specialist and pulmonologist. 12/12/22      Electronically signed by Delvin Da Silva MD, FACP, 12/12/22, 19:27 EST.      [facility]

## 2022-12-13 NOTE — THERAPY EVALUATION
Patient Name: Fabian Manjarrez  : 1955    MRN: [id number]                              Today's Date: 2022       Admit Date: 12/10/2022    Visit Dx:     ICD-10-CM ICD-9-CM   1. Atrial fibrillation with rapid ventricular response (HCC)  I48.91 427.31   2. Follow-up exam  Z09 V67.9     Patient Active Problem List   Diagnosis   • Atrial fibrillation with rapid ventricular response (HCC)   • COPD (chronic obstructive pulmonary disease) (HCC)   • Primary hypertension   • Coronary artery disease involving autologous vein coronary bypass graft without angina pectoris   • GERD without esophagitis   • Primary osteoarthritis involving multiple joints   • Mixed hyperlipidemia   • Acute on chronic respiratory failure with hypercapnia (HCC)     Past Medical History:   Diagnosis Date   • Arthritis    • COPD (chronic obstructive pulmonary disease) (HCC)    • Coronary artery disease    • Elevated cholesterol    • GERD (gastroesophageal reflux disease)    • History of transfusion    • Hypertension      Past Surgical History:   Procedure Laterality Date   • ABDOMINAL SURGERY     • CARDIAC CATHETERIZATION     • CARDIAC SURGERY     • COLON SURGERY     • HERNIA REPAIR        General Information     Row Name 22 1612          Physical Therapy Time and Intention    Document Type evaluation  -CM     Mode of Treatment physical therapy  -CM     Row Name 22 161          General Information    Patient Profile Reviewed yes  -CM     Prior Level of Function independent:;community mobility;gait;driving  retired; uses cane in R hand sometimes to manage RA pain in LLE; no home O2  -CM     Existing Precautions/Restrictions oxygen therapy device and L/min;fall;orthostatic hypotension  reports hx of orthostatic hypotension, but not witnessed this date  -CM     Barriers to Rehab medically complex  -CM     Row Name 22 161          Living Environment    People in Home spouse;child(erasmo), adult  lives w/ wife, but plans to stay  at daughter's home (along w/ wife) at d/c for increased safety  -CM     Row Name 12/13/22 1612          Home Main Entrance    Number of Stairs, Main Entrance none  -CM     Row Name 12/13/22 1612          Stairs Within Home, Primary    Number of Stairs, Within Home, Primary none  -CM     Row Name 12/13/22 1612          Cognition    Orientation Status (Cognition) oriented x 4  -CM     Row Name 12/13/22 1612          Safety Issues, Functional Mobility    Impairments Affecting Function (Mobility) shortness of breath;endurance/activity tolerance  -CM           User Key  (r) = Recorded By, (t) = Taken By, (c) = Cosigned By    Initials Name Provider Type    Radha Thrasher, PT Physical Therapist               Mobility     Row Name 12/13/22 1614          Bed Mobility    Bed Mobility supine-sit  -CM     Supine-Sit [place] (Bed Mobility) supervision  -CM     Assistive Device (Bed Mobility) head of bed elevated  -CM     Row Name 12/13/22 1614          Sit-Stand Transfer    Sit-Stand [place] (Transfers) contact guard  -CM     Row Name 12/13/22 1614          Gait/Stairs (Locomotion)    [place] Level (Gait) contact guard  -CM     Assistive Device (Gait) other (see comments)  one hand held, gait belt, non skid socks  -CM     Distance in Feet (Gait) 80 ft in room; no significant gait deviations. Normal pace. However, pt soa after amb.  -CM           User Key  (r) = Recorded By, (t) = Taken By, (c) = Cosigned By    Initials Name Provider Type    Radha Thrasher, PT Physical Therapist               Obj/Interventions     Row Name 12/13/22 1615          Range of Motion Comprehensive    General Range of Motion bilateral lower extremity ROM WFL;bilateral upper extremity ROM WFL  -CM     Row Name 12/13/22 1615          Strength Comprehensive (MMT)    General Manual Muscle Testing (MMT) Assessment no strength deficits identified  -CM     Row Name 12/13/22 1615          Balance    Balance Assessment sitting static  balance;sitting dynamic balance;standing static balance;standing dynamic balance  -CM     Static Sitting Balance independent  -CM     Dynamic Sitting Balance independent  -CM     Position, Sitting Balance unsupported;sitting edge of bed  -CM     Static Standing Balance supervision  -CM     Dynamic Standing Balance contact guard  -CM     Position/Device Used, Standing Balance supported  -CM     Row Name 12/13/22 1615          Sensory Assessment (Somatosensory)    Sensory Assessment (Somatosensory) sensation intact  -CM           User Key  (r) = Recorded By, (t) = Taken By, (c) = Cosigned By    Initials Name Provider Type    CM Radha Valdes, PT Physical Therapist               Goals/Plan     Row Name 12/13/22 1623          Transfer Goal 1 (PT)    Activity/Assistive Device (Transfer Goal 1, PT) transfers, all  -CM     [place] Level/Cues Needed (Transfer Goal 1, PT) independent  -CM     Time Frame (Transfer Goal 1, PT) 2 weeks  -CM     Strategies/Barriers (Transfers Goal 1, PT) no loss of balance or soa  -CM     Row Name 12/13/22 1623          Gait Training Goal 1 (PT)    Activity/Assistive Device (Gait Training Goal 1, PT) gait (walking locomotion)  -CM     [place] Level (Gait Training Goal 1, PT) independent  -CM     Distance (Gait Training Goal 1, PT) 300 ft w/o soa or loss of balance  -CM     Time Frame (Gait Training Goal 1, PT) 2 weeks  -CM     Row Name 12/13/22 1623          Therapy Assessment/Plan (PT)    Planned Therapy Interventions (PT) balance training;bed mobility training;gait training;home exercise program;postural re-education;transfer training;patient/family education;strengthening;ROM (range of motion)  -CM           User Key  (r) = Recorded By, (t) = Taken By, (c) = Cosigned By    Initials Name Provider Type    Radha Thrasher, PT Physical Therapist               Clinical Impression     Row Name 12/13/22 1615          Pain    Pretreatment Pain Rating 0/10 - no pain  -CM      Posttreatment Pain Rating 0/10 - no pain  -CM     Pain Intervention(s) Ambulation/increased activity;Repositioned;Emotional support  -CM     Row Name 12/13/22 1615          Plan of Care Review    Plan of Care Reviewed With patient  -CM     Progress improving  -CM     Outcome Evaluation 68 yo male adm 12/10/22 for acute hypoxic respiratory failure, a fib w/ rvr. Pt underwent cardioversion earlier today, 12/13. At baseline, pt reports he does not normally use O2 but medical record indicates pt was recenty d/c from another facility, where he was instructed to be on 2L Home O2. Pt lives w/ his wife and is active at baseline. 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 He normally is able to amb community distances, drives, and is independent. Pt and wife have a single level home but plan to stay at daughter's home upon d/c (which is also single level) for increased support in the short term. Pt presents today w/ strength which is WFL. He comes to sit w supervision and to stand w/ cga to sba. He can ambulate 80 ft in room w/ cga, but despite being on 6LO2, pt desats from 93% to 88% w/ slow recovery in seated resting position. Pt needs skilled PT to work on improved endurance. Recommend home w/ family and home health at d/c. Will follow.  -CM     Row Name 12/13/22 1621          Therapy Assessment/Plan (PT)    Patient/Family Therapy Goals Statement (PT) agreeable to plan as stated  -CM     Rehab Potential (PT) good, to achieve stated therapy goals  -CM     Criteria for Skilled Interventions Met (PT) yes;meets criteria;skilled treatment is necessary  -CM     Therapy Frequency (PT) 3 times/wk  -CM     Predicted Duration of Therapy Intervention (PT) until d/c  -CM     Row Name 12/13/22 1621          Vital Signs    Pre Systolic BP Rehab 110  -CM     Pre Treatment Diastolic BP 74  -CM     Intra Systolic BP Rehab 97  -CM     Intra Treatment Diastolic BP 69  -CM     Post Systolic BP Rehab 101  -CM     Post Treatment Diastolic BP 72  -CM      Pretreatment Heart Rate (beats/min) 89  -CM     Posttreatment Heart Rate (beats/min) 89  -CM     Pretreatment Resp Rate (breaths/min) 20  -CM     Pre SpO2 (%) 94  -CM     O2 Delivery Pre Treatment supplemental O2  5L  -CM     Intra SpO2 (%) 88  -CM     O2 Delivery Intra Treatment supplemental O2  6L  -CM     Post SpO2 (%) 94  -CM     O2 Delivery Post Treatment supplemental O2  5L  -CM     Row Name 12/13/22 1621          Positioning and Restraints    Pre-Treatment Position in bed  -CM     Post Treatment Position chair  -CM     In Chair notified nsg;sitting;call light within reach;encouraged to call for assist;exit alarm on  -CM           User Key  (r) = Recorded By, (t) = Taken By, (c) = Cosigned By    Initials Name Provider Type    Radha Thrasher, PT Physical Therapist               Outcome Measures     Row Name 12/13/22 1624          How much help from another person do you currently need...    Turning from your back to your side while in flat bed without using bedrails? 4  -CM     Moving from lying on back to sitting on the side of a flat bed without bedrails? 4  -CM     Moving to and from a bed to a chair (including a wheelchair)? 4  -CM     Standing up from a chair using your arms (e.g., wheelchair, bedside chair)? 4  -CM     Climbing 3-5 steps with a railing? 3  -CM     To walk in hospital room? 3  -CM     AM-PAC 6 Clicks Score (PT) 22  -CM     Highest level of mobility 7 --> Walked 25 feet or more  -CM           User Key  (r) = Recorded By, (t) = Taken By, (c) = Cosigned By    Initials Name Provider Type    Radha Thrasher, PT Physical Therapist                             Physical Therapy Education     Title: PT OT SLP Therapies (Done)     Topic: Physical Therapy (Done)     Point: Mobility training (Done)     Learning Progress Summary           Patient Acceptance, E,TB, VU,DU by MARTIN at 12/13/2022 1624                               User Key     Initials Effective Dates Name Provider Type  Discipline    CM 06/16/21 -  Radha Valdes, PT Physical Therapist PT              PT Recommendation and Plan  Planned Therapy Interventions (PT): balance training, bed mobility training, gait training, home exercise program, postural re-education, transfer training, patient/family education, strengthening, ROM (range of motion)  Plan of Care Reviewed With: patient  Progress: improving  Outcome Evaluation: 66 yo male adm 12/10/22 for acute hypoxic respiratory failure, a fib w/ rvr. Pt underwent cardioversion earlier today, 12/13. At baseline, pt reports he does not normally use O2 but medical record indicates pt was recenty d/c from another facility, where he was instructed to be on 2L Home O2. Pt lives w/ his wife and is active at baseline. 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 He normally is able to amb community distances, drives, and is independent. Pt and wife have a single level home but plan to stay at daughter's home upon d/c (which is also single level) for increased support in the short term. Pt presents today w/ strength which is WFL. He comes to sit w supervision and to stand w/ cga to sba. He can ambulate 80 ft in room w/ cga, but despite being on 6LO2, pt desats from 93% to 88% w/ slow recovery in seated resting position. Pt needs skilled PT to work on improved endurance. Recommend home w/ family and home health at d/c. Will follow.     Time Calculation:    PT Charges     Row Name 12/13/22 1625             Time Calculation    Start Time 1536  -CM      Stop Time 1601  -CM      Time Calculation (min) 25 min  -CM      PT Received On 12/13/22  -CM      PT - Next Appointment 12/15/22  -CM      PT Goal Re-Cert Due Date 12/27/22  -CM         Time Calculation- PT    Total Timed Code Minutes- PT 0 minute(s)  -CM            User Key  (r) = Recorded By, (t) = Taken By, (c) = Cosigned By    Initials Name Provider Type    Radha Thrasher, PT Physical Therapist              Therapy Charges for Today     Code  Description Service Date Service Provider Modifiers Qty    63572027350 HC PT EVAL MOD COMPLEXITY 4 12/13/2022 Radha Valdes, PT GP 1          PT G-Codes  AM-PAC 6 Clicks Score (PT): 22  PT Discharge Summary  Anticipated Discharge Disposition (PT): home with assist, home with home health    Radha Valdes, PT  12/13/2022

## 2022-12-14 NOTE — PROGRESS NOTES
Daily Progress Note        Atrial fibrillation with rapid ventricular response (HCC)    COPD (chronic obstructive pulmonary disease) (HCC)    Primary hypertension    Coronary artery disease involving autologous vein coronary bypass graft without angina pectoris    GERD without esophagitis    Primary osteoarthritis involving multiple joints    Mixed hyperlipidemia    Acute on chronic respiratory failure with hypercapnia (HCC)      Assessment    Acute respiratory failure with hypoxia  Chronic Obstructive Pulmonary Disease  -recently discharged from [facility] on 2L oxygen      Interstitial Lung Disease:  [facility] chest ct 8/2022 shows ULP with honeycombing and bronchiectasis   -recently treated with tapering dose of prednisone      Rheumatoid arthritis, treated with Leflunomide (Arava) and chronic prednsione  -followed by Dr Lauren     Atrial Fibrillation with RVR  -Status post cardioversion 12/13/2022  SSS s/p pacer     12/11/2022 ECHO  EF 40%  RVSP 22.6 mmHg  -mild AV stenosis  -Right ventricle severely dilated      Hypertension  GERD     Recommendations:    Respiratory culture    Bactrim DS 1 PO BID  Discontinue IV steroids and start prednisone 40 mg daily  Patient is immunocompromised on chronic prednisone for RA    Titrate oxygen, currently requiring 5L per NC     Mucinex BID  Bronchodilater  Amiodarone po          LOS: 4 days     Subjective         Objective     Vital signs for last 24 hours:  Vitals:    12/13/22 1904 12/13/22 2126 12/14/22 0134 12/14/22 0514   BP:  130/73 120/65 125/67   BP Location:  Right arm Right arm Right arm   Patient Position:  Lying Lying Lying   Pulse: 70 78 76 74   Resp: 14 18 18 16   Temp:  97.9 °F (36.6 °C) 97.6 °F (36.4 °C) 97.4 °F (36.3 °C)   TempSrc:  Oral Oral    SpO2: 100% 97%  97%   Weight:    71.6 kg (157 lb 13.6 oz)   Height:           Intake/Output last 3 shifts:  I/O last 3 completed shifts:  In: 1151 [P.O.:720; I.V.:431]  Out: 2350 [Urine:2350]  Intake/Output this  shift:  I/O this shift:  In: -   Out: 2200 [Urine:2200]      Radiology  Imaging Results (Last 24 Hours)     ** No results found for the last 24 hours. **          Labs:  Results from last 7 days   Lab Units 12/12/22  2239   WBC 10*3/mm3 21.90*   HEMOGLOBIN g/dL 9.7*   HEMATOCRIT % 30.1*   PLATELETS 10*3/mm3 317     Results from last 7 days   Lab Units 12/12/22  0457 12/11/22  0042   SODIUM mmol/L 134* 135*   POTASSIUM mmol/L 4.4 3.9   CHLORIDE mmol/L 98 97*   CO2 mmol/L 24.0 24.0   BUN mg/dL 18 17   CREATININE mg/dL 0.90 1.15   CALCIUM mg/dL 8.4* 8.2*   BILIRUBIN mg/dL  --  0.4   ALK PHOS U/L  --  174*   ALT (SGPT) U/L  --  12   AST (SGOT) U/L  --  27   GLUCOSE mg/dL 159* 92         Results from last 7 days   Lab Units 12/11/22  0042   ALBUMIN g/dL 2.90*     Results from last 7 days   Lab Units 12/12/22  0457 12/11/22  0752 12/11/22  0042   TROPONIN T ng/mL 0.027 0.044* 0.056*             Results from last 7 days   Lab Units 12/14/22  0027 12/13/22  1210 12/13/22  0450 12/11/22  0550 12/11/22  0042   INR   --   --   --   --  1.08   APTT seconds 26.2* 35.2* 76.6*   < > 25.9*    < > = values in this interval not displayed.               Meds:   SCHEDULE  amiodarone, 200 mg, Oral, Q12H  aspirin, 81 mg, Oral, Daily  buPROPion SR, 100 mg, Oral, Daily  ferrous sulfate, 324 mg, Oral, Daily With Breakfast  FLUoxetine, 40 mg, Oral, Daily  guaiFENesin, 600 mg, Oral, Daily  ipratropium-albuterol, 3 mL, Nebulization, 4x Daily - RT  methylPREDNISolone sodium succinate, 40 mg, Intravenous, Q12H  pantoprazole, 40 mg, Oral, BID  pregabalin, 75 mg, Oral, BID  ranolazine, 1,000 mg, Oral, BID  rivaroxaban, 20 mg, Oral, Daily With Dinner  rosuvastatin, 20 mg, Oral, Nightly  sodium chloride, 10 mL, Intravenous, Q12H  sulfamethoxazole-trimethoprim, 1 tablet, Oral, Q12H  tamsulosin, 0.4 mg, Oral, Daily  vitamin B-12, 1,000 mcg, Oral, Daily      Infusions  sodium chloride, 30 mL/hr, Last Rate: Stopped (12/13/22 0815)      PRNs  •   acetaminophen  •  albuterol  •  docusate sodium  •  HYDROcodone-acetaminophen  •  Morphine  •  ondansetron  •  sodium chloride  •  sodium chloride    Physical Exam:  Physical Exam  Vitals reviewed.   Pulmonary:      Effort: Pulmonary effort is normal.      Breath sounds: Rhonchi present.   Skin:     General: Skin is warm and dry.   Neurological:      Mental Status: He is alert and oriented to person, place, and time.         ROS  Review of Systems   Respiratory: Positive for cough and shortness of breath.        I have reviewed the patient's new clinical results.    Electronically signed by Vera Marlow MD

## 2022-12-14 NOTE — PROGRESS NOTES
"  [facility] DAILY PROGRESS NOTE    Patient Identification  Name: Fabian Manjarrez  Age: 67 y.o.  Sex: male  :  1955  MRN: [id number]    Date: 2022             Subjective:  Interval History: Voiding trial today, unable to void all day and residual greater than 500 ready for cystoscopy    Objective:    Scheduled Meds:amiodarone, 200 mg, Oral, Q12H  aspirin, 81 mg, Oral, Daily  buPROPion SR, 100 mg, Oral, Daily  ferrous sulfate, 324 mg, Oral, Daily With Breakfast  FLUoxetine, 40 mg, Oral, Daily  guaiFENesin, 600 mg, Oral, Daily  ipratropium-albuterol, 3 mL, Nebulization, 4x Daily - RT  pantoprazole, 40 mg, Oral, BID  [START ON 12/15/2022] predniSONE, 40 mg, Oral, Daily With Breakfast  pregabalin, 75 mg, Oral, BID  ranolazine, 1,000 mg, Oral, BID  rivaroxaban, 20 mg, Oral, Daily With Dinner  rosuvastatin, 20 mg, Oral, Nightly  sodium chloride, 10 mL, Intravenous, Q12H  sulfamethoxazole-trimethoprim, 1 tablet, Oral, Q12H  tamsulosin, 0.4 mg, Oral, Daily  vitamin B-12, 1,000 mcg, Oral, Daily      Continuous Infusions:sodium chloride, 30 mL/hr, Last Rate: Stopped (22 0815)      PRN Meds:•  acetaminophen  •  albuterol  •  docusate sodium  •  HYDROcodone-acetaminophen  •  Morphine  •  ondansetron  •  sodium chloride  •  sodium chloride    Vital signs in last 24 hours:  Temp:  [97.4 °F (36.3 °C)-97.9 °F (36.6 °C)] 97.8 °F (36.6 °C)  Heart Rate:  [70-78] 73  Resp:  [12-23] 23  BP: (115-137)/(55-73) 115/67    Intake/Output:    Intake/Output Summary (Last 24 hours) at 2022 1410  Last data filed at 2022 0900  Gross per 24 hour   Intake 120 ml   Output 2200 ml   Net -2080 ml       Exam:  /67 (BP Location: Right arm, Patient Position: Lying)   Pulse 73   Temp 97.8 °F (36.6 °C) (Oral)   Resp 23   Ht 170.2 cm (67\")   Wt 71.6 kg (157 lb 13.6 oz)   SpO2 96%   BMI 24.72 kg/m²     General Appearance:   No acute distress                   :   No suprapubic distention            Bedside " Cystoscopy  The plan was discussed with the patient and/or family who agreed and consented to bedside cystoscopy as described.  Prior to initiating the procedure a timeout was performed patient was identified using 2 identifiers and procedure was described, all in the room were in agreement.  The patient was prepped and draped in the usual fashion.  Flexible cystourethroscopy was performed which revealed a patent urethra with no lesions.  Cystoscopy was performed which revealed 2+ obstructing prostate short to moderate fossa, no bladder tumors, 2+ bladder trabeculations.  Sanford replaced due to significant residual.  He is good candidate for UroLift  Patient tolerated the procedure well with no complications      Data Review:  All labs (24hrs):   Recent Results (from the past 24 hour(s))   aPTT    Collection Time: 12/14/22 12:27 AM    Specimen: Blood   Result Value Ref Range    PTT 26.2 (L) 61.0 - 76.5 seconds      Imaging Results (Last 24 Hours)     ** No results found for the last 24 hours. **           Assessment:    Atrial fibrillation with rapid ventricular response (HCC)    COPD (chronic obstructive pulmonary disease) (HCC)    Primary hypertension    Coronary artery disease involving autologous vein coronary bypass graft without angina pectoris    GERD without esophagitis    Primary osteoarthritis involving multiple joints    Mixed hyperlipidemia    Acute on chronic respiratory failure with hypercapnia (HCC)      Retention  Abnormal CT concerning for median lobe versus bladder tumor    Plan:    Continue Flomax  No findings of bladder tumor, no median lobe, Sanford replaced due to recurrent retention may have another voiding trial prior to discharge  Follow-up in the office to plan UroLift    Jarrett Lovell MD  [facility]  [facility], [address]  [place] [address]  Office: [phone]  Cell: [phone]  Also available via Epic Secure Chat  12/14/22  14:10 EST

## 2022-12-14 NOTE — PLAN OF CARE
Problem: Adult Inpatient Plan of Care  Goal: Plan of Care Review  Outcome: Ongoing, Progressing  Flowsheets (Taken 12/14/2022 1332)  Outcome Evaluation: Sanford removed this am by nightshift but patient still unable to void. Bladder scan completed. Secure chat sent to urology awaiting orders. Patient to have cysto at bedside today and mildly anxious about it. All questions answered and no new complaints per patient.  Goal: Patient-Specific Goal (Individualized)  Outcome: Ongoing, Progressing  Goal: Absence of Hospital-Acquired Illness or Injury  Outcome: Ongoing, Progressing  Intervention: Identify and Manage Fall Risk  Recent Flowsheet Documentation  Taken 12/14/2022 1201 by Linda James, RN  Safety Promotion/Fall Prevention:   nonskid shoes/slippers when out of bed   room organization consistent   safety round/check completed  Taken 12/14/2022 0823 by Linda James, RN  Safety Promotion/Fall Prevention:   nonskid shoes/slippers when out of bed   room organization consistent   safety round/check completed  Goal: Optimal Comfort and Wellbeing  Outcome: Ongoing, Progressing  Intervention: Provide Person-Centered Care  Recent Flowsheet Documentation  Taken 12/14/2022 0823 by Linda James, RN  Trust Relationship/Rapport:   care explained   choices provided  Goal: Readiness for Transition of Care  Outcome: Ongoing, Progressing   Goal Outcome Evaluation:              Outcome Evaluation: Sanford removed this am by nightshift but patient still unable to void. Bladder scan completed. Secure chat sent to urology awaiting orders. Patient to have cysto at bedside today and mildly anxious about it. All questions answered and no new complaints per patient.

## 2022-12-14 NOTE — PROGRESS NOTES
[facility] Emergency Room Medicine Services Daily Progress Note    Patient Name: Fabian Manjarrez  : 1955  MRN: [id number]  Primary Care Physician:  Addison Burdick MD  Date of admission: 12/10/2022      Subjective      Chief Complaint: Shortness of breath.      Patient Reports:        2022.  Patient was seen and examined.  Patient reported no new symptoms.  Patient requested a diet.      2022.  Patient was seen and examined.  Patient reported significant improvement in his symptoms.  Patient is status post cardioversion.      2022.  Patient was seen and examined.  Patient requested a diet similar to his his diet at home.  Patient reported no overnight events.      Review of Systems   Constitutional: Positive for malaise/fatigue.   HENT: Negative.    Eyes: Negative.    Cardiovascular: Negative for palpitations.   Respiratory: Negative for shortness of breath.    Endocrine: Negative.    Hematologic/Lymphatic: Negative.    Skin: Negative.    Musculoskeletal: Negative.    Gastrointestinal: Negative.    Genitourinary: Negative.    Neurological: Positive for weakness.   Psychiatric/Behavioral: Negative.    Allergic/Immunologic: Negative.             Objective      Vitals:   Temp:  [97.4 °F (36.3 °C)-97.9 °F (36.6 °C)] 97.6 °F (36.4 °C)  Heart Rate:  [70-78] 73  Resp:  [12-23] 23  BP: (115-137)/(55-73) 115/67  Flow (L/min):  [3-5] 3    Physical Exam  Vitals reviewed.   Constitutional:       Comments: Patient is lying comfortably in bed and in no acute distress.  Family is at the bedside.   HENT:      Head: Normocephalic.      Nose: Nose normal.      Mouth/Throat:      Mouth: Mucous membranes are dry.      Pharynx: Oropharynx is clear.   Eyes:      Extraocular Movements: Extraocular movements intact.      Conjunctiva/sclera: Conjunctivae normal.      Pupils: Pupils are equal, round, and reactive to light.   Cardiovascular:      Pulses: Normal pulses.      Heart sounds: No murmur  heard.    No friction rub. No gallop.      Comments: S1 and S2 present.  No tachycardia.  Pulmonary:      Effort: Pulmonary effort is normal.      Breath sounds: No stridor. No wheezing or rales.   Chest:      Chest wall: No tenderness.   Abdominal:      General: Bowel sounds are normal. There is no distension.      Palpations: Abdomen is soft. There is no mass.      Tenderness: There is no abdominal tenderness. There is no right CVA tenderness, left CVA tenderness, guarding or rebound.      Hernia: No hernia is present.   Musculoskeletal:         General: No swelling, tenderness, deformity or signs of injury.      Cervical back: Normal range of motion. No rigidity.      Right lower leg: No edema.      Left lower leg: No edema.   Skin:     General: Skin is warm and dry.      Capillary Refill: Capillary refill takes less than 2 seconds.      Coloration: Skin is not jaundiced.      Findings: No bruising, erythema, lesion or rash.   Neurological:      Comments: No facial asymmetry noted.  Gait and station not tested.   Psychiatric:      Comments: No agitation.               Result Review    Result Review:  I have personally reviewed the results from the time of this admission to 12/14/2022 14:46 EST and agree with these findings:  []  Laboratory  []  Microbiology  []  Radiology  []  EKG/Telemetry   []  Cardiology/Vascular   []  Pathology  []  Old records  []  Other:  Most notable findings include:          Assessment & Plan    From previous notes and with minor updates.    Brief Patient Summary:    Patient is a 67 y.o. male with past medical history of GERD, COPD, hypertension, hyperlipidemia, osteoarthritis, chronic CAD who presented to [facility] on 12/10/2022 complaining of above.   Notified of transfer arrival to PCU unit by nursing staff.  Patient was accepted for transfer from [facility] ED earlier today by Dr. Avelar for A. fib with RVR and acute on chronic hypoxic respiratory failure  with possible pulmonary edema.  History is somewhat challenging though he is alert oriented x3 and able to provide history, he sometimes gets lost in the information that he provides and what timing of events.  He has extensive past medical history-see assessment/plan for details.  Per available outside records the following was found.  He was admitted to [facility] between 12/7/2022 through 12/9/2022 and managed for acute COPD exacerbation, possible atypical pneumonia, and WOLF related to obstructive uropathy.  He was not on supplemental oxygen prior to this time though was discharged with 2 L nasal cannula and a course of empiric antibiotics.  VQ scan during that hospitalization was low probability of PE.  He had mild WOLF with a creatinine of 2. This improved to discharge creatinine of 1.4 with medical management and after pop cathether placement for obstructive uropathy.  He was able to void after the Pop catheter was removed and he was discharged with Flomax with recommendations to follow-up with urology due to questionable filling defect related to the prostate versus bladder mass measuring 1.5 cm in size.  Outpatient follow-up with urology was recommended  He went home and over the course of 24 hours still had persistent shortness of breath which prompted his presentation again to the emergency room.  At this time he was found to be in A. fib with RVR and was initiated on amiodarone and given IV Lasix 40 mg x 1. Chest x-ray revealed stable diffuse interstitial changes bilaterally with stable patchy multifocal airspace infiltrates. His white blood cell count was 20 hemoglobin was 9.7 platelets were 352.  Sodium was 135 creatinine was 1.2.  Albumin was 3.  BNP was 1095. Troponin I was 0.07.  Influenza panel COVID-19 and RSV were negative.  Transfer to tertiary facility was requested.     On arrival to our facility he is on 6 L nasal cannula.  He denies any active chest pain though reports 8 out  of 10 headache that is more right-sided with some sensitivity to light.  He describes a history of migraine headaches every few weeks and this is similar.  No focal deficits are noted.  Mild nausea as described.  She denies sinus tenderness.  No rhinorrhea.  Endorses generalized myalgias.  Denies hemoptysis.  Denies travel history.  No known sick contacts.  Cardiology initiated heparin drip on arrival.         amiodarone, 200 mg, Oral, Q12H  aspirin, 81 mg, Oral, Daily  buPROPion SR, 100 mg, Oral, Daily  ferrous sulfate, 324 mg, Oral, Daily With Breakfast  FLUoxetine, 40 mg, Oral, Daily  guaiFENesin, 600 mg, Oral, Daily  ipratropium-albuterol, 3 mL, Nebulization, 4x Daily - RT  pantoprazole, 40 mg, Oral, BID  [START ON 12/15/2022] predniSONE, 40 mg, Oral, Daily With Breakfast  pregabalin, 75 mg, Oral, BID  ranolazine, 1,000 mg, Oral, BID  rivaroxaban, 20 mg, Oral, Daily With Dinner  rosuvastatin, 20 mg, Oral, Nightly  sodium chloride, 10 mL, Intravenous, Q12H  sulfamethoxazole-trimethoprim, 1 tablet, Oral, Q12H  tamsulosin, 0.4 mg, Oral, Daily  vitamin B-12, 1,000 mcg, Oral, Daily       sodium chloride, 30 mL/hr, Last Rate: Stopped (12/13/22 0815)         Active Hospital Problems:  Active Hospital Problems    Diagnosis    • **Atrial fibrillation with rapid ventricular response (HCC)    • Acute on chronic respiratory failure with hypercapnia (HCC)    • COPD (chronic obstructive pulmonary disease) (HCC)    • Primary hypertension    • Coronary artery disease involving autologous vein coronary bypass graft without angina pectoris    • GERD without esophagitis    • Primary osteoarthritis involving multiple joints    • Mixed hyperlipidemia          Plan:      -Continue appropriate patient's home medications for other chronic medical conditions.  -Continue the present level of care.  -Patient and family agreed with the plan of care.  -Treat acute on chronic respiratory failure with oxygen therapy.  -Follow pulmonary  recommendations.  -Treat atrial fibrillation with RVR with a rate control strategy.  -Follow cardiology recommendations.  -Treat GERD with Protonix.  -Treated primary osteoarthritis with Tylenol.  -Patient is status post cardioversion.        DVT prophylaxis:  Medical and mechanical DVT prophylaxis orders are present.    CODE STATUS:    Code Status (Patient has no pulse and is not breathing): CPR (Attempt to Resuscitate)  Medical Interventions (Patient has pulse or is breathing): Full Support  Release to patient: Routine Release      Disposition: This wonderful patient can discharge in the next 48 to 72 hours.    This patient has been examined wearing appropriate Personal Protective Equipment and discussed with hospital infection control department, [facility], infectious disease specialist and pulmonologist. 12/14/22      Electronically signed by Delvin Da Silva MD, FACP, 12/14/22, 14:46 EST.      [facility] Hospitalist Team

## 2022-12-14 NOTE — PLAN OF CARE
Goal Outcome Evaluation:         Pt remains AV paced, xarelto started, plans for ablation in January. On 4L nasal cannula. Plans for cystoscopy this morning. Requiring norco for pain. VSS  Problem: Adult Inpatient Plan of Care  Goal: Absence of Hospital-Acquired Illness or Injury  Intervention: Identify and Manage Fall Risk  Recent Flowsheet Documentation  Taken 12/14/2022 0410 by Janett Toribio RN  Safety Promotion/Fall Prevention: safety round/check completed  Taken 12/14/2022 0205 by Janett Toribio RN  Safety Promotion/Fall Prevention: safety round/check completed  Taken 12/14/2022 0013 by Janett Toribio RN  Safety Promotion/Fall Prevention: safety round/check completed  Taken 12/13/2022 2203 by Janett Toribio RN  Safety Promotion/Fall Prevention: safety round/check completed  Taken 12/13/2022 2015 by Janett Toribio RN  Safety Promotion/Fall Prevention: safety round/check completed  Intervention: Prevent Skin Injury  Recent Flowsheet Documentation  Taken 12/14/2022 0013 by Janett Toribio RN  Skin Protection: adhesive use limited  Taken 12/13/2022 2015 by Janett Toribio RN  Skin Protection: adhesive use limited  Intervention: Prevent and Manage VTE (Venous Thromboembolism) Risk  Recent Flowsheet Documentation  Taken 12/13/2022 2015 by Janett Toribio RN  Activity Management: activity adjusted per tolerance  Intervention: Prevent Infection  Recent Flowsheet Documentation  Taken 12/14/2022 0410 by Janett Toribio RN  Infection Prevention: rest/sleep promoted  Taken 12/14/2022 0205 by Janett Toribio RN  Infection Prevention: rest/sleep promoted  Taken 12/14/2022 0013 by Janett Toribio RN  Infection Prevention: rest/sleep promoted  Taken 12/13/2022 2203 by Janett Toribio RN  Infection Prevention: rest/sleep promoted  Taken 12/13/2022 2015 by Janett Toribio RN  Infection Prevention: rest/sleep promoted  Goal: Optimal Comfort and  Wellbeing  Intervention: Provide Person-Centered Care  Recent Flowsheet Documentation  Taken 12/14/2022 0013 by Janett Toribio RN  Trust Relationship/Rapport: care explained  Taken 12/13/2022 2015 by Janett Toribio RN  Trust Relationship/Rapport: care explained     Problem: Fall Injury Risk  Goal: Absence of Fall and Fall-Related Injury  Intervention: Identify and Manage Contributors  Recent Flowsheet Documentation  Taken 12/13/2022 2015 by Janett Toribio RN  Medication Review/Management: medications reviewed  Intervention: Promote Injury-Free Environment  Recent Flowsheet Documentation  Taken 12/14/2022 0410 by Janett Toribio RN  Safety Promotion/Fall Prevention: safety round/check completed  Taken 12/14/2022 0205 by Janett Toribio RN  Safety Promotion/Fall Prevention: safety round/check completed  Taken 12/14/2022 0013 by Janett Toribio RN  Safety Promotion/Fall Prevention: safety round/check completed  Taken 12/13/2022 2203 by Janett Toribio RN  Safety Promotion/Fall Prevention: safety round/check completed  Taken 12/13/2022 2015 by Janett Toribio RN  Safety Promotion/Fall Prevention: safety round/check completed     Problem: Pain Acute  Goal: Acceptable Pain Control and Functional Ability  Intervention: Prevent or Manage Pain  Recent Flowsheet Documentation  Taken 12/13/2022 2015 by Janett Toribio RN  Medication Review/Management: medications reviewed  Intervention: Optimize Psychosocial Wellbeing  Recent Flowsheet Documentation  Taken 12/14/2022 0410 by Janett Toribio RN  Supportive Measures: active listening utilized  Diversional Activities: television  Taken 12/14/2022 0013 by Janett Toribio RN  Supportive Measures: active listening utilized  Diversional Activities: television  Taken 12/13/2022 2015 by Janett Toribio RN  Supportive Measures: active listening utilized  Diversional Activities: television     Problem: Breathing Pattern  Ineffective  Goal: Effective Breathing Pattern  Intervention: Promote Improved Breathing Pattern  Recent Flowsheet Documentation  Taken 12/14/2022 0410 by Janett Toribio, RN  Supportive Measures: active listening utilized  Taken 12/14/2022 0013 by Janett Toribio, RN  Supportive Measures: active listening utilized  Taken 12/13/2022 2015 by Janett Toribio, RN  Supportive Measures: active listening utilized

## 2022-12-15 PROBLEM — R53.81 PHYSICAL DEBILITY: Status: ACTIVE | Noted: 2022-01-01

## 2022-12-15 NOTE — THERAPY TREATMENT NOTE
"Subjective: Pt agreeable to therapeutic plan of care. Pt states that he would like to move around more for RA management.     Objective:     Bed mobility - SBA  Transfers - CGA  Ambulation - 80 feet x 2  CGA; decreased step length and arm swing. Steady sam  Reviewed ROM ex and performed AP, toe curls, ankle inv/ev, heel slides hip IR/ER and ABD BLEs.       Vitals: Desaturates 92% on 4L at rest; drops to 84% with ambulation, 80% upon seated rest before returning to 90-92% after 2 minutes.    Pain: 4 VAS   Location:gneralized RA pain; primarily shoulders, scapula and toes  Intervention for pain: Repositioned and Increased Activity    Education: Provided education on the importance of mobility in the acute care setting and Gait Training; pt is receptive.    Assessment: Fabian Manjarrez presents with functional mobility impairments which indicate the need for skilled intervention. Tolerating session today without incident. Pt is limited primarily by O2 desaturation with mobility.  He reports that last time he went home, he returned quickly due to SOA.  He may benefit from OP Pulmonary rehab at discharge.  Will continue to follow and progress as tolerated.     Plan/Recommendations:   Low Intensity Therapy recommended post-acute care - This is recommended as therapy feels this patient would require 2-3 visits per week. OP or HH would be the best option depending on patient's home bound status. Consider, if the patient has other  \"skilled\" needs such as wounds, IV antibiotics, etc. Combined with \"low intensity\" could also equate to a SNF. If patient is medically complex, consider LTAC.. Pt requires no DME at discharge.     Pt desires Home with Home Health at discharge. Pt cooperative; agreeable to therapeutic recommendations and plan of care.         Basic Mobility 6-click:  Rollin = Total, A lot = 2, A little = 3; 4 = None  Supine>Sit:   1 = Total, A lot = 2, A little = 3; 4 = None   Sit>Stand with arms:  1 = " Total, A lot = 2, A little = 3; 4 = None  Bed>Chair:   1 = Total, A lot = 2, A little = 3; 4 = None  Ambulate in room:  1 = Total, A lot = 2, A little = 3; 4 = None  3-5 Steps with railin = Total, A lot = 2, A little = 3; 4 = None  Score: 21    Modified Collin: N/A = No pre-op stroke/TIA    Post-Tx Position: Up in Chair, Alarms activated and Call light and personal items within reach; wife present  PPE: gloves and surgical mask

## 2022-12-15 NOTE — PLAN OF CARE
"Goal Outcome Evaluation:      Assessment: Fabian Manjarrez presents with functional mobility impairments which indicate the need for skilled intervention. Tolerating session today without incident. Pt is limited primarily by O2 desaturation with mobility.  He reports that last time he went home, he returned quickly due to SOA.  He may benefit from OP Pulmonary rehab at discharge.  Will continue to follow and progress as tolerated.      Plan/Recommendations:   Low Intensity Therapy recommended post-acute care - This is recommended as therapy feels this patient would require 2-3 visits per week. OP or HH would be the best option depending on patient's home bound status. Consider, if the patient has other  \"skilled\" needs such as wounds, IV antibiotics, etc. Combined with \"low intensity\" could also equate to a SNF. If patient is medically complex, consider LTAC.. Pt requires no DME at discharge.      Pt desires Home with Home Health at discharge. Pt cooperative; agreeable to therapeutic recommendations and plan of care.            "

## 2022-12-15 NOTE — PLAN OF CARE
Goal Outcome Evaluation:  Plan of Care Reviewed With: patient, spouse        Progress: no change  Outcome Evaluation: FC in place, patient to f/u as outpt. Patient in SR, continue to monitor. On home amount of oxygen.

## 2022-12-15 NOTE — PLAN OF CARE
Goal Outcome Evaluation:           Patient continues with ppo catheter and dark urine. No new complaints today but patient has been very withdrawn and tired. Patient was agreeable to sit up in the chair for a few hours today and 02 weaned to 4L  Problem: Adult Inpatient Plan of Care  Goal: Plan of Care Review  Outcome: Ongoing, Progressing  Goal: Patient-Specific Goal (Individualized)  Outcome: Ongoing, Progressing  Goal: Absence of Hospital-Acquired Illness or Injury  Outcome: Ongoing, Progressing  Intervention: Identify and Manage Fall Risk  Recent Flowsheet Documentation  Taken 12/15/2022 1400 by Linda James, RN  Safety Promotion/Fall Prevention:   safety round/check completed   room organization consistent   nonskid shoes/slippers when out of bed   fall prevention program maintained  Taken 12/15/2022 1218 by Linda James, RN  Safety Promotion/Fall Prevention:   nonskid shoes/slippers when out of bed   room organization consistent   safety round/check completed  Taken 12/15/2022 1025 by Linda James, RN  Safety Promotion/Fall Prevention:   nonskid shoes/slippers when out of bed   room organization consistent   safety round/check completed   fall prevention program maintained  Taken 12/15/2022 0852 by Linda James, RN  Safety Promotion/Fall Prevention:   nonskid shoes/slippers when out of bed   room organization consistent   safety round/check completed   fall prevention program maintained  Goal: Optimal Comfort and Wellbeing  Outcome: Ongoing, Progressing  Goal: Readiness for Transition of Care  Outcome: Ongoing, Progressing   .

## 2022-12-15 NOTE — PROGRESS NOTES
Daily Progress Note        Atrial fibrillation with rapid ventricular response (HCC)    COPD (chronic obstructive pulmonary disease) (HCC)    Primary hypertension    Coronary artery disease involving autologous vein coronary bypass graft without angina pectoris    GERD without esophagitis    Primary osteoarthritis involving multiple joints    Mixed hyperlipidemia    Acute on chronic respiratory failure with hypercapnia (HCC)      Assessment    Acute respiratory failure with hypoxia  Chronic Obstructive Pulmonary Disease  -recently discharged from [facility] on 2L oxygen      Interstitial Lung Disease:  [facility] chest ct 8/2022 shows ULP with honeycombing and bronchiectasis   -recently treated with tapering dose of prednisone      Rheumatoid arthritis, treated with Leflunomide (Arava) and chronic prednsione  -followed by Dr Lauren     Atrial Fibrillation with RVR  -Status post cardioversion 12/13/2022  SSS s/p pacer     12/11/2022 ECHO  EF 40%  RVSP 22.6 mmHg  -mild AV stenosis  -Right ventricle severely dilated      Hypertension  GERD     Recommendations:    CXR today     Respiratory culture, pending    Bactrim DS 1 PO BID  prednisone 40 mg daily  Patient is immunocompromised on chronic prednisone for RA    Titrate oxygen, currently requiring 5L per NC  -Patient will need a 6-minute walk prior to discharge     Mucinex BID  Bronchodilater  Amiodarone po          LOS: 5 days     Subjective         Objective     Vital signs for last 24 hours:  Vitals:    12/14/22 1833 12/14/22 2138 12/15/22 0151 12/15/22 0531   BP:  118/57 106/52 116/64   BP Location:  Right arm Right arm Right arm   Patient Position:  Lying Lying Lying   Pulse: 73 75 73 72   Resp: 14 11 13 11   Temp:  97.7 °F (36.5 °C) 97.7 °F (36.5 °C) 97.6 °F (36.4 °C)   TempSrc:  Oral Oral Oral   SpO2: 99% 94% 93% 92%   Weight:    70.9 kg (156 lb 4.9 oz)   Height:           Intake/Output last 3 shifts:  I/O last 3 completed shifts:  In: 600 [P.O.:600]  Out:  4700 [Urine:4700]  Intake/Output this shift:  No intake/output data recorded.      Radiology  Imaging Results (Last 24 Hours)     ** No results found for the last 24 hours. **          Labs:  Results from last 7 days   Lab Units 12/15/22  0034   WBC 10*3/mm3 23.40*   HEMOGLOBIN g/dL 8.9*   HEMATOCRIT % 27.8*   PLATELETS 10*3/mm3 338     Results from last 7 days   Lab Units 12/12/22  0457 12/11/22  0042   SODIUM mmol/L 134* 135*   POTASSIUM mmol/L 4.4 3.9   CHLORIDE mmol/L 98 97*   CO2 mmol/L 24.0 24.0   BUN mg/dL 18 17   CREATININE mg/dL 0.90 1.15   CALCIUM mg/dL 8.4* 8.2*   BILIRUBIN mg/dL  --  0.4   ALK PHOS U/L  --  174*   ALT (SGPT) U/L  --  12   AST (SGOT) U/L  --  27   GLUCOSE mg/dL 159* 92         Results from last 7 days   Lab Units 12/11/22  0042   ALBUMIN g/dL 2.90*     Results from last 7 days   Lab Units 12/12/22  0457 12/11/22  0752 12/11/22  0042   TROPONIN T ng/mL 0.027 0.044* 0.056*             Results from last 7 days   Lab Units 12/15/22  0034 12/14/22  0027 12/13/22  1210 12/11/22  0550 12/11/22  0042   INR   --   --   --   --  1.08   APTT seconds 27.2* 26.2* 35.2*   < > 25.9*    < > = values in this interval not displayed.               Meds:   SCHEDULE  amiodarone, 200 mg, Oral, Q12H  aspirin, 81 mg, Oral, Daily  buPROPion SR, 100 mg, Oral, Daily  ferrous sulfate, 324 mg, Oral, Daily With Breakfast  FLUoxetine, 40 mg, Oral, Daily  guaiFENesin, 600 mg, Oral, Daily  ipratropium-albuterol, 3 mL, Nebulization, 4x Daily - RT  pantoprazole, 40 mg, Oral, BID  predniSONE, 40 mg, Oral, Daily With Breakfast  pregabalin, 75 mg, Oral, BID  ranolazine, 1,000 mg, Oral, BID  rivaroxaban, 20 mg, Oral, Daily With Dinner  rosuvastatin, 20 mg, Oral, Nightly  sodium chloride, 10 mL, Intravenous, Q12H  sulfamethoxazole-trimethoprim, 1 tablet, Oral, Q12H  tamsulosin, 0.4 mg, Oral, Daily  vitamin B-12, 1,000 mcg, Oral, Daily      Infusions  sodium chloride, 30 mL/hr, Last Rate: Stopped (12/13/22 0815)      PRNs  •   acetaminophen  •  albuterol  •  docusate sodium  •  HYDROcodone-acetaminophen  •  Morphine  •  ondansetron  •  sodium chloride  •  sodium chloride    Physical Exam:  Physical Exam  Vitals reviewed.   Pulmonary:      Effort: Pulmonary effort is normal.      Breath sounds: Wheezing and rhonchi present.   Skin:     General: Skin is warm and dry.   Neurological:      Mental Status: He is alert and oriented to person, place, and time.         ROS  Review of Systems   Respiratory: Positive for cough and shortness of breath.        I have reviewed the patient's new clinical results.    Electronically signed by Vera Marlow MD

## 2022-12-15 NOTE — CASE MANAGEMENT/SOCIAL WORK
Continued Stay Note  [facility]     Patient Name: Fabian Manjarrez  MRN: [id number]  Today's Date: 12/15/2022    Admit Date: 12/10/2022    Plan: D/C Plan: Anticipate home with spouse and [facility] (pending acceptance, order placed). Current home oxygen (2L) with [facility]. Xarelto copay $121/month, pt agreeable.   Discharge Plan     Row Name 12/15/22 1443       Plan    Plan D/C Plan: Anticipate home with spouse and [facility] (pending acceptance, order placed). Current home oxygen (2L) with [facility]. Xarelto copay $121/month, pt agreeable.    Patient/Family in Agreement with Plan yes    Provided Post Acute Provider List? Yes    Post Acute Provider List Home Health    Delivered To Patient    Method of Delivery In person    Plan Comments  spoke to patient at bedside wearing mask and keeping distance greater than 6 feet and spent less than 15 minutes in room. CM discussed home health, patient is agreeable to 1. [facility] 2. [facility]. CM contacted Meagan with [facility] ([phone]), anticipates they can accept, records/order faxed to [phone]. Patient denies any further d/c needs at this time. Patient states he will need portable O2 tank from [facility] at d/c for transport home. Barrier to D/C: 5L O2, urology following.                    Expected Discharge Date and Time     Expected Discharge Date Expected Discharge Time    Dec 17, 2022         Met with patient in room wearing PPE: mask.    Maintained distance greater than six feet and spent less than 15 minutes in the room.      SAMMIE GloverN, RN    [address] [address]    Office: [phone]  Fax: [phone]

## 2022-12-15 NOTE — DISCHARGE PLACEMENT REQUEST
"[facility] Referral-Anticipate d/c this weekend.     Please let me know if you can accept.     Thanks!  ALON Glover, RN    [facility]  [address] [place] [address]    Office: [phone]  Fax: [phone]            Mark Manjarrez (67 y.o. Male)     Date of Birth   1955    Social Security Number       Address    [facility] [address] [place] [address]    Home Phone   [phone]    MRN   [id number]       Protestant   None    Marital Status                               Admission Date   12/10/22    Admission Type   Urgent    Admitting Provider   Delvin Da Silva MD    Attending Provider   Delvin Da Silva MD    Department, Room/Bed   [facility] PROGRESS CARE,        Discharge Date       Discharge Disposition       Discharge Destination                               Attending Provider: Delvin Da Silva MD    Allergies: Leflunomide, Methotrexate, Nitroglycerin, Nsaids, Plaquenil [Hydroxychloroquine]    Isolation: None   Infection: None   Code Status: CPR    Ht: 170.2 cm (67\")   Wt: 70.9 kg (156 lb 4.9 oz)    Admission Cmt: None   Principal Problem: Atrial fibrillation with rapid ventricular response (HCC) [I48.91]                 Active Insurance as of 12/10/2022     Primary Coverage     Payor Plan Insurance Group Employer/Plan Group    MEDICARE MEDICARE A & B      Payor Plan Address Payor Plan Phone Number Payor Plan Fax Number Effective Dates    [address] [phone]  2019 - None Entered    [place] [address]       Subscriber Name Subscriber Birth Date Member ID       MARK MANJARREZ 1955 [id number]                 Emergency Contacts          No emergency contacts on file.               History & Physical      Elmer Green MD at 22 0201              [facility] Medicine Services      Patient Name: Mark Manjarrez  : 1955  MRN: [id number]  Primary Care Physician:  Provider, No " Known  Date of admission: 12/10/2022      Subjective       Chief Complaint: Shortness of breath    History of Present Illness: Fabian Manjarrez is a 67 y.o. male who presented to [facility] on 12/10/2022 complaining of above.   Notified of transfer arrival to PCU unit by nursing staff.  Patient was accepted for transfer from [facility] ED earlier today by Dr. Avelar for A. fib with RVR and acute on chronic hypoxic respiratory failure with possible pulmonary edema.  History is somewhat challenging though he is alert oriented x3 and able to provide history he sometimes gets lost in the information that he provides and what timing of events.  He has extensive past medical history-see assessment/plan for details.  Per available outside records the following was found.    He was admitted to [facility] between 12/7/2022 through 12/9/2022 and managed for acute COPD exacerbation, possible atypical pneumonia, and WOLF related to obstructive uropathy.  He was not on supplemental oxygen prior to this time though was discharged with 2 L nasal cannula and a course of empiric antibiotics.  VQ scan during that hospitalization was low probability of PE.  He had mild WOLF with a creatinine of 2. This improved to discharge creatinine of 1.4 with medical management and after sanford cathether placement for obstructive uropathy.  He was able to void after the Sanford catheter was removed and he was discharged with [facility] with recommendations to follow-up with urology due to questionable filling defect related to the prostate versus bladder mass measuring 1.5 cm in size.  Outpatient follow-up with urology was recommended.    He went home and over the course of 24 hours still had persistent shortness of breath which prompted his presentation again to the emergency room.  At this time he was found to be in A. fib with RVR and was initiated on amiodarone and given IV Lasix 40 mg x 1. Chest x-ray revealed  stable diffuse interstitial changes bilaterally with stable patchy multifocal airspace infiltrates. His white blood cell count was 20 hemoglobin was 9.7 platelets were 352.  Sodium was 135 creatinine was 1.2.  Albumin was 3.  BNP was 1095. Troponin I was 0.07.  Influenza panel COVID-19 and RSV were negative.  Transfer to tertiary facility was requested.    On arrival to our facility he is on 6 L nasal cannula.  He denies any active chest pain though reports 8 out of 10 headache that is more right-sided with some sensitivity to light.  He describes a history of migraine headaches every few weeks and this is similar.  No focal deficits are noted.  Mild nausea as described.  She denies sinus tenderness.  No rhinorrhea.  Endorses generalized myalgias.  Denies hemoptysis.  Denies travel history.  No known sick contacts.  Cardiology initiated heparin drip on arrival.      ROS: 10 point ROS negative aside from noted per HPI: No dysuria. No hemoptysis      Personal History     Past Medical History:   Diagnosis Date   • Arthritis    • COPD (chronic obstructive pulmonary disease) (HCC)    • Coronary artery disease    • Elevated cholesterol    • GERD (gastroesophageal reflux disease)    • History of transfusion    • Hypertension        Past Surgical History:   Procedure Laterality Date   • ABDOMINAL SURGERY     • CARDIAC CATHETERIZATION     • CARDIAC SURGERY     • COLON SURGERY     • HERNIA REPAIR       CABG  Hernia repair  Cholecystectomy  Permanent pacemaker  History of left-sided lumpectomy      Family History: family history is not on file.  Multiple family members with various cancers reported.  He describes a mother with breast cancer/myelodysplastic disorder.  Sister with melanoma.  Sister with colon cancer.  History and is somewhat variable as noted per HPI.    Social History:      Home Medications:  Prior to Admission Medications     Prescriptions Last Dose Informant Patient Reported? Taking?    albuterol sulfate HFA  108 (90 Base) MCG/ACT inhaler 12/9/2022  Yes Yes    Inhale 1 puff Every 4 (Four) Hours As Needed for Wheezing.    aspirin 81 MG EC tablet 12/9/2022  Yes Yes    Take 81 mg by mouth Daily.    buPROPion SR (WELLBUTRIN SR) 100 MG 12 hr tablet 12/9/2022  Yes Yes    Take 100 mg by mouth Daily.    docusate sodium (COLACE) 100 MG capsule 12/9/2022  Yes Yes    Take 100 mg by mouth Daily As Needed for Constipation.    ezetimibe (ZETIA) 10 MG tablet 12/9/2022  Yes Yes    Take 10 mg by mouth Daily.    ferrous sulfate 325 (65 FE) MG tablet 12/9/2022  Yes Yes    Take 40 mg by mouth Daily With Breakfast.    FLUoxetine (PROzac) 20 MG capsule 12/9/2022  Yes Yes    Take 40 mg by mouth Daily.    guaiFENesin (MUCINEX) 600 MG 12 hr tablet 12/9/2022  Yes Yes    Take 600 mg by mouth Daily.    HYDROcodone-acetaminophen (NORCO)  MG per tablet 12/9/2022  Yes Yes    Take 1 tablet by mouth Every 4 (Four) Hours As Needed for Moderate Pain.    metoprolol tartrate (LOPRESSOR) 25 MG tablet 12/9/2022  Yes Yes    Take 25 mg by mouth 2 (Two) Times a Day.    pantoprazole (PROTONIX) 40 MG EC tablet 12/9/2022  Yes Yes    Take 40 mg by mouth 2 (Two) Times a Day.    predniSONE (DELTASONE) 5 MG tablet 12/9/2022  Yes Yes    Take 5 mg by mouth Daily.    pregabalin (LYRICA) 75 MG capsule 12/9/2022  Yes Yes    Take 75 mg by mouth 2 (Two) Times a Day.    ranolazine (RANEXA) 500 MG 12 hr tablet 12/9/2022  Yes Yes    Take 1,000 mg by mouth 2 (Two) Times a Day.    rosuvastatin (CRESTOR) 20 MG tablet 12/9/2022  Yes Yes    Take 20 mg by mouth Every Night.    tamsulosin (FLOMAX) 0.4 MG capsule 24 hr capsule 12/9/2022  Yes Yes    Take 1 capsule by mouth Daily.    vitamin B-12 (CYANOCOBALAMIN) 1000 MCG tablet 12/9/2022  Yes Yes    Take 1,000 mcg by mouth Daily.            Allergies:  Allergies   Allergen Reactions   • Nitroglycerin Headache   • Nsaids GI Bleeding       Objective       Vitals:   Temp:  [98.9 °F (37.2 °C)] 98.9 °F (37.2 °C)  Heart Rate:  [76]  76  Resp:  [16] 16  BP: (121)/(70) 121/70    Physical Exam   Physical Exam:     GEN/CONS:   Vitals noted above, NAD, AOX3, white male 6 L nasal cannula   EYES:  Conjunctiva pink   ENMT:  NECK:  Atraumatic external nose/ears, Oropharynx clear  Symmetric, trachea midline   CV:   Mildly tachycardic heart rate 101 on monitor, Reg S1/S2, no murmur   PULM:  Mild expiratory wheezing bilaterally with scattered rhonchi   GI:  : Soft, Nontender, Nondistended   Deferred.    MSK:  No cyanosis, No LE edema   NEURO:    Sensation and motor symmetric and grossly intact and nonfocal--moving 4 extremities.   PSYCH:   Appropriate mood and affect         Result Review    Result Review:  I have personally reviewed the results from the time of this admission to 12/10/2022 23:43 EST and agree with these findings:  [x]  Laboratory  []  Microbiology  [x]  Radiology  [x]  EKG/Telemetry   [x]  Cardiology/Vascular   []  Pathology  [x]  Old records  []  Other:  Most notable findings include:    LABS                              Brief Urine Lab Results     None        ________________________________  MICRO  Microbiology Results (last 10 days)     ** No results found for the last 240 hours. **        ________________________________  RAD     ________________________________  CARDS     No results found for this or any previous visit.        Assessment & Plan        Active Hospital Problems:  Active Hospital Problems    Diagnosis    • **Atrial fibrillation with rapid ventricular response (HCC)        Assessment:    Atrial fibrillation with RVR/ VT  • Admission: OSH (outside hospital)  labs as noted per HPI: Trop I wnl, BNP 1098  • Imaging: CXR at OSH revealing stable diffuse interstitial changes bilaterally with stable patchy multifocal airspace disease per report.  • ECG: Local 12/10/2022 atrial fibrillation rate controlled RBBB.  Personally ordered and reviewed.  • ECHO: Last available on our record 2016: EF 55 to 60%.  Of note patient was  "hospitalized 8/2022 (see SSS) where her EF was described to be normal.  • Context/Misc: Transferred from [facility] ED. Has permanent pacemaker for sick sinus syndrome  History of sick sinus syndrome with permanent pacemaker   · Noted per Dr. Reyna AGARWAL Summary from [facility] 8/30/2022: (CareEverywhere)--please see note for full details.  Briefly was admitted for chest pain and underwent left heart catheterization that did not require intervention.  EF was noted to be \"stable\" and later in the note indicated previously was \"normal\".   Coronary artery disease status post CABG/History of right bundle branch block  · See above for details (#1/2)  Acute hypoxic respiratory failure  • Admission: Arrived from [facility] on 6 L nasal cannula.  Per outside hospital labs: Influenza panel/RSV/COVID-19 negative, troponin I within normal limits, WBC 20 though most recently hospitalized for COPD and was on steroids.  • Not chronically on supplemental oxygen per se though recently hospitalized at [facility] 12/7/2022 - 12/9/2022 for COPD exacerbation and discharged with 2 L nasal cannula.  History of interstitial lung disease  · Noted per outpatient records per Dr. Reyna AGARWAL summary 8/2022 (outside hospital): \"prior imaging showed evidence of possible interstitial lung disease. High-resolution CT of the chest did confirm this. Pulmonary was consulted who recommended a prolonged taper of prednisone and then follow-up as an outpatient for pulmonary function testing\"  · CT chest 8/29/2022: Chronic interstitial lung disease as described with a UIP type pattern. Given the patient's history of rheumatoid arthritis findings could be related to rheumatoid related interstitial lung disease. Other differentials could include granulomatous process like sarcoid disease given the presence of multiple calcified granulomas  · He indicates he is not followed by pulmonology at this " time.  · ?related to RA  COPD  · Noted per chart review history.  Not chronically on medications  · See Acute Hypoxic Respiratory Failure above.  Rheumatoid arthritis  · Follows with Dr. Lauren rheumatology-clinic note 5/2022 reviewed.  Was treated with leflunomide previously  Immunosuppression  · Endorses chronically on prednisone 5 mg daily  Urinary retention  · Will require outpatient follow-up with urology regarding renal ultrasound findings from [facility] as noted per discharge summary 12/9/2022.  This is in regards to 1.5 cm prostate shadow/luminal defect versus bladder mass.   · Currently has pop catheter in place on arrival  Anemia-Normocytic/Suspect on Chronic Disease  · Admission: OSH Hgb 9.7 12/10/22  History of TIA  Hypertension  Hyperlipidemia  GERD  Chronic back pain/degenerative joint disease  Depression with anxiety  Full code  · Discussed and verified    Discussion/Plan:     Notified of transfer arrival to PCU from [facility] ED for A. fib with RVR and acute on chronic hypoxic respiratory failure.  Patient was accepted by Dr. Avelar 12/10/2022.  Cardiology consultation--notified of arrival.  Appreciate assistance  Continue amiodarone gtt. Heparin gtt initiated by Cardiology.    Continue prior to admission beta-blocker.  Continue aspirin/Ranexa/statin  Request permanent pacemaker interrogation  Telemetry monitoring.  Trend troponin.  Ordered ECG-noted above.  Obtain echocardiogram    Currently on 6 L nasal cannula.  Wean as tolerated  Clinically does not appear to be grossly volume overloaded  IV steroids with DANIEL/IAM prn.   Of note when weaning off IV/tapering steroids, pt is on chronic steroids (Prednsione 5 daily).  Pulmonary consultation with consideration for possible ILD contribution.     Await locally ordered labs and chest x-ray given increased O2 requirements from OSH ED.    Continue prior to admission antidepressants  Continue Flomax.  Pop in place on arrival.   Consider voiding trial next 24-48 with outpatient follow-up with urology    Continue PPI  Surveillance labs.  See orders for additional details.  Pharmacy to confirm home medications and will resume/adjust appropriate medications if necessary once confirmed and notified as ordered.  Further recommendations to follow as inpatient course and evaluation proceeds.   Daytime hospitalist provider will continue forward care in the morning.       DVT prophylaxis:  Mechanical DVT prophylaxis orders are present.    CODE STATUS:       Admission Status:  I believe this patient meets inpatient status.    I discussed the patient's findings and my recommendations with patient.    This patient has been examined wearing appropriate Personal Protective Equipment     Signature: Elmer Green MD    Electronically signed by Elmer Green MD at 12/11/22 0412       Referral Orders (last 24 hours) (24h ago, onward)     Start     Ordered    12/15/22 0000  Ambulatory Referral to [facility] ([facility])        Question Answer Comment   Face to Face Visit Date: 12/15/2022    Follow-up provider for Plan of Care? I treated the patient in an acute care facility and will not continue treatment after discharge.    Follow-up provider: LEA ABREU    Reason/Clinical Findings Weakness, A-fib    Describe mobility limitations that make leaving home difficult: weakness, a-fib, respiratiry failure    Nursing/Therapeutic Services Requested Skilled Nursing    Nursing/Therapeutic Services Requested Physical Therapy    Skilled nursing orders: Medication education eval and treat post-hosptialization   Skilled nursing orders: Cardiopulmonary assessments    PT orders:  eval and treat   Frequency: 1 Week 1        12/15/22 1254              Ambulatory Referral to [facility] ([facility]) [[id number]] (Order [id number])  Order  Date: 12/15/2022 Department: [facility] CARE Ordering/Authorizing: Delvin Da Silva MD     Order  History  Outpatient  Date/Time Action Taken User Additional Information   12/15/22 1254 Sign Cherise Patel Ordering Mode: Verbal with readback   12/15/22 1436 Verbal Cosign Delvin Da Silva MD      Order Details    Frequency Duration Priority Order Class   None None Routine Internal Referral     Start Date/Time    Start Date   12/15/22     Order Information    Order Date Service Start Date Start Time   12/15/22 Medicine 12/15/22      Reference Links    Associated Reports External References   View Encounter Current Health Referral Information     Order Questions    Question Answer Comment   Face to Face Visit Date: 12/15/2022    Follow-up provider for Plan of Care? I treated the patient in an acute care facility and will not continue treatment after discharge.    Follow-up provider: LEA ABREU    Reason/Clinical Findings Weakness, A-fib    Describe mobility limitations that make leaving home difficult: weakness, a-fib, respiratiry failure    Nursing/Therapeutic Services Requested Skilled Nursing     Physical Therapy    Skilled nursing orders: Medication education eval and treat post-hosptialization    Cardiopulmonary assessments    PT orders:  eval and treat   Frequency: 1 Week 1             Verbal / Cosign Order Info    Action Created on Order Mode Entered by Responsible Provider Signed by Signed on   Ordering 12/15/22 1254 Verbal with readback Cherise Patel Charles O, MD Olisa, Charles O, MD 12/15/22 1436              Physician Progress Notes (last 24 hours)      Delvin Da Silva MD at 12/15/22 1232              [facility] Medicine Services Daily Progress Note    Patient Name: Fabian Manjarrez  : 1955  MRN: [id number]  Primary Care Physician:  Lea Abreu MD  Date of admission: 12/10/2022      Subjective       Chief Complaint: Shortness of breath.      Patient Reports:        2022.  Patient was seen and examined.  Patient reported no new  symptoms.  Patient requested a diet.      12/13/2022.  Patient was seen and examined.  Patient reported significant improvement in his symptoms.  Patient is status post cardioversion.      12/14/2022.  Patient was seen and examined.  Patient requested a diet similar to his his diet at home.  Patient reported no overnight events.      12/15/2022.  Patient was seen and examined.  Patient is on 5 L of oxygen via nasal cannula.  We will continue to titrate down.  Patient complained of sore throat and was diagnosed with thrush.  Nystatin was ordered.  Sanford catheter still in place and the urology is following.  This wonderful patient can discharge in the next 24 hours with improvements in the pulmonary function and urogenital function.      Review of Systems   Constitutional: Positive for malaise/fatigue.   HENT: Negative.    Eyes: Negative.    Cardiovascular: Negative for palpitations.   Respiratory: Positive for shortness of breath.    Endocrine: Negative.    Hematologic/Lymphatic: Negative.    Skin: Negative.    Musculoskeletal: Negative.    Gastrointestinal: Negative.    Genitourinary: Negative.    Neurological: Positive for weakness.   Psychiatric/Behavioral: Negative.    Allergic/Immunologic: Negative.             Objective       Vitals:   Temp:  [97.6 °F (36.4 °C)-97.8 °F (36.6 °C)] 97.8 °F (36.6 °C)  Heart Rate:  [70-93] 72  Resp:  [10-23] 12  BP: (106-118)/(52-64) 106/60  Flow (L/min):  [2-5] 4    Physical Exam  Vitals reviewed.   Constitutional:       General: He is not in acute distress.  HENT:      Head: Normocephalic.      Nose: Nose normal.      Mouth/Throat:      Mouth: Mucous membranes are dry.      Pharynx: Oropharynx is clear.   Eyes:      Extraocular Movements: Extraocular movements intact.      Conjunctiva/sclera: Conjunctivae normal.      Pupils: Pupils are equal, round, and reactive to light.   Cardiovascular:      Pulses: Normal pulses.      Heart sounds: No murmur heard.    No friction rub. No  gallop.      Comments: S1 and S2 present.  No tachycardia.  Pulmonary:      Breath sounds: No stridor. No wheezing or rales.      Comments: Decreased air entry bilaterally.  Chest:      Chest wall: No tenderness.   Abdominal:      General: Bowel sounds are normal. There is no distension.      Palpations: Abdomen is soft. There is no mass.      Tenderness: There is no abdominal tenderness. There is no right CVA tenderness, left CVA tenderness, guarding or rebound.      Hernia: No hernia is present.   Genitourinary:     Comments: Sanford catheter in place.  Musculoskeletal:         General: No swelling, tenderness, deformity or signs of injury.      Cervical back: Normal range of motion. No rigidity.      Right lower leg: No edema.      Left lower leg: No edema.   Skin:     General: Skin is warm and dry.      Capillary Refill: Capillary refill takes less than 2 seconds.      Coloration: Skin is not jaundiced.      Findings: No bruising, erythema, lesion or rash.   Neurological:      Comments: No facial asymmetry noted.  Gait and station not tested.   Psychiatric:      Comments: No agitation.               Result Review    Result Review:  I have personally reviewed the results from the time of this admission to 12/15/2022 12:36 EST and agree with these findings:  []  Laboratory  []  Microbiology  []  Radiology  []  EKG/Telemetry   []  Cardiology/Vascular   []  Pathology  []  Old records  []  Other:  Most notable findings include:          Assessment & Plan    From previous notes and with minor updates.    Brief Patient Summary:    Patient is a 67 y.o. male with past medical history of chronic coronary artery disease, GERD, COPD, hypertension, hyperlipidemia, osteoarthritis who [facility] complaining of above.   Notified of transfer arrival to PCU unit by nursing staff.  Patient was accepted for transfer from [facility] ED earlier today by Dr. Avelar for A. fib with RVR  and acute on chronic hypoxic respiratory failure with possible pulmonary edema.  History is somewhat challenging though he is alert oriented x3 and able to provide history, he sometimes gets lost in the information that he provides and what timing of events.  He has extensive past medical history-see assessment/plan for details.  Per available outside records the following was found.  He was admitted to [facility] between 12/7/2022 through 12/9/2022 and managed for acute COPD exacerbation, possible atypical pneumonia, and WOLF related to obstructive uropathy.  He was not on supplemental oxygen prior to this time though was discharged with 2 L nasal cannula and a course of empiric antibiotics.  VQ scan during that hospitalization was low probability of PE.  He had mild WOLF with a creatinine of 2. This improved to discharge creatinine of 1.4 with medical management and after pop cathether placement for obstructive uropathy.  He was able to void after the Pop catheter was removed and he was discharged with Flomax with recommendations to follow-up with urology due to questionable filling defect related to the prostate versus bladder mass measuring 1.5 cm in size.  Outpatient follow-up with urology was recommended  He went home and over the course of 24 hours still had persistent shortness of breath which prompted his presentation again to the emergency room.  At this time he was found to be in A. fib with RVR and was initiated on amiodarone and given IV Lasix 40 mg x 1. Chest x-ray revealed stable diffuse interstitial changes bilaterally with stable patchy multifocal airspace infiltrates. His white blood cell count was 20 hemoglobin was 9.7 platelets were 352.  Sodium was 135 creatinine was 1.2.  Albumin was 3.  BNP was 1095. Troponin I was 0.07.  Influenza panel COVID-19 and RSV were negative.  Transfer to tertiary facility was requested.     On arrival to our facility he is on 6 L nasal cannula.  He  denies any active chest pain though reports 8 out of 10 headache that is more right-sided with some sensitivity to light.  He describes a history of migraine headaches every few weeks and this is similar.  No focal deficits are noted.  Mild nausea as described.  She denies sinus tenderness.  No rhinorrhea.  Endorses generalized myalgias.  Denies hemoptysis.  Denies travel history.  No known sick contacts.  Cardiology initiated heparin drip on arrival.         amiodarone, 200 mg, Oral, Q12H  aspirin, 81 mg, Oral, Daily  buPROPion SR, 100 mg, Oral, Daily  ferrous sulfate, 324 mg, Oral, Daily With Breakfast  FLUoxetine, 40 mg, Oral, Daily  guaiFENesin, 600 mg, Oral, Daily  ipratropium-albuterol, 3 mL, Nebulization, 4x Daily - RT  nystatin, 5 mL, Swish & Swallow, 4x Daily  pantoprazole, 40 mg, Oral, BID  predniSONE, 40 mg, Oral, Daily With Breakfast  pregabalin, 75 mg, Oral, BID  ranolazine, 1,000 mg, Oral, BID  rivaroxaban, 20 mg, Oral, Daily With Dinner  rosuvastatin, 20 mg, Oral, Nightly  sodium chloride, 10 mL, Intravenous, Q12H  sulfamethoxazole-trimethoprim, 1 tablet, Oral, Q12H  tamsulosin, 0.4 mg, Oral, Daily  vitamin B-12, 1,000 mcg, Oral, Daily       sodium chloride, 30 mL/hr, Last Rate: Stopped (12/13/22 0815)         Active Hospital Problems:  Active Hospital Problems    Diagnosis    • **Atrial fibrillation with rapid ventricular response (HCC)    • Acute on chronic respiratory failure with hypercapnia (HCC)    • Physical debility    • COPD (chronic obstructive pulmonary disease) (HCC)    • Primary hypertension    • Coronary artery disease involving autologous vein coronary bypass graft without angina pectoris    • GERD without esophagitis    • Primary osteoarthritis involving multiple joints    • Mixed hyperlipidemia          Plan:      -Continue appropriate patient's home medications for other chronic medical conditions.  -Continue the present level of care.  -Patient and family agreed with the plan of  care.  -Treat acute on chronic respiratory failure with oxygen therapy.  -Follow pulmonary recommendations.  -Treat atrial fibrillation with RVR with a rate control strategy.  -Follow cardiology recommendations.  -Treat GERD with Protonix.  -Treated primary osteoarthritis with Tylenol.  -Patient is status post cardioversion.  -Treat physical debility with physical therapy.        DVT prophylaxis:  Medical and mechanical DVT prophylaxis orders are present.    CODE STATUS:    Code Status (Patient has no pulse and is not breathing): CPR (Attempt to Resuscitate)  Medical Interventions (Patient has pulse or is breathing): Full Support  Release to patient: Routine Release      Disposition: This wonderful patient can discharge in the next 48 to 72 hours.    This patient has been examined wearing appropriate Personal Protective Equipment and discussed with hospital infection control department, [facility], infectious disease specialist and pulmonologist. 12/15/22      Electronically signed by Delvin Da Silva MD, FACP, 12/15/22, 12:36 EST.      [facility] [place] [facility]             Electronically signed by Delvin Da Silva MD at 12/15/22 1236     Vera Marlow MD at 12/15/22 0701          Daily Progress Note        Atrial fibrillation with rapid ventricular response (HCC)    COPD (chronic obstructive pulmonary disease) (HCC)    Primary hypertension    Coronary artery disease involving autologous vein coronary bypass graft without angina pectoris    GERD without esophagitis    Primary osteoarthritis involving multiple joints    Mixed hyperlipidemia    Acute on chronic respiratory failure with hypercapnia (HCC)      Assessment    Acute respiratory failure with hypoxia  Chronic Obstructive Pulmonary Disease  -recently discharged from [facility] hospital on 2L oxygen      Interstitial Lung Disease:  [facility] chest ct 8/2022 shows ULP with honeycombing and bronchiectasis   -recently treated with tapering dose of  prednisone      Rheumatoid arthritis, treated with Leflunomide (Arava) and chronic prednsione  -followed by Dr Lauren     Atrial Fibrillation with RVR  -Status post cardioversion 12/13/2022  SSS s/p pacer     12/11/2022 ECHO  EF 40%  RVSP 22.6 mmHg  -mild AV stenosis  -Right ventricle severely dilated      Hypertension  GERD     Recommendations:    CXR today     Respiratory culture, pending    Bactrim DS 1 PO BID  prednisone 40 mg daily  Patient is immunocompromised on chronic prednisone for RA    Titrate oxygen, currently requiring 5L per NC  -Patient will need a 6-minute walk prior to discharge     Mucinex BID  Bronchodilater  Amiodarone po          LOS: 5 days     Subjective         Objective     Vital signs for last 24 hours:  Vitals:    12/14/22 1833 12/14/22 2138 12/15/22 0151 12/15/22 0531   BP:  118/57 106/52 116/64   BP Location:  Right arm Right arm Right arm   Patient Position:  Lying Lying Lying   Pulse: 73 75 73 72   Resp: 14 11 13 11   Temp:  97.7 °F (36.5 °C) 97.7 °F (36.5 °C) 97.6 °F (36.4 °C)   TempSrc:  Oral Oral Oral   SpO2: 99% 94% 93% 92%   Weight:    70.9 kg (156 lb 4.9 oz)   Height:           Intake/Output last 3 shifts:  I/O last 3 completed shifts:  In: 600 [P.O.:600]  Out: 4700 [Urine:4700]  Intake/Output this shift:  No intake/output data recorded.      Radiology  Imaging Results (Last 24 Hours)     ** No results found for the last 24 hours. **          Labs:  Results from last 7 days   Lab Units 12/15/22  0034   WBC 10*3/mm3 23.40*   HEMOGLOBIN g/dL 8.9*   HEMATOCRIT % 27.8*   PLATELETS 10*3/mm3 338     Results from last 7 days   Lab Units 12/12/22  0457 12/11/22  0042   SODIUM mmol/L 134* 135*   POTASSIUM mmol/L 4.4 3.9   CHLORIDE mmol/L 98 97*   CO2 mmol/L 24.0 24.0   BUN mg/dL 18 17   CREATININE mg/dL 0.90 1.15   CALCIUM mg/dL 8.4* 8.2*   BILIRUBIN mg/dL  --  0.4   ALK PHOS U/L  --  174*   ALT (SGPT) U/L  --  12   AST (SGOT) U/L  --  27   GLUCOSE mg/dL 159* 92         Results from  last 7 days   Lab Units 12/11/22  0042   ALBUMIN g/dL 2.90*     Results from last 7 days   Lab Units 12/12/22  0457 12/11/22  0752 12/11/22  0042   TROPONIN T ng/mL 0.027 0.044* 0.056*             Results from last 7 days   Lab Units 12/15/22  0034 12/14/22  0027 12/13/22  1210 12/11/22  0550 12/11/22  0042   INR   --   --   --   --  1.08   APTT seconds 27.2* 26.2* 35.2*   < > 25.9*    < > = values in this interval not displayed.               Meds:   SCHEDULE  amiodarone, 200 mg, Oral, Q12H  aspirin, 81 mg, Oral, Daily  buPROPion SR, 100 mg, Oral, Daily  ferrous sulfate, 324 mg, Oral, Daily With Breakfast  FLUoxetine, 40 mg, Oral, Daily  guaiFENesin, 600 mg, Oral, Daily  ipratropium-albuterol, 3 mL, Nebulization, 4x Daily - RT  pantoprazole, 40 mg, Oral, BID  predniSONE, 40 mg, Oral, Daily With Breakfast  pregabalin, 75 mg, Oral, BID  ranolazine, 1,000 mg, Oral, BID  rivaroxaban, 20 mg, Oral, Daily With Dinner  rosuvastatin, 20 mg, Oral, Nightly  sodium chloride, 10 mL, Intravenous, Q12H  sulfamethoxazole-trimethoprim, 1 tablet, Oral, Q12H  tamsulosin, 0.4 mg, Oral, Daily  vitamin B-12, 1,000 mcg, Oral, Daily      Infusions  sodium chloride, 30 mL/hr, Last Rate: Stopped (12/13/22 0815)      PRNs  •  acetaminophen  •  albuterol  •  docusate sodium  •  HYDROcodone-acetaminophen  •  Morphine  •  ondansetron  •  sodium chloride  •  sodium chloride    Physical Exam:  Physical Exam  Vitals reviewed.   Pulmonary:      Effort: Pulmonary effort is normal.      Breath sounds: Wheezing and rhonchi present.   Skin:     General: Skin is warm and dry.   Neurological:      Mental Status: He is alert and oriented to person, place, and time.         ROS  Review of Systems   Respiratory: Positive for cough and shortness of breath.        I have reviewed the patient's new clinical results.    Electronically signed by Vera Marlow MD    Electronically signed by Vera Marlow MD at 12/15/22 1404     Delvin Da Silva MD at 12/14/22 1447               [facility] Daily Progress Note    Patient Name: Fabian Manjarrez  : 1955  MRN: [id number]  Primary Care Physician:  Addison Burdick MD  Date of admission: 12/10/2022      Subjective       Chief Complaint: Shortness of breath.      Patient Reports:        2022.  Patient was seen and examined.  Patient reported no new symptoms.  Patient requested a diet.      2022.  Patient was seen and examined.  Patient reported significant improvement in his symptoms.  Patient is status post cardioversion.      2022.  Patient was seen and examined.  Patient requested a diet similar to his his diet at home.  Patient reported no overnight events.      Review of Systems   Constitutional: Positive for malaise/fatigue.   HENT: Negative.    Eyes: Negative.    Cardiovascular: Negative for palpitations.   Respiratory: Negative for shortness of breath.    Endocrine: Negative.    Hematologic/Lymphatic: Negative.    Skin: Negative.    Musculoskeletal: Negative.    Gastrointestinal: Negative.    Genitourinary: Negative.    Neurological: Positive for weakness.   Psychiatric/Behavioral: Negative.    Allergic/Immunologic: Negative.             Objective       Vitals:   Temp:  [97.4 °F (36.3 °C)-97.9 °F (36.6 °C)] 97.6 °F (36.4 °C)  Heart Rate:  [70-78] 73  Resp:  [12-23] 23  BP: (115-137)/(55-73) 115/67  Flow (L/min):  [3-5] 3    Physical Exam  Vitals reviewed.   Constitutional:       Comments: Patient is lying comfortably in bed and in no acute distress.  Family is at the bedside.   HENT:      Head: Normocephalic.      Nose: Nose normal.      Mouth/Throat:      Mouth: Mucous membranes are dry.      Pharynx: Oropharynx is clear.   Eyes:      Extraocular Movements: Extraocular movements intact.      Conjunctiva/sclera: Conjunctivae normal.      Pupils: Pupils are equal, round, and reactive to light.   Cardiovascular:      Pulses: Normal pulses.      Heart sounds: No  murmur heard.    No friction rub. No gallop.      Comments: S1 and S2 present.  No tachycardia.  Pulmonary:      Effort: Pulmonary effort is normal.      Breath sounds: No stridor. No wheezing or rales.   Chest:      Chest wall: No tenderness.   Abdominal:      General: Bowel sounds are normal. There is no distension.      Palpations: Abdomen is soft. There is no mass.      Tenderness: There is no abdominal tenderness. There is no right CVA tenderness, left CVA tenderness, guarding or rebound.      Hernia: No hernia is present.   Musculoskeletal:         General: No swelling, tenderness, deformity or signs of injury.      Cervical back: Normal range of motion. No rigidity.      Right lower leg: No edema.      Left lower leg: No edema.   Skin:     General: Skin is warm and dry.      Capillary Refill: Capillary refill takes less than 2 seconds.      Coloration: Skin is not jaundiced.      Findings: No bruising, erythema, lesion or rash.   Neurological:      Comments: No facial asymmetry noted.  Gait and station not tested.   Psychiatric:      Comments: No agitation.               Result Review    Result Review:  I have personally reviewed the results from the time of this admission to 12/14/2022 14:46 EST and agree with these findings:  []  Laboratory  []  Microbiology  []  Radiology  []  EKG/Telemetry   []  Cardiology/Vascular   []  Pathology  []  Old records  []  Other:  Most notable findings include:          Assessment & Plan    From previous notes and with minor updates.    Brief Patient Summary:    Patient is a 67 y.o. male with past medical history of GERD, COPD, hypertension, hyperlipidemia, osteoarthritis, chronic CAD who presented to [facility] on 12/10/2022 complaining of above.   Notified of transfer arrival to PCU unit by nursing staff.  Patient was accepted for transfer from [facility] ED earlier today by Dr. Avelar for A. fib with RVR and acute on chronic hypoxic respiratory  failure with possible pulmonary edema.  History is somewhat challenging though he is alert oriented x3 and able to provide history, he sometimes gets lost in the information that he provides and what timing of events.  He has extensive past medical history-see assessment/plan for details.  Per available outside records the following was found.  He was admitted to [facility] between 12/7/2022 through 12/9/2022 and managed for acute COPD exacerbation, possible atypical pneumonia, and WOLF related to obstructive uropathy.  He was not on supplemental oxygen prior to this time though was discharged with 2 L nasal cannula and a course of empiric antibiotics.  VQ scan during that hospitalization was low probability of PE.  He had mild WOLF with a creatinine of 2. This improved to discharge creatinine of 1.4 with medical management and after pop cathether placement for obstructive uropathy.  He was able to void after the Pop catheter was removed and he was discharged with Flomax with recommendations to follow-up with urology due to questionable filling defect related to the prostate versus bladder mass measuring 1.5 cm in size.  Outpatient follow-up with urology was recommended  He went home and over the course of 24 hours still had persistent shortness of breath which prompted his presentation again to the emergency room.  At this time he was found to be in A. fib with RVR and was initiated on amiodarone and given IV Lasix 40 mg x 1. Chest x-ray revealed stable diffuse interstitial changes bilaterally with stable patchy multifocal airspace infiltrates. His white blood cell count was 20 hemoglobin was 9.7 platelets were 352.  Sodium was 135 creatinine was 1.2.  Albumin was 3.  BNP was 1095. Troponin I was 0.07.  Influenza panel COVID-19 and RSV were negative.  Transfer to tertiary facility was requested.     On arrival to our facility he is on 6 L nasal cannula.  He denies any active chest pain though  reports 8 out of 10 headache that is more right-sided with some sensitivity to light.  He describes a history of migraine headaches every few weeks and this is similar.  No focal deficits are noted.  Mild nausea as described.  She denies sinus tenderness.  No rhinorrhea.  Endorses generalized myalgias.  Denies hemoptysis.  Denies travel history.  No known sick contacts.  Cardiology initiated heparin drip on arrival.         amiodarone, 200 mg, Oral, Q12H  aspirin, 81 mg, Oral, Daily  buPROPion SR, 100 mg, Oral, Daily  ferrous sulfate, 324 mg, Oral, Daily With Breakfast  FLUoxetine, 40 mg, Oral, Daily  guaiFENesin, 600 mg, Oral, Daily  ipratropium-albuterol, 3 mL, Nebulization, 4x Daily - RT  pantoprazole, 40 mg, Oral, BID  [START ON 12/15/2022] predniSONE, 40 mg, Oral, Daily With Breakfast  pregabalin, 75 mg, Oral, BID  ranolazine, 1,000 mg, Oral, BID  rivaroxaban, 20 mg, Oral, Daily With Dinner  rosuvastatin, 20 mg, Oral, Nightly  sodium chloride, 10 mL, Intravenous, Q12H  sulfamethoxazole-trimethoprim, 1 tablet, Oral, Q12H  tamsulosin, 0.4 mg, Oral, Daily  vitamin B-12, 1,000 mcg, Oral, Daily       sodium chloride, 30 mL/hr, Last Rate: Stopped (12/13/22 0815)         Active Hospital Problems:  Active Hospital Problems    Diagnosis    • **Atrial fibrillation with rapid ventricular response (HCC)    • Acute on chronic respiratory failure with hypercapnia (HCC)    • COPD (chronic obstructive pulmonary disease) (HCC)    • Primary hypertension    • Coronary artery disease involving autologous vein coronary bypass graft without angina pectoris    • GERD without esophagitis    • Primary osteoarthritis involving multiple joints    • Mixed hyperlipidemia          Plan:      -Continue appropriate patient's home medications for other chronic medical conditions.  -Continue the present level of care.  -Patient and family agreed with the plan of care.  -Treat acute on chronic respiratory failure with oxygen therapy.  -Follow  pulmonary recommendations.  -Treat atrial fibrillation with RVR with a rate control strategy.  -Follow cardiology recommendations.  -Treat GERD with Protonix.  -Treated primary osteoarthritis with Tylenol.  -Patient is status post cardioversion.        DVT prophylaxis:  Medical and mechanical DVT prophylaxis orders are present.    CODE STATUS:    Code Status (Patient has no pulse and is not breathing): CPR (Attempt to Resuscitate)  Medical Interventions (Patient has pulse or is breathing): Full Support  Release to patient: Routine Release      Disposition: This wonderful patient can discharge in the next 48 to 72 hours.    This patient has been examined wearing appropriate Personal Protective Equipment and discussed with hospital infection control department, [facility], infectious disease specialist and pulmonologist. 12/14/22      Electronically signed by Delvin Da Silva MD, FACP, 12/14/22, 14:46 EST.      [facility]             Electronically signed by Delvin Da Silva MD at 12/14/22 1450       Consult Notes (last 24 hours)  Notes from 12/14/22 1441 through 12/15/22 1441   No notes of this type exist for this encounter.            Physical Therapy Notes (most recent note)      Vero Quijano, PT at 12/15/22 1320  Version 1 of 1       Goal Outcome Evaluation:      Assessment: Fabian Manjarrez presents with functional mobility impairments which indicate the need for skilled intervention. Tolerating session today without incident. Pt is limited primarily by O2 desaturation with mobility.  He reports that last time he went home, he returned quickly due to SOA.  He may benefit from OP Pulmonary rehab at discharge.  Will continue to follow and progress as tolerated.      Plan/Recommendations:   Low Intensity Therapy recommended post-acute care - This is recommended as therapy feels this patient would require 2-3 visits per week. OP or HH would be the best option depending on  "patient's home bound status. Consider, if the patient has other  \"skilled\" needs such as wounds, IV antibiotics, etc. Combined with \"low intensity\" could also equate to a SNF. If patient is medically complex, consider LTAC.. Pt requires no DME at discharge.      Pt desires Home with Home Health at discharge. Pt cooperative; agreeable to therapeutic recommendations and plan of care.              Electronically signed by Vero Quijano PT at 12/15/22 1320       "

## 2022-12-15 NOTE — PROGRESS NOTES
[facility] Medicine Services Daily Progress Note    Patient Name: Fabian Manjarrez  : 1955  MRN: [id number]  Primary Care Physician:  Addison Burdick MD  Date of admission: 12/10/2022      Subjective      Chief Complaint: Shortness of breath.      Patient Reports:        2022.  Patient was seen and examined.  Patient reported no new symptoms.  Patient requested a diet.      2022.  Patient was seen and examined.  Patient reported significant improvement in his symptoms.  Patient is status post cardioversion.      2022.  Patient was seen and examined.  Patient requested a diet similar to his his diet at home.  Patient reported no overnight events.      12/15/2022.  Patient was seen and examined.  Patient is on 5 L of oxygen via nasal cannula.  We will continue to titrate down.  Patient complained of sore throat and was diagnosed with thrush.  Nystatin was ordered.  Sanford catheter still in place and the urology is following.  This wonderful patient can discharge in the next 24 hours with improvements in the pulmonary function and urogenital function.      Review of Systems   Constitutional: Positive for malaise/fatigue.   HENT: Negative.    Eyes: Negative.    Cardiovascular: Negative for palpitations.   Respiratory: Positive for shortness of breath.    Endocrine: Negative.    Hematologic/Lymphatic: Negative.    Skin: Negative.    Musculoskeletal: Negative.    Gastrointestinal: Negative.    Genitourinary: Negative.    Neurological: Positive for weakness.   Psychiatric/Behavioral: Negative.    Allergic/Immunologic: Negative.             Objective      Vitals:   Temp:  [97.6 °F (36.4 °C)-97.8 °F (36.6 °C)] 97.8 °F (36.6 °C)  Heart Rate:  [70-93] 72  Resp:  [10-23] 12  BP: (106-118)/(52-64) 106/60  Flow (L/min):  [2-5] 4    Physical Exam  Vitals reviewed.   Constitutional:       General: He is not in acute distress.  HENT:      Head: Normocephalic.      Nose: Nose normal.       Mouth/Throat:      Mouth: Mucous membranes are dry.      Pharynx: Oropharynx is clear.   Eyes:      Extraocular Movements: Extraocular movements intact.      Conjunctiva/sclera: Conjunctivae normal.      Pupils: Pupils are equal, round, and reactive to light.   Cardiovascular:      Pulses: Normal pulses.      Heart sounds: No murmur heard.    No friction rub. No gallop.      Comments: S1 and S2 present.  No tachycardia.  Pulmonary:      Breath sounds: No stridor. No wheezing or rales.      Comments: Decreased air entry bilaterally.  Chest:      Chest wall: No tenderness.   Abdominal:      General: Bowel sounds are normal. There is no distension.      Palpations: Abdomen is soft. There is no mass.      Tenderness: There is no abdominal tenderness. There is no right CVA tenderness, left CVA tenderness, guarding or rebound.      Hernia: No hernia is present.   Genitourinary:     Comments: Sanford catheter in place.  Musculoskeletal:         General: No swelling, tenderness, deformity or signs of injury.      Cervical back: Normal range of motion. No rigidity.      Right lower leg: No edema.      Left lower leg: No edema.   Skin:     General: Skin is warm and dry.      Capillary Refill: Capillary refill takes less than 2 seconds.      Coloration: Skin is not jaundiced.      Findings: No bruising, erythema, lesion or rash.   Neurological:      Comments: No facial asymmetry noted.  Gait and station not tested.   Psychiatric:      Comments: No agitation.               Result Review    Result Review:  I have personally reviewed the results from the time of this admission to 12/15/2022 12:36 EST and agree with these findings:  []  Laboratory  []  Microbiology  []  Radiology  []  EKG/Telemetry   []  Cardiology/Vascular   []  Pathology  []  Old records  []  Other:  Most notable findings include:          Assessment & Plan    From previous notes and with minor updates.    Brief Patient Summary:    Patient is a 67 y.o. male  with past medical history of chronic coronary artery disease, GERD, COPD, hypertension, hyperlipidemia, osteoarthritis who presented to [facility] on 12/10/2022 complaining of above.   Notified of transfer arrival to PCU unit by nursing staff.  Patient was accepted for transfer from [facility] ED earlier today by Dr. Avelar for A. fib with RVR and acute on chronic hypoxic respiratory failure with possible pulmonary edema.  History is somewhat challenging though he is alert oriented x3 and able to provide history, he sometimes gets lost in the information that he provides and what timing of events.  He has extensive past medical history-see assessment/plan for details.  Per available outside records the following was found.  He was admitted to [facility] between 12/7/2022 through 12/9/2022 and managed for acute COPD exacerbation, possible atypical pneumonia, and WOLF related to obstructive uropathy.  He was not on supplemental oxygen prior to this time though was discharged with 2 L nasal cannula and a course of empiric antibiotics.  VQ scan during that hospitalization was low probability of PE.  He had mild WOLF with a creatinine of 2. This improved to discharge creatinine of 1.4 with medical management and after pop cathether placement for obstructive uropathy.  He was able to void after the Pop catheter was removed and he was discharged with [facility] with recommendations to follow-up with urology due to questionable filling defect related to the prostate versus bladder mass measuring 1.5 cm in size.  Outpatient follow-up with urology was recommended  He went home and over the course of 24 hours still had persistent shortness of breath which prompted his presentation again to the emergency room.  At this time he was found to be in A. fib with RVR and was initiated on amiodarone and given IV Lasix 40 mg x 1. Chest x-ray revealed stable diffuse interstitial changes bilaterally  with stable patchy multifocal airspace infiltrates. His white blood cell count was 20 hemoglobin was 9.7 platelets were 352.  Sodium was 135 creatinine was 1.2.  Albumin was 3.  BNP was 1095. Troponin I was 0.07.  Influenza panel COVID-19 and RSV were negative.  Transfer to tertiary facility was requested.     On arrival to our facility he is on 6 L nasal cannula.  He denies any active chest pain though reports 8 out of 10 headache that is more right-sided with some sensitivity to light.  He describes a history of migraine headaches every few weeks and this is similar.  No focal deficits are noted.  Mild nausea as described.  She denies sinus tenderness.  No rhinorrhea.  Endorses generalized myalgias.  Denies hemoptysis.  Denies travel history.  No known sick contacts.  Cardiology initiated heparin drip on arrival.         amiodarone, 200 mg, Oral, Q12H  aspirin, 81 mg, Oral, Daily  buPROPion SR, 100 mg, Oral, Daily  ferrous sulfate, 324 mg, Oral, Daily With Breakfast  FLUoxetine, 40 mg, Oral, Daily  guaiFENesin, 600 mg, Oral, Daily  ipratropium-albuterol, 3 mL, Nebulization, 4x Daily - RT  nystatin, 5 mL, Swish & Swallow, 4x Daily  pantoprazole, 40 mg, Oral, BID  predniSONE, 40 mg, Oral, Daily With Breakfast  pregabalin, 75 mg, Oral, BID  ranolazine, 1,000 mg, Oral, BID  rivaroxaban, 20 mg, Oral, Daily With Dinner  rosuvastatin, 20 mg, Oral, Nightly  sodium chloride, 10 mL, Intravenous, Q12H  sulfamethoxazole-trimethoprim, 1 tablet, Oral, Q12H  tamsulosin, 0.4 mg, Oral, Daily  vitamin B-12, 1,000 mcg, Oral, Daily       sodium chloride, 30 mL/hr, Last Rate: Stopped (12/13/22 0815)         Active Hospital Problems:  Active Hospital Problems    Diagnosis    • **Atrial fibrillation with rapid ventricular response (HCC)    • Acute on chronic respiratory failure with hypercapnia (HCC)    • Physical debility    • COPD (chronic obstructive pulmonary disease) (HCC)    • Primary hypertension    • Coronary artery disease  involving autologous vein coronary bypass graft without angina pectoris    • GERD without esophagitis    • Primary osteoarthritis involving multiple joints    • Mixed hyperlipidemia          Plan:      -Continue appropriate patient's home medications for other chronic medical conditions.  -Continue the present level of care.  -Patient and family agreed with the plan of care.  -Treat acute on chronic respiratory failure with oxygen therapy.  -Follow pulmonary recommendations.  -Treat atrial fibrillation with RVR with a rate control strategy.  -Follow cardiology recommendations.  -Treat GERD with Protonix.  -Treated primary osteoarthritis with Tylenol.  -Patient is status post cardioversion.  -Treat physical debility with physical therapy.        DVT prophylaxis:  Medical and mechanical DVT prophylaxis orders are present.    CODE STATUS:    Code Status (Patient has no pulse and is not breathing): CPR (Attempt to Resuscitate)  Medical Interventions (Patient has pulse or is breathing): Full Support  Release to patient: Routine Release      Disposition: This wonderful patient can discharge in the next 48 to 72 hours.    This patient has been examined wearing appropriate Personal Protective Equipment and discussed with hospital infection control department, [facility], infectious disease specialist and pulmonologist. 12/15/22      Electronically signed by Delvin Da Silva MD, FACP, 12/15/22, 12:36 UZMA.      [facility] Hospitalist Team

## 2022-12-16 NOTE — PLAN OF CARE
Goal Outcome Evaluation:  Plan of Care Reviewed With: patient        Progress: no change  Outcome Evaluation: Patient on 4LNC overnight. Continue to monitor

## 2022-12-16 NOTE — PROGRESS NOTES
Daily Progress Note        Atrial fibrillation with rapid ventricular response (HCC)    COPD (chronic obstructive pulmonary disease) (HCC)    Primary hypertension    Coronary artery disease involving autologous vein coronary bypass graft without angina pectoris    GERD without esophagitis    Primary osteoarthritis involving multiple joints    Mixed hyperlipidemia    Acute on chronic respiratory failure with hypercapnia (HCC)    Physical debility      Assessment    Acute respiratory failure with hypoxia  Chronic Obstructive Pulmonary Disease  -recently discharged from [facility] on 2L oxygen      Interstitial Lung Disease:  [facility] chest ct 8/2022 shows ULP with honeycombing and bronchiectasis   -recently treated with tapering dose of prednisone      Rheumatoid arthritis, treated with Leflunomide (Arava) and chronic prednsione  -followed by Dr Lauren     Atrial Fibrillation with RVR  -Status post cardioversion 12/13/2022  SSS s/p pacer     12/11/2022 ECHO  EF 40%  RVSP 22.6 mmHg  -mild AV stenosis  -Right ventricle severely dilated      Hypertension  GERD     Recommendations:    Connective tissue work-up including rheumatoid factor, TOMY, scleroderma panel, and ANCA    Bactrim DS 1 PO BID  prednisone 40 mg daily will start weaning over the next 4 weeks  Patient is immunocompromised on chronic prednisone for RA    Titrate oxygen, currently requiring 4L per NC  -Patient will need a 6-minute walk prior to discharge     Mucinex BID  Bronchodilater  Amiodarone po          LOS: 6 days     Subjective         Objective     Vital signs for last 24 hours:  Vitals:    12/15/22 1955 12/15/22 2118 12/16/22 0123 12/16/22 0523   BP:  120/61 112/59 98/65   BP Location:  Right arm Right arm Right arm   Patient Position:  Lying Lying Lying   Pulse: 73 81 77 73   Resp: 16 14 13 16   Temp:  98 °F (36.7 °C) 98 °F (36.7 °C) 97.6 °F (36.4 °C)   TempSrc:  Oral Oral Oral   SpO2: 98% 96% 93% 94%   Weight:    68.9 kg (151 lb 14.4 oz)    Height:           Intake/Output last 3 shifts:  I/O last 3 completed shifts:  In: 780 [P.O.:780]  Out: 5400 [Urine:5400]  Intake/Output this shift:  No intake/output data recorded.      Radiology  Imaging Results (Last 24 Hours)     Procedure Component Value Units Date/Time    XR Chest 1 View [[id number]] Collected: 12/15/22 1241     Updated: 12/15/22 1246    Narrative:      DATE OF EXAM:  12/15/2022 12:30 PM     PROCEDURE:  XR CHEST 1 VW-     INDICATIONS:  worsening dyspnea, ILD; I48.91-Unspecified atrial fibrillation;  O94-Pfsdnomcm for follow-up examination after completed treatment for  conditions other than malignant neoplasm       COMPARISON:  AP portable chest 2/11/2022, CT chest 12/12/2022. Outside chest x-ray  12/7/2022     TECHNIQUE:   Single radiographic view of the chest was obtained.     FINDINGS:  Improved aeration in the right lower lobe and within the periphery of  the left midlung compared to 12/11/2022. Features of interstitial  pulmonary fibrosis are redemonstrated within both lungs, similar in  appearance to a more remote outside chest x-ray from 12/7/2022. Heart  size is normal with median sternotomy. Benign calcified lymph nodes are  seen in the hilar regions. Benign calcified nodules are scattered within  both lungs. Pacemaker device appears unchanged. No pleural effusion or  pneumothorax or acute osseous abnormalities are identified.       Impression:         1. No acute chest findings. Aeration in both lungs appears improved  compared to the chest x-ray from 12/11/2022.     2. Features of interstitial pulmonary fibrosis, similar to the more  remote 12/7/2022 examination.      Electronically Signed By-Melinda Nunn MD On:12/15/2022 12:44 PM  This report was finalized on [id number] by  Melinda Nunn MD.          Labs:  Results from last 7 days   Lab Units 12/15/22  0034   WBC 10*3/mm3 23.40*   HEMOGLOBIN g/dL 8.9*   HEMATOCRIT % 27.8*   PLATELETS 10*3/mm3 338     Results from last 7  days   Lab Units 12/12/22  0457 12/11/22  0042   SODIUM mmol/L 134* 135*   POTASSIUM mmol/L 4.4 3.9   CHLORIDE mmol/L 98 97*   CO2 mmol/L 24.0 24.0   BUN mg/dL 18 17   CREATININE mg/dL 0.90 1.15   CALCIUM mg/dL 8.4* 8.2*   BILIRUBIN mg/dL  --  0.4   ALK PHOS U/L  --  174*   ALT (SGPT) U/L  --  12   AST (SGOT) U/L  --  27   GLUCOSE mg/dL 159* 92         Results from last 7 days   Lab Units 12/11/22  0042   ALBUMIN g/dL 2.90*     Results from last 7 days   Lab Units 12/12/22  0457 12/11/22  0752 12/11/22  0042   TROPONIN T ng/mL 0.027 0.044* 0.056*             Results from last 7 days   Lab Units 12/16/22  0027 12/15/22  0034 12/14/22  0027 12/11/22  0550 12/11/22  0042   INR   --   --   --   --  1.08   APTT seconds 29.0* 27.2* 26.2*   < > 25.9*    < > = values in this interval not displayed.               Meds:   SCHEDULE  amiodarone, 200 mg, Oral, Q12H  aspirin, 81 mg, Oral, Daily  buPROPion SR, 100 mg, Oral, Daily  ferrous sulfate, 324 mg, Oral, Daily With Breakfast  FLUoxetine, 40 mg, Oral, Daily  guaiFENesin, 600 mg, Oral, Daily  nystatin, 5 mL, Swish & Swallow, 4x Daily  pantoprazole, 40 mg, Oral, BID  predniSONE, 40 mg, Oral, Daily With Breakfast  pregabalin, 75 mg, Oral, BID  ranolazine, 1,000 mg, Oral, BID  rivaroxaban, 20 mg, Oral, Daily With Dinner  rosuvastatin, 20 mg, Oral, Nightly  sodium chloride, 10 mL, Intravenous, Q12H  sulfamethoxazole-trimethoprim, 1 tablet, Oral, Q12H  tamsulosin, 0.4 mg, Oral, Daily  vitamin B-12, 1,000 mcg, Oral, Daily      Infusions  sodium chloride, 30 mL/hr, Last Rate: Stopped (12/13/22 0815)      PRNs  •  acetaminophen  •  albuterol  •  docusate sodium  •  HYDROcodone-acetaminophen  •  ipratropium-albuterol  •  Morphine  •  ondansetron  •  sodium chloride  •  sodium chloride    Physical Exam:  Physical Exam  Vitals reviewed.   Pulmonary:      Effort: Pulmonary effort is normal.      Breath sounds: Wheezing and rhonchi present.   Skin:     General: Skin is warm and dry.    Neurological:      Mental Status: He is alert and oriented to person, place, and time.         ROS  Review of Systems   Respiratory: Positive for cough and shortness of breath.        I have reviewed the patient's new clinical results.    Electronically signed by Vera Marlow MD

## 2022-12-16 NOTE — CASE MANAGEMENT/SOCIAL WORK
Discharge Planning Assessment  [facility]     Patient Name: Fabian Manjarrez  MRN: [id number]  Today's Date: 12/16/2022    Admit Date: 12/10/2022    Plan: D/C Plan: Anticipate home with spouse and [facility] (accepted, order placed). Current home oxygen (2L) with [facility], watch for increased home oxygen needs. Xarelto copay $121/month, pt agreeable.         Discharge Plan     Row Name 12/16/22 1443       Plan    Plan D/C Plan: Anticipate home with spouse and [facility] (accepted, order placed). Current home oxygen (2L) with [facility], watch for increased home oxygen needs. Xarelto copay $121/month, pt agreeable.    Patient/Family in Agreement with Plan yes    Plan Comments CM contacted [facility] ([phone], [place]) who confirms they can accept, but will need d/c summary faxed upon d/c to [phone].  spoke to patient at bedside wearing mask and keeping distance greater than 6 feet and spent less than 15 minutes in room. IMM letter reviewed with patient, verbal consent and declined copy. Walking oximetry needed prior to d/c due to increased O2 needs. Barrier to D/C: 4L O2, pulmonology following.                   Expected Discharge Date and Time     Expected Discharge Date Expected Discharge Time    Dec 18, 2022              Patient Forms     Row Name 12/16/22 1446       Patient Forms    Important Message from Medicare (IMM) Delivered  12/16/22    Delivered to Patient    Method of delivery In person  reviewed with patient, verbal consent and declined copy              Met with patient in room wearing PPE: mask.    Maintained distance greater than six feet and spent less than 15 minutes in the room.      Cherise Patel, SAMMIEN, RN    [address] [address]    Office: [phone]  Fax: [phone]

## 2022-12-16 NOTE — PLAN OF CARE
Problem: Adult Inpatient Plan of Care  Goal: Plan of Care Review  Outcome: Ongoing, Progressing  Flowsheets (Taken 12/16/2022 1515)  Outcome Evaluation: Patient continues on 4L NC no complaints of SOA this shift. VSS anticipate dc home hopefully tomorrow with HH and 02  Goal: Patient-Specific Goal (Individualized)  Outcome: Ongoing, Progressing  Goal: Absence of Hospital-Acquired Illness or Injury  Outcome: Ongoing, Progressing  Intervention: Identify and Manage Fall Risk  Recent Flowsheet Documentation  Taken 12/16/2022 1245 by Linda James, RN  Safety Promotion/Fall Prevention:   nonskid shoes/slippers when out of bed   room organization consistent   safety round/check completed   fall prevention program maintained  Taken 12/16/2022 0825 by Linda James, RN  Safety Promotion/Fall Prevention:   nonskid shoes/slippers when out of bed   room organization consistent   safety round/check completed  Goal: Optimal Comfort and Wellbeing  Outcome: Ongoing, Progressing  Goal: Readiness for Transition of Care  Outcome: Ongoing, Progressing   Goal Outcome Evaluation:              Outcome Evaluation: Patient continues on 4L NC no complaints of SOA this shift. VSS anticipate dc home hopefully tomorrow with HH and 02

## 2022-12-16 NOTE — PROGRESS NOTES
[facility] Medicine Services Daily Progress Note    Patient Name: Fabian Manjarrez  : 1955  MRN: [id number]  Primary Care Physician:  Addison Burdick MD  Date of admission: 12/10/2022      Subjective      Chief Complaint: Shortness of breath.      Patient Reports:        2022.  Patient was seen and examined.  Patient reported no new symptoms.  Patient requested a diet.      2022.  Patient was seen and examined.  Patient reported significant improvement in his symptoms.  Patient is status post cardioversion.      2022.  Patient was seen and examined.  Patient requested a diet similar to his his diet at home.  Patient reported no overnight events.      12/15/2022.  Patient was seen and examined.  Patient is on 5 L of oxygen via nasal cannula.  We will continue to titrate down.  Patient complained of sore throat and was diagnosed with thrush.  Nystatin was ordered.  Sanford catheter still in place and the urology is following.  This wonderful patient can discharge in the next 24 hours with improvements in the pulmonary function and urogenital function.        2022.  Patient was seen and examined.  Patient reported no overnight events.  Patient was noted to be on 4.5 L of oxygen via nasal cannula.      Review of Systems   Constitutional: Negative for malaise/fatigue.   HENT: Negative.    Eyes: Negative.    Cardiovascular: Negative for palpitations.   Respiratory: Positive for shortness of breath.    Endocrine: Negative.    Hematologic/Lymphatic: Negative.    Skin: Negative.    Musculoskeletal: Negative.    Gastrointestinal: Negative.    Genitourinary: Negative.    Neurological: Positive for weakness.   Psychiatric/Behavioral: Negative.    Allergic/Immunologic: Negative.             Objective      Vitals:   Temp:  [97.6 °F (36.4 °C)-98.6 °F (37 °C)] 98.5 °F (36.9 °C)  Heart Rate:  [73-82] 82  Resp:  [11-16] 11  BP: ()/(55-65) 123/63  Flow (L/min):  [3.5-4]  4    Physical Exam  Vitals reviewed.   Constitutional:       Comments: Patient is lying comfortably in bed and in no acute distress.   HENT:      Head: Normocephalic.      Nose: Nose normal.      Mouth/Throat:      Mouth: Mucous membranes are dry.      Pharynx: Oropharynx is clear.   Eyes:      Extraocular Movements: Extraocular movements intact.      Conjunctiva/sclera: Conjunctivae normal.      Pupils: Pupils are equal, round, and reactive to light.   Cardiovascular:      Pulses: Normal pulses.      Heart sounds: No murmur heard.    No friction rub. No gallop.      Comments: S1 and S2 present.  No tachycardia.  Pulmonary:      Breath sounds: No stridor. No wheezing or rales.      Comments: Decreased air entry bilaterally.  Chest:      Chest wall: No tenderness.   Abdominal:      General: Bowel sounds are normal. There is no distension.      Palpations: Abdomen is soft. There is no mass.      Tenderness: There is no abdominal tenderness. There is no right CVA tenderness, left CVA tenderness, guarding or rebound.      Hernia: No hernia is present.   Genitourinary:     Comments: Sanford catheter in place.  Musculoskeletal:         General: No swelling, tenderness, deformity or signs of injury.      Cervical back: Normal range of motion. No rigidity.      Right lower leg: No edema.      Left lower leg: No edema.   Skin:     General: Skin is warm and dry.      Capillary Refill: Capillary refill takes less than 2 seconds.      Coloration: Skin is not jaundiced.      Findings: No bruising, erythema, lesion or rash.   Neurological:      Comments: No facial asymmetry noted.  Gait and station not tested.   Psychiatric:      Comments: No agitation.               Result Review    Result Review:  I have personally reviewed the results from the time of this admission to 12/16/2022 18:49 EST and agree with these findings:  []  Laboratory  []  Microbiology  []  Radiology  []  EKG/Telemetry   []  Cardiology/Vascular   []   Pathology  []  Old records  []  Other:  Most notable findings include:          Assessment & Plan    From previous notes and with minor updates.    Brief Patient Summary:    Patient is a 67 y.o. male with past medical history of hypertension, chronic coronary artery disease, GERD, COPD, hyperlipidemia, osteoarthritis who presented to [facility] on 12/10/2022 complaining of above.   Notified of transfer arrival to PCU unit by nursing staff.  Patient was accepted for transfer from [facility] ED earlier today by Dr. Avelar for A. fib with RVR and acute on chronic hypoxic respiratory failure with possible pulmonary edema.  History is somewhat challenging though he is alert oriented x3 and able to provide history, he sometimes gets lost in the information that he provides and what timing of events.  He has extensive past medical history-see assessment/plan for details.  Per available outside records the following was found.  He was admitted to [facility] between 12/7/2022 through 12/9/2022 and managed for acute COPD exacerbation, possible atypical pneumonia, and WOLF related to obstructive uropathy.  He was not on supplemental oxygen prior to this time though was discharged with 2 L nasal cannula and a course of empiric antibiotics.  VQ scan during that hospitalization was low probability of PE.  He had mild WOLF with a creatinine of 2. This improved to discharge creatinine of 1.4 with medical management and after pop cathether placement for obstructive uropathy.  He was able to void after the Pop catheter was removed and he was discharged with [facility] with recommendations to follow-up with urology due to questionable filling defect related to the prostate versus bladder mass measuring 1.5 cm in size.  Outpatient follow-up with urology was recommended  He went home and over the course of 24 hours still had persistent shortness of breath which prompted his presentation again to the  emergency room.  At this time he was found to be in A. fib with RVR and was initiated on amiodarone and given IV Lasix 40 mg x 1. Chest x-ray revealed stable diffuse interstitial changes bilaterally with stable patchy multifocal airspace infiltrates. His white blood cell count was 20 hemoglobin was 9.7 platelets were 352.  Sodium was 135 creatinine was 1.2.  Albumin was 3.  BNP was 1095. Troponin I was 0.07.  Influenza panel COVID-19 and RSV were negative.  Transfer to tertiary facility was requested.     On arrival to our facility he is on 6 L nasal cannula.  He denies any active chest pain though reports 8 out of 10 headache that is more right-sided with some sensitivity to light.  He describes a history of migraine headaches every few weeks and this is similar.  No focal deficits are noted.  Mild nausea as described.  She denies sinus tenderness.  No rhinorrhea.  Endorses generalized myalgias.  Denies hemoptysis.  Denies travel history.  No known sick contacts.  Cardiology initiated heparin drip on arrival.         amiodarone, 200 mg, Oral, Q12H  aspirin, 81 mg, Oral, Daily  buPROPion SR, 100 mg, Oral, Daily  ferrous sulfate, 324 mg, Oral, Daily With Breakfast  FLUoxetine, 40 mg, Oral, Daily  guaiFENesin, 600 mg, Oral, Daily  nystatin, 5 mL, Swish & Swallow, 4x Daily  pantoprazole, 40 mg, Oral, BID  predniSONE, 40 mg, Oral, Daily With Breakfast  pregabalin, 75 mg, Oral, BID  ranolazine, 1,000 mg, Oral, BID  rivaroxaban, 20 mg, Oral, Daily With Dinner  rosuvastatin, 20 mg, Oral, Nightly  sodium chloride, 10 mL, Intravenous, Q12H  sulfamethoxazole-trimethoprim, 1 tablet, Oral, Q12H  tamsulosin, 0.4 mg, Oral, Daily  vitamin B-12, 1,000 mcg, Oral, Daily       sodium chloride, 30 mL/hr, Last Rate: Stopped (12/13/22 0815)         Active Hospital Problems:  Active Hospital Problems    Diagnosis    • **Atrial fibrillation with rapid ventricular response (HCC)    • Acute on chronic respiratory failure with hypercapnia  ([facility])    • Physical debility    • COPD (chronic obstructive pulmonary disease) ([facility])    • Primary hypertension    • Coronary artery disease involving autologous vein coronary bypass graft without angina pectoris    • GERD without esophagitis    • Primary osteoarthritis involving multiple joints    • Mixed hyperlipidemia          Plan:      -Continue appropriate patient's home medications for other chronic medical conditions.  -Continue the present level of care.  -Patient and family agreed with the plan of care.  -Treat acute on chronic respiratory failure with oxygen therapy.  -Follow pulmonary recommendations.  -Treat atrial fibrillation with RVR with a rate control strategy.  -Follow cardiology recommendations.  -Treat GERD with Protonix.  -Treated primary osteoarthritis with Tylenol.  -Patient is status post cardioversion.  -Treat physical debility with physical therapy.        DVT prophylaxis:  Medical and mechanical DVT prophylaxis orders are present.    CODE STATUS:    Code Status (Patient has no pulse and is not breathing): CPR (Attempt to Resuscitate)  Medical Interventions (Patient has pulse or is breathing): Full Support  Release to patient: Routine Release      Disposition: This wonderful patient can discharge in the next 48 to 72 hours.    This patient has been examined wearing appropriate Personal Protective Equipment and discussed with hospital infection control department, [facility], infectious disease specialist and pulmonologist. 12/16/22      Electronically signed by Delvin Da Silva MD, FACP, 12/16/22, 18:49 UZMA.      [facility] Hospitalist Team

## 2022-12-17 PROBLEM — I50.22 CHRONIC HFREF (HEART FAILURE WITH REDUCED EJECTION FRACTION): Status: ACTIVE | Noted: 2022-01-01

## 2022-12-17 NOTE — DISCHARGE INSTRUCTIONS
Patient was advised to follow-up with his primary care physician who will review his current medications.    Patient was advised to follow-up with his a pulmonologist to reassess his pulmonary function.    Patient was advised to follow-up with his a urologist who will reassess his urogenital function and acute BPH.    Patient was advised to follow-up with his cardiologist to reassess his cardiac function.    Patient was advised to return to the emergency department if he experiences any recurrence of his symptoms.

## 2022-12-17 NOTE — DISCHARGE PLACEMENT REQUEST
"Fabian Manjarrez (67 y.o. Male)     Date of Birth   1955    Social Security Number       Address    [address] [place] [facility]    Home Phone   [phone]    MRN   [id number]       Mandaen   None    Marital Status                               Admission Date   12/10/22    Admission Type   Urgent    Admitting Provider   Delvin Da Silva MD    Attending Provider   Delvin Da Silva MD    Department, Room/Bed   [facility] PROGRESS CARE,        Discharge Date       Discharge Disposition   Home or Self Care    Discharge Destination                               Attending Provider: Delvin Da Silva MD    Allergies: Leflunomide, Methotrexate, Nitroglycerin, Nsaids, Plaquenil [Hydroxychloroquine]    Isolation: None   Infection: None   Code Status: CPR    Ht: 170.2 cm (67\")   Wt: 68.8 kg (151 lb 10.8 oz)    Admission Cmt: None   Principal Problem: Atrial fibrillation with rapid ventricular response (HCC) [I48.91]                 Active Insurance as of 12/10/2022     Primary Coverage     Payor Plan Insurance Group Employer/Plan Group    MEDICARE MEDICARE A & B      Payor Plan Address Payor Plan Phone Number Payor Plan Fax Number Effective Dates    [address] [phone]  2019 - None Entered    [place] [id number]       Subscriber Name Subscriber Birth Date Member ID       FABIAN MANJARREZ 1955 [id number]                 Emergency Contacts          No emergency contacts on file.               Discharge Summary      Delvin Da Silva MD at 22 0910                       [facility] Medicine Services  DISCHARGE SUMMARY    Patient Name: Fabian Manjarrez  : 1955  MRN: [id number]    Date of Admission: 12/10/2022  Discharge Diagnosis: Atrial fibrillation with RVR/acute on chronic respiratory failure with hypercapnia.  Date of Discharge: 2022  Primary Care Physician: Addison Burdick MD      Presenting Problem:   Atrial " fibrillation with rapid ventricular response (HCC) [I48.91]    Active and Resolved Hospital Problems:  Active Hospital Problems    Diagnosis POA   • **Atrial fibrillation with rapid ventricular response (HCC) [I48.91] Yes     Priority: High   • Acute on chronic respiratory failure with hypercapnia (HCC) [J96.22] Yes     Priority: High   • Chronic HFrEF (heart failure with reduced ejection fraction) ([facility]) [I50.22] Yes   • Physical debility [R53.81] Yes   • COPD (chronic obstructive pulmonary disease) ([facility]) [J44.9] Yes   • Primary hypertension [I10] Yes   • Coronary artery disease involving autologous vein coronary bypass graft without angina pectoris [I25.810] Yes   • GERD without esophagitis [K21.9] Yes   • Primary osteoarthritis involving multiple joints [M15.9] Yes   • Mixed hyperlipidemia [E78.2] Yes      Resolved Hospital Problems   No resolved problems to display.         Hospital Course   From previous notes and with minor updates.    Hospital Course:      Patient is a 67 y.o. male with past medical history of hypertension, chronic coronary artery disease, GERD, COPD, hyperlipidemia, osteoarthritis who presented to [facility] on 12/10/2022 complaining of above.   Notified of transfer arrival to PCU unit by nursing staff.  Patient was accepted for transfer from [facility] ED earlier today by Dr. Avelar for A. fib with RVR and acute on chronic hypoxic respiratory failure with possible pulmonary edema.  History is somewhat challenging though he is alert oriented x3 and able to provide history, he sometimes gets lost in the information that he provides and what timing of events.  He has extensive past medical history-see assessment/plan for details.  Per available outside records the following was found.  He was admitted to [facility] between 12/7/2022 through 12/9/2022 and managed for acute COPD exacerbation, possible atypical pneumonia, and WOLF related to obstructive  uropathy.  He was not on supplemental oxygen prior to this time though was discharged with 2 L nasal cannula and a course of empiric antibiotics.  VQ scan during that hospitalization was low probability of PE.  He had mild WOLF with a creatinine of 2. This improved to discharge creatinine of 1.4 with medical management and after pop cathether placement for obstructive uropathy.  He was able to void after the Pop catheter was removed and he was discharged with Flomax with recommendations to follow-up with urology due to questionable filling defect related to the prostate versus bladder mass measuring 1.5 cm in size.  Outpatient follow-up with urology was recommended  He went home and over the course of 24 hours still had persistent shortness of breath which prompted his presentation again to the emergency room.  At this time he was found to be in A. fib with RVR and was initiated on amiodarone and given IV Lasix 40 mg x 1. Chest x-ray revealed stable diffuse interstitial changes bilaterally with stable patchy multifocal airspace infiltrates. His white blood cell count was 20 hemoglobin was 9.7 platelets were 352.  Sodium was 135 creatinine was 1.2.  Albumin was 3.  BNP was 1095. Troponin I was 0.07.  Influenza panel COVID-19 and RSV were negative.  Transfer to tertiary facility was requested.     On arrival to our facility he is on 6 L nasal cannula.  He denies any active chest pain though reports 8 out of 10 headache that is more right-sided with some sensitivity to light.  He describes a history of migraine headaches every few weeks and this is similar.  No focal deficits are noted.  Mild nausea as described.  She denies sinus tenderness.  No rhinorrhea.  Endorses generalized myalgias.  Denies hemoptysis.  Denies travel history.  No known sick contacts.  Cardiology initiated heparin drip on arrival.  Atrial fibrillation with RVR was treated with amiodarone and Xarelto.  Coronary artery disease was treated with  aspirin.  Iron deficiency anemia was treated with ferrous sulfate.  Depression was treated with fluoxetine.  Oral thrush was treated with nystatin.  GERD was treated with Protonix.  Hyperlipidemia was treated with Crestor.  BPH was treated with Flomax.  Urology consult was completed.  Pulmonary consult was completed.  Acute respiratory distress with hypercapnia was treated with oxygen therapy as needed.  Patient reported complete resolution of his symptoms after over 7 days in the hospital and requested to be discharged home.  Patient was advised to take his medications as prescribed.  The discharge medications are as per medication reconciliation list.  Patient was advised to follow-up with his primary care physician within 3 to 5 days of discharge.  Patient was advised to follow-up with his a urologist within 7 days of discharge.  Patient was advised to follow-up with his cardiologist within 14 days of discharge.  Patient was advised to follow-up with his pulmonologist within 7 days of discharge.  Patient was advised to return to the emergency department if he experiences any recurrence of his symptoms.  Patient and family agreed with the plan and patient was discharged in stable condition.      DISCHARGE Follow Up Recommendations for labs and diagnostics:     Patient was advised to follow-up with his primary care physician who will review his current medications.    Patient was advised to follow-up with his cardiologist who will reassess his cardiac function.    Patient was advised to follow-up with his a urologist who will reassess his urogenital system and the BPH.    Patient was advised to follow-up with his pulmonologist to reassess his pulmonary function.          Reasons For Change In Medications and Indications for New Medications:      Day of Discharge     Vital Signs:  Temp:  [98 °F (36.7 °C)-98.6 °F (37 °C)] 98.3 °F (36.8 °C)  Heart Rate:  [72-85] 73  Resp:  [11-16] 16  BP: (105-123)/(54-63)  118/63  Flow (L/min):  [3.5-4] 4    Physical Exam:  Physical Exam  Vitals reviewed.   Constitutional:       General: He is not in acute distress.  HENT:      Head: Normocephalic.      Nose: Nose normal.      Mouth/Throat:      Mouth: Mucous membranes are dry.      Pharynx: Oropharynx is clear.   Eyes:      Extraocular Movements: Extraocular movements intact.      Conjunctiva/sclera: Conjunctivae normal.      Pupils: Pupils are equal, round, and reactive to light.   Cardiovascular:      Pulses: Normal pulses.      Heart sounds: No murmur heard.    No friction rub. No gallop.      Comments: S1 and S2 present.  No tachycardia.  Pulmonary:      Effort: Pulmonary effort is normal.      Breath sounds: No stridor. No wheezing or rales.   Chest:      Chest wall: No tenderness.   Abdominal:      General: Bowel sounds are normal. There is no distension.      Palpations: Abdomen is soft.      Tenderness: There is no abdominal tenderness. There is no right CVA tenderness or guarding.   Musculoskeletal:         General: No swelling, tenderness, deformity or signs of injury.      Cervical back: Normal range of motion. No rigidity.      Right lower leg: No edema.      Left lower leg: No edema.   Skin:     General: Skin is warm and dry.      Capillary Refill: Capillary refill takes less than 2 seconds.      Coloration: Skin is not jaundiced.      Findings: No bruising, erythema, lesion or rash.   Neurological:      Mental Status: He is alert.      Comments: No facial asymmetry noted.  Gait and station not tested.   Psychiatric:      Comments: No agitation.              Pertinent  and/or Most Recent Results     LAB RESULTS:      Lab 12/16/22  2355 12/16/22  0027 12/15/22  0034 12/14/22  0027 12/13/22  1210 12/13/22  0450 12/12/22  2239 12/12/22  1439 12/12/22  0457 12/11/22  0550 12/11/22  0042   WBC 19.00*  --  23.40*  --   --   --  21.90*  --  14.30*  --  11.60*   HEMOGLOBIN 8.9*  --  8.9*  --   --   --  9.7*  --  9.7*  --  8.7*    HEMATOCRIT 27.5*  --  27.8*  --   --   --  30.1*  --  28.8*  --  26.2*   PLATELETS 305  --  338  --   --   --  317  --  266  --  300   NEUTROS ABS 14.25*  --  19.66*  --   --   --  19.49*  --  12.58*  --  8.82*   MCV 96.2  --  95.2  --   --   --  96.0  --  93.7  --  94.3   PROCALCITONIN  --   --   --   --   --   --   --   --   --   --  0.11   PROTIME  --   --   --   --   --   --   --   --   --   --  11.1   APTT 29.7* 29.0* 27.2* 26.2* 35.2*   < > 63.2   < > 44.1*   < > 25.9*    < > = values in this interval not displayed.         Lab 12/17/22 0445 12/12/22 0457 12/11/22 0042   SODIUM 132* 134* 135*   POTASSIUM 4.4 4.4 3.9   CHLORIDE 99 98 97*   CO2 27.0 24.0 24.0   ANION GAP 6.0 12.0 14.0   BUN 22 18 17   CREATININE 1.04 0.90 1.15   EGFR 78.2 93.0 69.3   GLUCOSE 98 159* 92   CALCIUM 8.5* 8.4* 8.2*         Lab 12/17/22  0445 12/11/22 0042   TOTAL PROTEIN 5.1* 5.7*   ALBUMIN 2.70* 2.90*   GLOBULIN 2.4 2.8   ALT (SGPT) 17 12   AST (SGOT) 21 27   BILIRUBIN 0.2 0.4   ALK PHOS 158* 174*         Lab 12/12/22  0457 12/11/22  0752 12/11/22 0042   PROBNP  --   --  8,457.0*   TROPONIN T 0.027 0.044* 0.056*   PROTIME  --   --  11.1   INR  --   --  1.08                 Brief Urine Lab Results     None        Microbiology Results (last 10 days)     Procedure Component Value - Date/Time    Respiratory Culture - Sputum, Cough [[id number]]  (Abnormal) Collected: 12/14/22 2206    Lab Status: Preliminary result Specimen: Sputum from Cough Updated: 12/16/22 1415     Respiratory Culture Light growth (2+) Pseudomonas species      Scant growth (1+) Normal Respiratory Melinda     Gram Stain Moderate (3+) WBCs per low power field      Rare (1+) Epithelial cells per low power field      Rare (1+) Mixed bacterial morphotypes seen on Gram Stain          CT Head Without Contrast    Result Date: 12/11/2022  Impression:  1. No acute intracranial pathology is identified.  Electronically Signed By-Francis Wynn MD On:12/11/2022 9:34 AM This  report was finalized on [id number] by  Francis Wynn MD.    CT Chest Without Contrast Diagnostic    Result Date: 12/12/2022  Impression: 1.     No evidence for acute intrathoracic abnormality. 2.     Extensive changes throughout the lungs most consistent with advanced emphysema/COPD with a component of mild peripheral fibrosis. Other underlying chronic interstitial lung disease or cystic lung disease could be considered. 3.     Granulomatous inflammatory changes are noted with scattered calcified granulomas bilaterally. No dominant suspicious nodule or mass is seen.  Electronically Signed By-Jenaro Timmons MD On:12/12/2022 4:33 PM This report was finalized on [id number] by  Jenaro Timmons MD.    XR Chest 1 View    Result Date: 12/15/2022  Impression:  1. No acute chest findings. Aeration in both lungs appears improved compared to the chest x-ray from 12/11/2022.  2. Features of interstitial pulmonary fibrosis, similar to the more remote 12/7/2022 examination.  Electronically Signed By-Melinda Nunn MD On:12/15/2022 12:44 PM This report was finalized on [id number] by  Melinda Nunn MD.    XR Chest 1 View    Result Date: 12/11/2022  Impression: 1.  Unchanged since radiograph yesterday. Diffuse interstitial opacities and superimposed small nodular opacities again demonstrated. The interstitial opacities are increased from December 7, 2022 exam. The change may represent acute component of interstitial lung disease, superimposed edema, or atypical pneumonia. Electronically signed by:  Roberto Booth M.D.  12/10/2022 11:35 PM Mountain Time              Results for orders placed during the hospital encounter of 12/10/22    Adult Transthoracic Echo Complete W/ Cont if Necessary Per Protocol    Interpretation Summary  •  Left ventricular systolic function is mildly decreased. Estimated left ventricular EF = 40%  •  Left ventricular diastolic dysfunction is noted.  •  Severely reduced right ventricular systolic  function noted.  •  The right ventricular cavity is severely dilated.  •  The right atrial cavity is mild to moderately  dilated.  •  Mild aortic valve stenosis is present.      Labs Pending at Discharge:  Pending Labs     Order Current Status    TOMY by IFA, Reflex 9-biomarkers profile In process    ANCA Panel In process    Scleroderma Diagnostic Profile In process    Respiratory Culture - Sputum, Cough Preliminary result          Procedures Performed           Consults:   Consults     Date and Time Order Name Status Description    12/12/2022  8:06 AM Inpatient Urology Consult Completed     12/10/2022 11:45 PM Inpatient Pulmonology Consult Completed     12/10/2022 11:30 PM Inpatient Cardiology Consult Completed             Discharge Details        Discharge Medications      New Medications      Instructions Start Date   amiodarone 200 MG tablet  Commonly known as: PACERONE   200 mg, Oral, Every 12 Hours Scheduled      lisinopril 5 MG tablet  Commonly known as: PRINIVIL,ZESTRIL   5 mg, Oral, Daily      nystatin 100,000 unit/mL suspension  Commonly known as: MYCOSTATIN   500,000 Units, Swish & Swallow, 4 Times Daily      rivaroxaban 20 MG tablet  Commonly known as: XARELTO   20 mg, Oral, Daily With Dinner      sulfamethoxazole-trimethoprim 800-160 MG per tablet  Commonly known as: BACTRIM DS,SEPTRA DS   1 tablet, Oral, Every 12 Hours Scheduled         Continue These Medications      Instructions Start Date   albuterol sulfate  (90 Base) MCG/ACT inhaler  Commonly known as: PROVENTIL HFA;VENTOLIN HFA;PROAIR HFA   1 puff, Inhalation, Every 4 Hours PRN      aspirin 81 MG EC tablet   81 mg, Oral, Daily      buPROPion  MG 12 hr tablet  Commonly known as: WELLBUTRIN SR   100 mg, Oral, Daily      docusate sodium 100 MG capsule  Commonly known as: COLACE   100 mg, Oral, Daily PRN      ezetimibe 10 MG tablet  Commonly known as: ZETIA   10 mg, Oral, Daily      ferrous sulfate 325 (65 FE) MG tablet   40 mg, Oral,  Daily With Breakfast      FLUoxetine 40 MG capsule  Commonly known as: PROzac   40 mg, Oral, Daily      guaiFENesin 600 MG 12 hr tablet  Commonly known as: MUCINEX   600 mg, Oral, Daily      HYDROcodone-acetaminophen  MG per tablet  Commonly known as: NORCO   1 tablet, Oral, Every 4 Hours PRN      metoprolol tartrate 25 MG tablet  Commonly known as: LOPRESSOR   25 mg, Oral, 2 Times Daily      pantoprazole 40 MG EC tablet  Commonly known as: PROTONIX   40 mg, Oral, 2 Times Daily      predniSONE 5 MG tablet  Commonly known as: DELTASONE   5 mg, Oral, Daily      pregabalin 75 MG capsule  Commonly known as: LYRICA   75 mg, Oral, 2 Times Daily      ranolazine 1000 MG 12 hr tablet  Commonly known as: RANEXA   1,000 mg, Oral, 2 Times Daily      rosuvastatin 20 MG tablet  Commonly known as: CRESTOR   20 mg, Oral, Nightly      tamsulosin 0.4 MG capsule 24 hr capsule  Commonly known as: FLOMAX   1 capsule, Oral, Daily      vitamin B-12 1000 MCG tablet  Commonly known as: CYANOCOBALAMIN   1,000 mcg, Oral, Daily             Allergies   Allergen Reactions   • Leflunomide Diarrhea   • Methotrexate Diarrhea   • Nitroglycerin Headache   • Nsaids GI Bleeding   • Plaquenil [Hydroxychloroquine] Diarrhea     Weight loss         Discharge Disposition: Stable.  Home or Self Care    Diet:  Hospital:  Diet Order   Procedures   • Diet: Regular/House Diet; Texture: Regular Texture (IDDSI 7); Fluid Consistency: Thin (IDDSI 0)         Discharge Activity: As tolerated.        CODE STATUS:  Code Status and Medical Interventions:   Ordered at: 12/11/22 0250     Code Status (Patient has no pulse and is not breathing):    CPR (Attempt to Resuscitate)     Medical Interventions (Patient has pulse or is breathing):    Full Support     Release to patient:    Routine Release         No future appointments.    Additional Instructions for the Follow-ups that You Need to Schedule     Ambulatory Referral to Home Health (Hospital)   As directed       Face to Face Visit Date: 12/15/2022    Follow-up provider for Plan of Care?: I treated the patient in an acute care facility and will not continue treatment after discharge.    Follow-up provider: LEA ABREU [[id number]]    Reason/Clinical Findings: Weakness, A-fib    Describe mobility limitations that make leaving home difficult: weakness, a-fib, respiratiry failure    Nursing/Therapeutic Services Requested: Skilled Nursing Physical Therapy    Skilled nursing orders: Medication education (eval and treat post-hosptialization) Cardiopulmonary assessments    PT orders:  (eval and treat)    Frequency: 1 Week 1               Time spent on Discharge including face to face service: 55 minutes    This patient has been examined wearing appropriate Personal Protective Equipment and discussed with hospital infection control department, [facility], infectious disease specialist and pulmonologist. 12/17/22      Signature: Electronically signed by Delvin Da Silva MD, FACP, 12/17/22, 9:11 AM EST.      Electronically signed by Delvin Da Silva MD at 12/17/22 0925

## 2022-12-17 NOTE — PROGRESS NOTES
Daily Progress Note        Atrial fibrillation with rapid ventricular response (HCC)    COPD (chronic obstructive pulmonary disease) ([facility])    Primary hypertension    Coronary artery disease involving autologous vein coronary bypass graft without angina pectoris    GERD without esophagitis    Primary osteoarthritis involving multiple joints    Mixed hyperlipidemia    Acute on chronic respiratory failure with hypercapnia ([facility])    Physical debility    Chronic HFrEF (heart failure with reduced ejection fraction) ([facility])      Assessment    Acute respiratory failure with hypoxia  Chronic Obstructive Pulmonary Disease  -recently discharged from [facility] on 2L oxygen      Interstitial Lung Disease:  [facility] chest ct 8/2022 shows ULP with honeycombing and bronchiectasis   -recently treated with tapering dose of prednisone      Rheumatoid arthritis, treated with Leflunomide (Arava) and chronic prednsione  -followed by Dr Lauren     Atrial Fibrillation with RVR  -Status post cardioversion 12/13/2022  SSS s/p pacer     12/11/2022 ECHO  EF 40%  RVSP 22.6 mmHg  -mild AV stenosis  -Right ventricle severely dilated      Hypertension  GERD     Recommendations:    Connective tissue work-up including rheumatoid factor, TOMY, scleroderma panel, and ANCApending    Bactrim DS 1 PO BID  prednisone 40 mg daily will start weaning over the next 4 weeks  Patient is immunocompromised on chronic prednisone for RA    Titrate oxygen, currently requiring 4L per NC  -Patient will need a 6-minute walk prior to discharge     Mucinex BID  Bronchodilater  Amiodarone po          LOS: 7 days     Subjective         Objective     Vital signs for last 24 hours:  Vitals:    12/16/22 2122 12/17/22 0224 12/17/22 0849 12/17/22 1052   BP: 109/60 105/54 118/63 100/65   BP Location: Right arm Right arm     Patient Position: Lying Lying     Pulse: 72 85 73 74   Resp: 11 16 12   Temp: 98 °F (36.7 °C) 98.3 °F (36.8 °C)  97.4 °F (36.3 °C)   TempSrc: Oral Oral   Oral   SpO2: 96% 92%  94%   Weight:  68.8 kg (151 lb 10.8 oz)     Height:           Intake/Output last 3 shifts:  I/O last 3 completed shifts:  In: 1200 [P.O.:1200]  Out: 2950 [Urine:2950]  Intake/Output this shift:  No intake/output data recorded.      Radiology  Imaging Results (Last 24 Hours)     ** No results found for the last 24 hours. **          Labs:  Results from last 7 days   Lab Units 12/16/22  2355   WBC 10*3/mm3 19.00*   HEMOGLOBIN g/dL 8.9*   HEMATOCRIT % 27.5*   PLATELETS 10*3/mm3 305     Results from last 7 days   Lab Units 12/17/22  0445   SODIUM mmol/L 132*   POTASSIUM mmol/L 4.4   CHLORIDE mmol/L 99   CO2 mmol/L 27.0   BUN mg/dL 22   CREATININE mg/dL 1.04   CALCIUM mg/dL 8.5*   BILIRUBIN mg/dL 0.2   ALK PHOS U/L 158*   ALT (SGPT) U/L 17   AST (SGOT) U/L 21   GLUCOSE mg/dL 98         Results from last 7 days   Lab Units 12/17/22  0445 12/11/22  0042   ALBUMIN g/dL 2.70* 2.90*     Results from last 7 days   Lab Units 12/12/22  0457 12/11/22  0752 12/11/22  0042   TROPONIN T ng/mL 0.027 0.044* 0.056*             Results from last 7 days   Lab Units 12/16/22  2355 12/16/22  0027 12/15/22  0034 12/11/22  0550 12/11/22  0042   INR   --   --   --   --  1.08   APTT seconds 29.7* 29.0* 27.2*   < > 25.9*    < > = values in this interval not displayed.               Meds:   SCHEDULE  amiodarone, 200 mg, Oral, Q12H  aspirin, 81 mg, Oral, Daily  buPROPion SR, 100 mg, Oral, Daily  ferrous sulfate, 324 mg, Oral, Daily With Breakfast  FLUoxetine, 40 mg, Oral, Daily  guaiFENesin, 600 mg, Oral, Daily  nystatin, 5 mL, Swish & Swallow, 4x Daily  pantoprazole, 40 mg, Oral, BID  predniSONE, 40 mg, Oral, Daily With Breakfast  pregabalin, 75 mg, Oral, BID  ranolazine, 1,000 mg, Oral, BID  rivaroxaban, 20 mg, Oral, Daily With Dinner  rosuvastatin, 20 mg, Oral, Nightly  sodium chloride, 10 mL, Intravenous, Q12H  sulfamethoxazole-trimethoprim, 1 tablet, Oral, Q12H  tamsulosin, 0.4 mg, Oral, Daily  vitamin B-12, 1,000 mcg,  Oral, Daily      Infusions  sodium chloride, 30 mL/hr, Last Rate: Stopped (12/13/22 0815)      PRNs  •  acetaminophen  •  albuterol  •  calcium carbonate  •  docusate sodium  •  HYDROcodone-acetaminophen  •  ipratropium-albuterol  •  Morphine  •  ondansetron  •  sodium chloride  •  sodium chloride    Physical Exam:  Physical Exam  Vitals reviewed.   Pulmonary:      Effort: Pulmonary effort is normal.      Breath sounds: Wheezing and rhonchi present.   Skin:     General: Skin is warm and dry.   Neurological:      Mental Status: He is alert and oriented to person, place, and time.         ROS  Review of Systems   Respiratory: Positive for cough and shortness of breath.        I have reviewed the patient's new clinical results.    Electronically signed by Vera Marlow MD

## 2022-12-17 NOTE — DISCHARGE PLACEMENT REQUEST
"Fabian Manjarrez (67 y.o. Male)     Date of Birth   1955    Social Security Number       Address   [address] [address] [place] [facility]    Home Phone   [phone]    MRN   [id number]       Latter day   None    Marital Status                               Admission Date   12/10/22    Admission Type   Urgent    Admitting Provider   Delvin Da Silva MD    Attending Provider       Department, Room/Bed   [facility], 2117/1       Discharge Date   12/17/2022    Discharge Disposition   Home or Self Care    Discharge Destination                               Attending Provider: (none)   Allergies: Leflunomide, Methotrexate, Nitroglycerin, Nsaids, Plaquenil [Hydroxychloroquine]    Isolation: None   Infection: None   Code Status: Prior    Ht: 170.2 cm (67\")   Wt: 68.8 kg (151 lb 10.8 oz)    Admission Cmt: None   Principal Problem: Atrial fibrillation with rapid ventricular response (HCC) [I48.91]                 Active Insurance as of 12/10/2022     Primary Coverage     Payor Plan Insurance Group Employer/Plan Group    MEDICARE MEDICARE A & B      Payor Plan Address Payor Plan Phone Number Payor Plan Fax Number Effective Dates    [address] [phone]  6/1/2019 - None Entered    [place] [address]       Subscriber Name Subscriber Birth Date Member ID       FABIAN MANJARREZ 1955 [id number]                 Emergency Contacts          No emergency contacts on file.               Respiratory Therapy Notes (last 24 hours)      Sergio Soto, RRT at 12/17/22 1220          Exercise Oximetry    Patient Name:Fabian Manjarrez   MRN: [id number]   Date: 12/17/22             ROOM AIR BASELINE   SpO2% 82 on RA   Heart Rate 74   Blood Pressure      EXERCISE ON ROOM AIR SpO2% EXERCISE ON O2 @ 4LPM SpO2%   1 MINUTE  1 MINUTE 84 @2LPM   2 MINUTES  2 MINUTES 86 @ 3LPM   3 MINUTES  3 MINUTES 87 @3LPM   4 MINUTES  4 MINUTES 87 @3LPM   5 MINUTES  5 MINUTES 89 @4LPM   6 MINUTES  6 MINUTES 92 " @4LPM              Distance Walked  6 minutes Distance Walked   Dyspnea (Saurabh Scale)   Dyspnea (Saurabh Scale)   Fatigue (Saurabh Scale)   Fatigue (Saurabh Scale)   SpO2% Post Exercise  92 SpO2% Post Exercise   HR Post Exercise  80 HR Post Exercise   Time to Recovery   Time to Recovery     Comments: Patient required 4LPM to maintain oxygen saturation during walking oximetry test.    Electronically signed by Sergio Soto RRT at 22 1222          Discharge Summary      Delvin Da Silva MD at 22 0910                       [facility]  DISCHARGE SUMMARY    Patient Name: Fabian Manjarrez  : 1955  MRN: [id number]    Date of Admission: 12/10/2022  Discharge Diagnosis: Atrial fibrillation with RVR/acute on chronic respiratory failure with hypercapnia.  Date of Discharge: 2022  Primary Care Physician: Addison Burdick MD      Presenting Problem:   Atrial fibrillation with rapid ventricular response (HCC) [I48.91]    Active and Resolved Hospital Problems:  Active Hospital Problems    Diagnosis POA   • **Atrial fibrillation with rapid ventricular response (HCC) [I48.91] Yes     Priority: High   • Acute on chronic respiratory failure with hypercapnia (HCC) [J96.22] Yes     Priority: High   • Chronic HFrEF (heart failure with reduced ejection fraction) (HCC) [I50.22] Yes   • Physical debility [R53.81] Yes   • COPD (chronic obstructive pulmonary disease) (HCC) [J44.9] Yes   • Primary hypertension [I10] Yes   • Coronary artery disease involving autologous vein coronary bypass graft without angina pectoris [I25.810] Yes   • GERD without esophagitis [K21.9] Yes   • Primary osteoarthritis involving multiple joints [M15.9] Yes   • Mixed hyperlipidemia [E78.2] Yes      Resolved Hospital Problems   No resolved problems to display.         Hospital Course   From previous notes and with minor updates.    Hospital Course:      Patient is a 67 y.o. male with past medical history of  hypertension, chronic coronary artery disease, GERD, COPD, hyperlipidemia, osteoarthritis who presented to [facility] on 12/10/2022 complaining of above.   Notified of transfer arrival to PCU unit by nursing staff.  Patient was accepted for transfer from [facility] ED earlier today by Dr. Avelar for A. fib with RVR and acute on chronic hypoxic respiratory failure with possible pulmonary edema.  History is somewhat challenging though he is alert oriented x3 and able to provide history, he sometimes gets lost in the information that he provides and what timing of events.  He has extensive past medical history-see assessment/plan for details.  Per available outside records the following was found.  He was admitted to [facility] between 12/7/2022 through 12/9/2022 and managed for acute COPD exacerbation, possible atypical pneumonia, and WOLF related to obstructive uropathy.  He was not on supplemental oxygen prior to this time though was discharged with 2 L nasal cannula and a course of empiric antibiotics.  VQ scan during that hospitalization was low probability of PE.  He had mild WOLF with a creatinine of 2. This improved to discharge creatinine of 1.4 with medical management and after pop cathether placement for obstructive uropathy.  He was able to void after the Pop catheter was removed and he was discharged with [facility] with recommendations to follow-up with urology due to questionable filling defect related to the prostate versus bladder mass measuring 1.5 cm in size.  Outpatient follow-up with urology was recommended  He went home and over the course of 24 hours still had persistent shortness of breath which prompted his presentation again to the emergency room.  At this time he was found to be in A. fib with RVR and was initiated on amiodarone and given IV Lasix 40 mg x 1. Chest x-ray revealed stable diffuse interstitial changes bilaterally with stable patchy multifocal  airspace infiltrates. His white blood cell count was 20 hemoglobin was 9.7 platelets were 352.  Sodium was 135 creatinine was 1.2.  Albumin was 3.  BNP was 1095. Troponin I was 0.07.  Influenza panel COVID-19 and RSV were negative.  Transfer to tertiary facility was requested.     On arrival to our facility he is on 6 L nasal cannula.  He denies any active chest pain though reports 8 out of 10 headache that is more right-sided with some sensitivity to light.  He describes a history of migraine headaches every few weeks and this is similar.  No focal deficits are noted.  Mild nausea as described.  She denies sinus tenderness.  No rhinorrhea.  Endorses generalized myalgias.  Denies hemoptysis.  Denies travel history.  No known sick contacts.  Cardiology initiated heparin drip on arrival.  Atrial fibrillation with RVR was treated with amiodarone and Xarelto.  Coronary artery disease was treated with aspirin.  Iron deficiency anemia was treated with ferrous sulfate.  Depression was treated with fluoxetine.  Oral thrush was treated with nystatin.  GERD was treated with Protonix.  Hyperlipidemia was treated with Crestor.  BPH was treated with Flomax.  Urology consult was completed.  Pulmonary consult was completed.  Acute respiratory distress with hypercapnia was treated with oxygen therapy as needed.  Patient reported complete resolution of his symptoms after over 7 days in the hospital and requested to be discharged home.  Patient was advised to take his medications as prescribed.  The discharge medications are as per medication reconciliation list.  Patient was advised to follow-up with his primary care physician within 3 to 5 days of discharge.  Patient was advised to follow-up with his a urologist within 7 days of discharge.  Patient was advised to follow-up with his cardiologist within 14 days of discharge.  Patient was advised to follow-up with his pulmonologist within 7 days of discharge.  Patient was advised to  return to the emergency department if he experiences any recurrence of his symptoms.  Patient and family agreed with the plan and patient was discharged in stable condition.      DISCHARGE Follow Up Recommendations for labs and diagnostics:     Patient was advised to follow-up with his primary care physician who will review his current medications.    Patient was advised to follow-up with his cardiologist who will reassess his cardiac function.    Patient was advised to follow-up with his a urologist who will reassess his urogenital system and the BPH.    Patient was advised to follow-up with his pulmonologist to reassess his pulmonary function.          Reasons For Change In Medications and Indications for New Medications:      Day of Discharge     Vital Signs:  Temp:  [98 °F (36.7 °C)-98.6 °F (37 °C)] 98.3 °F (36.8 °C)  Heart Rate:  [72-85] 73  Resp:  [11-16] 16  BP: (105-123)/(54-63) 118/63  Flow (L/min):  [3.5-4] 4    Physical Exam:  Physical Exam  Vitals reviewed.   Constitutional:       General: He is not in acute distress.  HENT:      Head: Normocephalic.      Nose: Nose normal.      Mouth/Throat:      Mouth: Mucous membranes are dry.      Pharynx: Oropharynx is clear.   Eyes:      Extraocular Movements: Extraocular movements intact.      Conjunctiva/sclera: Conjunctivae normal.      Pupils: Pupils are equal, round, and reactive to light.   Cardiovascular:      Pulses: Normal pulses.      Heart sounds: No murmur heard.    No friction rub. No gallop.      Comments: S1 and S2 present.  No tachycardia.  Pulmonary:      Effort: Pulmonary effort is normal.      Breath sounds: No stridor. No wheezing or rales.   Chest:      Chest wall: No tenderness.   Abdominal:      General: Bowel sounds are normal. There is no distension.      Palpations: Abdomen is soft.      Tenderness: There is no abdominal tenderness. There is no right CVA tenderness or guarding.   Musculoskeletal:         General: No swelling, tenderness,  deformity or signs of injury.      Cervical back: Normal range of motion. No rigidity.      Right lower leg: No edema.      Left lower leg: No edema.   Skin:     General: Skin is warm and dry.      Capillary Refill: Capillary refill takes less than 2 seconds.      Coloration: Skin is not jaundiced.      Findings: No bruising, erythema, lesion or rash.   Neurological:      Mental Status: He is alert.      Comments: No facial asymmetry noted.  Gait and station not tested.   Psychiatric:      Comments: No agitation.              Pertinent  and/or Most Recent Results     LAB RESULTS:      Lab 12/16/22  2355 12/16/22  0027 12/15/22  0034 12/14/22  0027 12/13/22  1210 12/13/22  0450 12/12/22  2239 12/12/22  1439 12/12/22  0457 12/11/22  0550 12/11/22  0042   WBC 19.00*  --  23.40*  --   --   --  21.90*  --  14.30*  --  11.60*   HEMOGLOBIN 8.9*  --  8.9*  --   --   --  9.7*  --  9.7*  --  8.7*   HEMATOCRIT 27.5*  --  27.8*  --   --   --  30.1*  --  28.8*  --  26.2*   PLATELETS 305  --  338  --   --   --  317  --  266  --  300   NEUTROS ABS 14.25*  --  19.66*  --   --   --  19.49*  --  12.58*  --  8.82*   MCV 96.2  --  95.2  --   --   --  96.0  --  93.7  --  94.3   PROCALCITONIN  --   --   --   --   --   --   --   --   --   --  0.11   PROTIME  --   --   --   --   --   --   --   --   --   --  11.1   APTT 29.7* 29.0* 27.2* 26.2* 35.2*   < > 63.2   < > 44.1*   < > 25.9*    < > = values in this interval not displayed.         Lab 12/17/22  0445 12/12/22 0457 12/11/22 0042   SODIUM 132* 134* 135*   POTASSIUM 4.4 4.4 3.9   CHLORIDE 99 98 97*   CO2 27.0 24.0 24.0   ANION GAP 6.0 12.0 14.0   BUN 22 18 17   CREATININE 1.04 0.90 1.15   EGFR 78.2 93.0 69.3   GLUCOSE 98 159* 92   CALCIUM 8.5* 8.4* 8.2*         Lab 12/17/22  0445 12/11/22  0042   TOTAL PROTEIN 5.1* 5.7*   ALBUMIN 2.70* 2.90*   GLOBULIN 2.4 2.8   ALT (SGPT) 17 12   AST (SGOT) 21 27   BILIRUBIN 0.2 0.4   ALK PHOS 158* 174*         Lab 12/12/22  0457 12/11/22  0752  12/11/22 0042   PROBNP  --   --  8,457.0*   TROPONIN T 0.027 0.044* 0.056*   PROTIME  --   --  11.1   INR  --   --  1.08                 Brief Urine Lab Results     None        Microbiology Results (last 10 days)     Procedure Component Value - Date/Time    Respiratory Culture - Sputum, Cough [[id number]]  (Abnormal) Collected: 12/14/22 2206    Lab Status: Preliminary result Specimen: Sputum from Cough Updated: 12/16/22 1415     Respiratory Culture Light growth (2+) Pseudomonas species      Scant growth (1+) Normal Respiratory Melinda     Gram Stain Moderate (3+) WBCs per low power field      Rare (1+) Epithelial cells per low power field      Rare (1+) Mixed bacterial morphotypes seen on Gram Stain          CT Head Without Contrast    Result Date: 12/11/2022  Impression:  1. No acute intracranial pathology is identified.  Electronically Signed By-Francis Wynn MD On:12/11/2022 9:34 AM This report was finalized on [id number] by  Francis Wynn MD.    CT Chest Without Contrast Diagnostic    Result Date: 12/12/2022  Impression: 1.     No evidence for acute intrathoracic abnormality. 2.     Extensive changes throughout the lungs most consistent with advanced emphysema/COPD with a component of mild peripheral fibrosis. Other underlying chronic interstitial lung disease or cystic lung disease could be considered. 3.     Granulomatous inflammatory changes are noted with scattered calcified granulomas bilaterally. No dominant suspicious nodule or mass is seen.  Electronically Signed By-Jenaro Timmons MD On:12/12/2022 4:33 PM This report was finalized on [id number] by  Jenaro Timmons MD.    XR Chest 1 View    Result Date: 12/15/2022  Impression:  1. No acute chest findings. Aeration in both lungs appears improved compared to the chest x-ray from 12/11/2022.  2. Features of interstitial pulmonary fibrosis, similar to the more remote 12/7/2022 examination.  Electronically Signed By-Melinda Nunn MD On:12/15/2022 12:44  PM This report was finalized on [id number] by  Melinda Nunn MD.    XR Chest 1 View    Result Date: 12/11/2022  Impression: 1.  Unchanged since radiograph yesterday. Diffuse interstitial opacities and superimposed small nodular opacities again demonstrated. The interstitial opacities are increased from December 7, 2022 exam. The change may represent acute component of interstitial lung disease, superimposed edema, or atypical pneumonia. Electronically signed by:  Roberto Booth M.D.  12/10/2022 11:35 PM Mountain Time              Results for orders placed during the hospital encounter of 12/10/22    Adult Transthoracic Echo Complete W/ Cont if Necessary Per Protocol    Interpretation Summary  •  Left ventricular systolic function is mildly decreased. Estimated left ventricular EF = 40%  •  Left ventricular diastolic dysfunction is noted.  •  Severely reduced right ventricular systolic function noted.  •  The right ventricular cavity is severely dilated.  •  The right atrial cavity is mild to moderately  dilated.  •  Mild aortic valve stenosis is present.      Labs Pending at Discharge:  Pending Labs     Order Current Status    TOMY by IFA, Reflex 9-biomarkers profile In process    ANCA Panel In process    Scleroderma Diagnostic Profile In process    Respiratory Culture - Sputum, Cough Preliminary result          Procedures Performed           Consults:   Consults     Date and Time Order Name Status Description    12/12/2022  8:06 AM Inpatient Urology Consult Completed     12/10/2022 11:45 PM Inpatient Pulmonology Consult Completed     12/10/2022 11:30 PM Inpatient Cardiology Consult Completed             Discharge Details        Discharge Medications      New Medications      Instructions Start Date   amiodarone 200 MG tablet  Commonly known as: PACERONE   200 mg, Oral, Every 12 Hours Scheduled      lisinopril 5 MG tablet  Commonly known as: PRINIVIL,ZESTRIL   5 mg, Oral, Daily      nystatin 100,000 unit/mL  suspension  Commonly known as: MYCOSTATIN   500,000 Units, Swish & Swallow, 4 Times Daily      rivaroxaban 20 MG tablet  Commonly known as: XARELTO   20 mg, Oral, Daily With Dinner      sulfamethoxazole-trimethoprim 800-160 MG per tablet  Commonly known as: BACTRIM DS,SEPTRA DS   1 tablet, Oral, Every 12 Hours Scheduled         Continue These Medications      Instructions Start Date   albuterol sulfate  (90 Base) MCG/ACT inhaler  Commonly known as: PROVENTIL HFA;VENTOLIN HFA;PROAIR HFA   1 puff, Inhalation, Every 4 Hours PRN      aspirin 81 MG EC tablet   81 mg, Oral, Daily      buPROPion  MG 12 hr tablet  Commonly known as: WELLBUTRIN SR   100 mg, Oral, Daily      docusate sodium 100 MG capsule  Commonly known as: COLACE   100 mg, Oral, Daily PRN      ezetimibe 10 MG tablet  Commonly known as: ZETIA   10 mg, Oral, Daily      ferrous sulfate 325 (65 FE) MG tablet   40 mg, Oral, Daily With Breakfast      FLUoxetine 40 MG capsule  Commonly known as: PROzac   40 mg, Oral, Daily      guaiFENesin 600 MG 12 hr tablet  Commonly known as: MUCINEX   600 mg, Oral, Daily      HYDROcodone-acetaminophen  MG per tablet  Commonly known as: NORCO   1 tablet, Oral, Every 4 Hours PRN      metoprolol tartrate 25 MG tablet  Commonly known as: LOPRESSOR   25 mg, Oral, 2 Times Daily      pantoprazole 40 MG EC tablet  Commonly known as: PROTONIX   40 mg, Oral, 2 Times Daily      predniSONE 5 MG tablet  Commonly known as: DELTASONE   5 mg, Oral, Daily      pregabalin 75 MG capsule  Commonly known as: LYRICA   75 mg, Oral, 2 Times Daily      ranolazine 1000 MG 12 hr tablet  Commonly known as: RANEXA   1,000 mg, Oral, 2 Times Daily      rosuvastatin 20 MG tablet  Commonly known as: CRESTOR   20 mg, Oral, Nightly      tamsulosin 0.4 MG capsule 24 hr capsule  Commonly known as: FLOMAX   1 capsule, Oral, Daily      vitamin B-12 1000 MCG tablet  Commonly known as: CYANOCOBALAMIN   1,000 mcg, Oral, Daily             Allergies    Allergen Reactions   • Leflunomide Diarrhea   • Methotrexate Diarrhea   • Nitroglycerin Headache   • Nsaids GI Bleeding   • Plaquenil [Hydroxychloroquine] Diarrhea     Weight loss         Discharge Disposition: Stable.  Home or Self Care    Diet:  Hospital:  Diet Order   Procedures   • Diet: Regular/House Diet; Texture: Regular Texture (IDDSI 7); Fluid Consistency: Thin (IDDSI 0)         Discharge Activity: As tolerated.        CODE STATUS:  Code Status and Medical Interventions:   Ordered at: 12/11/22 0250     Code Status (Patient has no pulse and is not breathing):    CPR (Attempt to Resuscitate)     Medical Interventions (Patient has pulse or is breathing):    Full Support     Release to patient:    Routine Release         No future appointments.    Additional Instructions for the Follow-ups that You Need to Schedule     Ambulatory Referral to Home Health ([facility])   As directed      Face to Face Visit Date: 12/15/2022    Follow-up provider for Plan of Care?: I treated the patient in an acute care facility and will not continue treatment after discharge.    Follow-up provider: LEA ABREU [[id number]]    Reason/Clinical Findings: Weakness, A-fib    Describe mobility limitations that make leaving home difficult: weakness, a-fib, respiratiry failure    Nursing/Therapeutic Services Requested: Skilled Nursing Physical Therapy    Skilled nursing orders: Medication education (eval and treat post-hosptialization) Cardiopulmonary assessments    PT orders:  (eval and treat)    Frequency: 1 Week 1               Time spent on Discharge including face to face service: 55 minutes    This patient has been examined wearing appropriate Personal Protective Equipment and discussed with hospital infection control department, [facility] department, infectious disease specialist and pulmonologist. 12/17/22      Signature: Electronically signed by Delvin Da Silva MD, FACP, 12/17/22, 9:11 AM EST.      Electronically signed  by Delvin Da Silva MD at 12/17/22 0913

## 2022-12-17 NOTE — PROGRESS NOTES
Exercise Oximetry    Patient Name:Fabian Manjarrez   MRN: [id number]   Date: 12/17/22             ROOM AIR BASELINE   SpO2% 82 on RA   Heart Rate 74   Blood Pressure      EXERCISE ON ROOM AIR SpO2% EXERCISE ON O2 @ 4LPM SpO2%   1 MINUTE  1 MINUTE 84 @2LPM   2 MINUTES  2 MINUTES 86 @ 3LPM   3 MINUTES  3 MINUTES 87 @3LPM   4 MINUTES  4 MINUTES 87 @3LPM   5 MINUTES  5 MINUTES 89 @4LPM   6 MINUTES  6 MINUTES 92 @4LPM              Distance Walked  6 minutes Distance Walked   Dyspnea (Saurabh Scale)   Dyspnea (Saurabh Scale)   Fatigue (Saurabh Scale)   Fatigue (Saurabh Scale)   SpO2% Post Exercise  92 SpO2% Post Exercise   HR Post Exercise  80 HR Post Exercise   Time to Recovery   Time to Recovery     Comments: Patient required 4LPM to maintain oxygen saturation during walking oximetry test.

## 2022-12-18 NOTE — OUTREACH NOTE
Prep Survey    Flowsheet Row Responses   [facility] facility patient discharged from? [facility]   Is LACE score < 7 ? No   Eligibility Readm Mgmt   Discharge diagnosis Atrial fibrillation with rapid ventricular response   Does the patient have one of the following disease processes/diagnoses(primary or secondary)? CHF   Does the patient have Home health ordered? Yes   What is the Home health agency?  [facility]   Is there a DME ordered? No   Medication alerts for this patient see AVS   Prep survey completed? Yes          KD BECKHAM - Registered Nurse

## 2022-12-19 NOTE — CASE MANAGEMENT/SOCIAL WORK
Case Management Discharge Note      Final Note: [facility]. DC Summary faxed by previous CM.    Selected Continued Care - Discharged on 12/17/2022 Admission date: 12/10/2022 - Discharge disposition: Home or Self Care        Home Medical Care Coordination complete.    Service Provider Selected Services Address Phone Fax Patient Preferred    [facility] [address] [address] [place] [address] [phone] -- --                  Transportation Services  Private: Car    Final Discharge Disposition Code: 06 - home with home health care

## 2022-12-20 NOTE — OUTREACH NOTE
CHF Week 1 Survey    Flowsheet Row Responses   [facility] patient discharged from? [facility]   Does the patient have one of the following disease processes/diagnoses(primary or secondary)? CHF   CHF Week 1 attempt successful? Yes   Call start time 1325   Call end time 1332   Discharge diagnosis Atrial fibrillation with rapid ventricular response   Meds reviewed with patient/caregiver? Yes   Is the patient having any side effects they believe may be caused by any medication additions or changes? No   Does the patient have all medications ordered at discharge? Yes   Is the patient taking all medications as directed (includes completed medication regime)? Yes   Does the patient have a primary care provider?  Yes   Does the patient have an appointment with their PCP within 7 days of discharge? Yes   What is preventing the patient from scheduling follow up appointments within 7 days of discharge? Earlier appointment not available   Nursing Interventions Urgent appointment needed   Has the patient kept scheduled appointments due by today? N/A   Comments 12/21/22 @ 230 pm for PCP   What is the Home health agency?  [facility]   Has home health visited the patient within 72 hours of discharge? Call prior to 72 hours   Pulse Ox monitoring None   Psychosocial issues? No   Did the patient receive a copy of their discharge instructions? Yes   Nursing interventions Reviewed instructions with patient   What is the patient's perception of their health status since discharge? Improving   Nursing interventions Nurse provided patient education   Is the patient able to teach back signs and symptoms of worsening condition? (i.e. weight gain, shortness of air, etc.) Yes   If the patient is a current smoker, are they able to teach back resources for cessation? Not a smoker   Is the patient/caregiver able to teach back the hierarchy of who to call/visit for symptoms/problems? PCP, Specialist, Home health nurse, Urgent Care, ED,  911 Yes   Additional teach back comments Encouraged daily weights, states he can't breathe very well and will see the MD tomorrow.   Is the patient able to teach back Heart Failure Zones? Yes   CHF Nursing Interventions Education provided on various zones   CHF Zone this Call Yellow Zone   Yellow Zone Not feeling as good as usual, Worsening shortness of breath with activity   Yellow Zone Interventions Consider contacting your doctor or health care team    CHF Week 1 call completed? Yes   Is the patient interested in additional calls from an ambulatory ?  NOTE:  applies to high risk patients requiring additional follow-up. No   Would this patient benefit from a Referral to [facility] Social Work? No   Wrap up additional comments He needs the f/u appt, states he doesn't feel like he is back to his baseline for COPD   Call end time 0804          SHEN BECKHAM - Registered Nurse

## 2022-12-23 PROBLEM — J18.9 HCAP (HEALTHCARE-ASSOCIATED PNEUMONIA): Status: ACTIVE | Noted: 2022-01-01

## 2022-12-23 PROBLEM — R53.1 GENERALIZED WEAKNESS: Status: ACTIVE | Noted: 2022-01-01

## 2022-12-23 PROBLEM — J84.9 ILD (INTERSTITIAL LUNG DISEASE): Status: ACTIVE | Noted: 2022-01-01

## 2022-12-23 NOTE — THERAPY EVALUATION
Patient Name: Fabian Manjarrez  : 1955    MRN: [id number]                              Today's Date: 2022       Admit Date: 2022    Visit Dx: No diagnosis found.  Patient Active Problem List   Diagnosis   • Atrial fibrillation with rapid ventricular response (HCC)   • COPD (chronic obstructive pulmonary disease) (HCC)   • Primary hypertension   • Coronary artery disease involving autologous vein coronary bypass graft without angina pectoris   • GERD without esophagitis   • Primary osteoarthritis involving multiple joints   • Mixed hyperlipidemia   • Acute on chronic respiratory failure with hypercapnia (HCC)   • Physical debility   • Chronic HFrEF (heart failure with reduced ejection fraction) (HCC)   • Generalized weakness     Past Medical History:   Diagnosis Date   • Arthritis    • Asthma    • CHF (congestive heart failure) (HCC)    • COPD (chronic obstructive pulmonary disease) (HCC)    • Coronary artery disease    • Elevated cholesterol    • GERD (gastroesophageal reflux disease)    • History of transfusion    • Hypertension      Past Surgical History:   Procedure Laterality Date   • ABDOMINAL SURGERY     • CARDIAC CATHETERIZATION     • CARDIAC SURGERY     • COLON SURGERY     • COLONOSCOPY     • ENDOSCOPY     • HERNIA REPAIR        General Information     Row Name 22 1426          OT Time and Intention    Document Type evaluation  -SR     Row Name 22 1426          Occupational Profile    Reason for Services/Referral (Occupational Profile) Pt is a 67 y.o. male recently hospitalized with Afib who was sent to ED from  PT with BP of 70/46. PMH includes: RA, COPD, PNA, WOLF w/ obstructive uropathy, SSS w/ permanent pacer, CAD s/p CABG,and intersitial lung disease.  Pt and wife recently moved to area after losing home in [place].  They are living with their daughter while new home is being built.  Wife reports that for the last few days, she has been pushing him on his rollator into  the bathroom as he has been so weak.  -SR     Row Name 12/23/22 1426          Living Environment    People in Home spouse;child(erasmo), adult  -SR     Row Name 12/23/22 1426          Cognition    Orientation Status (Cognition) oriented x 4  -SR           User Key  (r) = Recorded By, (t) = Taken By, (c) = Cosigned By    Initials Name Provider Type    SR Susie Goodson, OT Occupational Therapist                 Mobility/ADL's     Row Name 12/23/22 1429          Bed Mobility    Bed Mobility bed mobility (all) activities  -SR     All Activities, [place] (Bed Mobility) minimum assist (75% patient effort)  -SR     Row Name 12/23/22 1429          Transfers    Transfers sit-stand transfer  -SR     Row Name 12/23/22 1429          Sit-Stand Transfer    Sit-Stand [place] (Transfers) minimum assist (75% patient effort)  -SR     Row Name 12/23/22 1429          Activities of Daily Living    BADL Assessment/Intervention lower body dressing  -SR     Row Name 12/23/22 1429          Lower Body Dressing Assessment/Training    Comment, (Lower Body Dressing) Assist to adjust sock.  Anticipate significant difficulty reaching to feet.  -SR           User Key  (r) = Recorded By, (t) = Taken By, (c) = Cosigned By    Initials Name Provider Type    SR Susie Goodson, OT Occupational Therapist               Obj/Interventions     Row Name 12/23/22 1431          Range of Motion Comprehensive    Comment, General Range of Motion Limited shoulder ROM R>L due to arthritis.  Good distal UE ROM.  -SR     Row Name 12/23/22 1431          Strength Comprehensive (MMT)    Comment, General Manual Muscle Testing (MMT) Assessment R shoulder 2+/5, L shoulder 3/5, elbows 4-/5,  3+/5  -SR     Row Name 12/23/22 1431          Balance    Balance Interventions sitting;standing;sit to stand;supported;static;dynamic;minimal challenge  -SR           User Key  (r) = Recorded By, (t) = Taken By, (c) = Cosigned By    Initials Name Provider Type     Susie Us, OT Occupational Therapist               Goals/Plan     Row Name 12/23/22 1437          Bathing Goal 1 (OT)    Activity/Device (Bathing Goal 1, OT) bathing skills, all  -SR     [place] Level/Cues Needed (Bathing Goal 1, OT) moderate assist (50-74% patient effort)  -SR     Time Frame (Bathing Goal 1, OT) 2 weeks  -SR     Row Name 12/23/22 1437          Dressing Goal 1 (OT)    Activity/Device (Dressing Goal 1, OT) dressing skills, all  -SR     [place]/Cues Needed (Dressing Goal 1, OT) minimum assist (75% or more patient effort)  -SR     Time Frame (Dressing Goal 1, OT) 2 weeks  -SR     Row Name 12/23/22 1437          Therapy Assessment/Plan (OT)    Planned Therapy Interventions (OT) activity tolerance training;BADL retraining;functional balance retraining;occupation/activity based interventions;passive ROM/stretching;ROM/therapeutic exercise;strengthening exercise;transfer/mobility retraining  -SR           User Key  (r) = Recorded By, (t) = Taken By, (c) = Cosigned By    Initials Name Provider Type    SR Susie Goodson, OT Occupational Therapist               Clinical Impression     Row Name 12/23/22 1432          Pain Assessment    Pretreatment Pain Rating 4/10  -SR     Posttreatment Pain Rating 4/10  -SR     Row Name 12/23/22 1433          Plan of Care Review    Outcome Evaluation Pt is a 67 y.o. male recently hospitalized with Afib who was sent to ED from  PT with BP of 70/46. PMH includes: RA, COPD, PNA, WOLF w/ obstructive uropathy, SSS w/ permanent pacer, CAD s/p CABG,and intersitial lung disease. Pt and wife recently moved to [facility] after losing home in [place]. They are living with their daughter while new home is being built. Wife reports that for the last few days, she has been pushing him on his rollator into the bathroom as he has been so weak.  He requires multiple rest breaks due to SOA and O2 sats drop to the low 80s at times on 6L.  He recovers with  PLB.  He was able to stand and take steps toward HOB with standing rest break before taking steps.  Anticipate significant difficulty with all ADLs due to low activity tolerance.  He is not safe to return home at discharge.  Recommend SNF at discharge.  -SR     Row Name 12/23/22 1433          Therapy Assessment/Plan (OT)    Rehab Potential (OT) good, to achieve stated therapy goals  -SR     Criteria for Skilled Therapeutic Interventions Met (OT) yes  -SR     Therapy Frequency (OT) 5 times/wk  -SR     Predicted Duration of Therapy Intervention (OT) Until discharge  -SR     Row Name 12/23/22 1433          Therapy Plan Review/Discharge Plan (OT)    Anticipated Discharge Disposition (OT) skilled nursing facility  -SR     Row Name 12/23/22 1433          Positioning and Restraints    Pre-Treatment Position in bed  -SR     Post Treatment Position bed  -SR     In Bed call light within reach;encouraged to call for assist;exit alarm on  -SR           User Key  (r) = Recorded By, (t) = Taken By, (c) = Cosigned By    Initials Name Provider Type    SR Susie Goodson, OT Occupational Therapist               Outcome Measures     Row Name 12/23/22 1438          How much help from another is currently needed...    Putting on and taking off regular lower body clothing? 1  -SR     Bathing (including washing, rinsing, and drying) 2  -SR     Toileting (which includes using toilet bed pan or urinal) 2  -SR     Putting on and taking off regular upper body clothing 2  -SR     Taking care of personal grooming (such as brushing teeth) 3  -SR     Eating meals 4  -SR     AM-PAC 6 Clicks Score (OT) 14  -SR     Row Name 12/23/22 1408          How much help from another person do you currently need...    Turning from your back to your side while in flat bed without using bedrails? 4  -CL     Moving from lying on back to sitting on the side of a flat bed without bedrails? 3  -CL     Moving to and from a bed to a chair (including a  wheelchair)? 3  -CL     Standing up from a chair using your arms (e.g., wheelchair, bedside chair)? 3  -CL     Climbing 3-5 steps with a railing? 2  -CL     To walk in hospital room? 3  -CL     AM-PAC 6 Clicks Score (PT) 18  -CL     Highest level of mobility 6 --> Walked 10 steps or more  -CL     Row Name 12/23/22 1438 12/23/22 1408       Functional Assessment    Outcome Measure Options AM-PAC 6 Clicks Daily Activity (OT)  -SR AM-PAC 6 Clicks Basic Mobility (PT)  -CL          User Key  (r) = Recorded By, (t) = Taken By, (c) = Cosigned By    Initials Name Provider Type    SR Susie Goodson, OT Occupational Therapist    CL Vero Quijano, PT Physical Therapist                Occupational Therapy Education     Title: PT OT SLP Therapies (In Progress)     Topic: Occupational Therapy (In Progress)     Point: ADL training (In Progress)     Description:   Instruct learner(s) on proper safety adaptation and remediation techniques during self care or transfers.   Instruct in proper use of assistive devices.              Learning Progress Summary           Patient Acceptance, E,TB, NR by SR at 12/23/2022 1440                   Point: Home exercise program (Not Started)     Description:   Instruct learner(s) on appropriate technique for monitoring, assisting and/or progressing therapeutic exercises/activities.              Learner Progress:  Not documented in this visit.          Point: Precautions (Not Started)     Description:   Instruct learner(s) on prescribed precautions during self-care and functional transfers.              Learner Progress:  Not documented in this visit.          Point: Body mechanics (In Progress)     Description:   Instruct learner(s) on proper positioning and spine alignment during self-care, functional mobility activities and/or exercises.              Learning Progress Summary           Patient Acceptance, E,TB, NR by SR at 12/23/2022 1440                               User Key      Initials Effective Dates Name Provider Type Discipline    SR 06/16/21 -  Susie Goodson OT Occupational Therapist OT              OT Recommendation and Plan  Planned Therapy Interventions (OT): activity tolerance training, BADL retraining, functional balance retraining, occupation/activity based interventions, passive ROM/stretching, ROM/therapeutic exercise, strengthening exercise, transfer/mobility retraining  Therapy Frequency (OT): 5 times/wk  Plan of Care Review  Outcome Evaluation: Pt is a 67 y.o. male recently hospitalized with Afib who was sent to ED from  PT with BP of 70/46. PMH includes: RA, COPD, PNA, WOLF w/ obstructive uropathy, SSS w/ permanent pacer, CAD s/p CABG,and intersitial lung disease. Pt and wife recently moved to area after [place] home in [place]. They are living with their daughter while new home is being built. Wife reports that for the last few days, she has been pushing him on his rollator into the bathroom as he has been so weak.  He requires multiple rest breaks due to SOA and O2 sats drop to the low 80s at times on 6L.  He recovers with PLB.  He was able to stand and take steps toward HOB with standing rest break before taking steps.  Anticipate significant difficulty with all ADLs due to low activity tolerance.  He is not safe to return home at discharge.  Recommend SNF at discharge.     Time Calculation:    Time Calculation- OT     Row Name 12/23/22 1440             Time Calculation- OT    OT Start Time 1125  -SR      OT Stop Time 1145  -SR      OT Time Calculation (min) 20 min  -SR      Total Timed Code Minutes- OT 0 minute(s)  -SR      OT Received On 12/23/22  -      OT - Next Appointment 12/26/22  -      OT Goal Re-Cert Due Date 01/06/23  -SR            User Key  (r) = Recorded By, (t) = Taken By, (c) = Cosigned By    Initials Name Provider Type    SR Susie Goodson OT Occupational Therapist              Therapy Charges for Today     Code Description  Service Date Service Provider Modifiers Qty    [id number] HC OT EVAL MOD COMPLEXITY 4 12/23/2022 Susie Goodson, OT GO 1               Susie Goodson, OT  12/23/2022

## 2022-12-23 NOTE — PLAN OF CARE
Goal Outcome Evaluation:  Plan of Care Reviewed With: patient        Progress: no change  Outcome Evaluation: Patient shows no s/s of distress. NP notified of low BP. Bed alarm on and call light within reach. Will continue to observe.

## 2022-12-23 NOTE — CONSULTS
Group: [facility]         CONSULT NOTE    Patient Identification:  Fabian Manjarrez  67 y.o.  male  1955  [id number]            Requesting physician: Attending physician    Reason for Consultation: Worsening shortness of breath      History of Present Illness:  67-year-old male with history rheumatoid arthritis for several years but has progressive shortness of breath for the last 6-month.  He has tried methotrexate, and other immunosuppressants but had side effects from that and is currently only on prednisone 5 mg daily.      Assessment:  Generalized weakness  Acute respiratory failure with hypoxia: Pneumonia versus acute exacerbation of ILD  Chronic Obstructive Pulmonary Disease  -recently discharged from [facility] on 2L oxygen      Interstitial Lung Disease: related to RA:  on 12/16/2022  CT of the chest 12/12/2022 shows UIP with honeycombing and bronchiectasis   -recently treated with tapering dose of prednisone      Rheumatoid arthritis, treated with Leflunomide (Arava), methotrexate and hydroxychloroquine but did not tolerated and chronic prednsione  -followed by Dr Lauren in [place]     Atrial Fibrillation with RVR  SSS s/p pacer     12/11/2022 ECHO  EF 40%  RVSP 22.6 mmHg  -mild AV stenosis  Right ventricle severely dilated      Hypertension  GERD    Recommendations:  Prednisone 40 mg daily and once patient improves will consider starting mycophenolate/CellCept  Oxygen supplement and titration to maintain saturation 90 to 95%  Bronchodilatores  Antibiotics: Cefepime and vancomycin, check nasal MRSA swab  Heart rate control  Anticoagulation: Xarelto  DVT/GI prophylaxis  Check daily labs and correct electrolytes as needed        Review of Sytems:  Constitutional: Negative for chills, fever and positive for malaise/fatigue.   HENT: Negative.    Eyes: Negative.    Cardiovascular: Negative.    Respiratory: Positive for cough and shortness of breath.    Skin: Negative.     Musculoskeletal: Negative.    Gastrointestinal: Negative.    Genitourinary: Negative.    Neurological: Negative.    Psychiatric/Behavioral: Negative.    Past Medical History:  Past Medical History:   Diagnosis Date   • Arthritis    • Asthma    • CHF (congestive heart failure) ([facility])    • COPD (chronic obstructive pulmonary disease) ([facility])    • Coronary artery disease    • Elevated cholesterol    • GERD (gastroesophageal reflux disease)    • History of transfusion    • Hypertension        Past Surgical History:  Past Surgical History:   Procedure Laterality Date   • ABDOMINAL SURGERY     • CARDIAC CATHETERIZATION     • CARDIAC SURGERY     • COLON SURGERY     • COLONOSCOPY     • ENDOSCOPY     • HERNIA REPAIR          Home Meds:  Medications Prior to Admission   Medication Sig Dispense Refill Last Dose   • albuterol sulfate  (90 Base) MCG/ACT inhaler Inhale 1 puff Every 4 (Four) Hours As Needed for Wheezing.      • amiodarone (PACERONE) 200 MG tablet Take 1 tablet by mouth Every 12 (Twelve) Hours for 90 days. 60 tablet 2    • aspirin 81 MG EC tablet Take 81 mg by mouth Daily.      • buPROPion SR (WELLBUTRIN SR) 100 MG 12 hr tablet Take 100 mg by mouth Daily.      • docusate sodium (COLACE) 100 MG capsule Take 100 mg by mouth Daily As Needed for Constipation.      • ezetimibe (ZETIA) 10 MG tablet Take 10 mg by mouth Every Evening.      • ferrous sulfate 325 (65 FE) MG tablet Take 325 mg by mouth Daily With Breakfast.      • FLUoxetine (PROzac) 40 MG capsule Take 40 mg by mouth Daily.      • guaiFENesin (MUCINEX) 600 MG 12 hr tablet Take 600 mg by mouth Daily.      • HYDROcodone-acetaminophen (NORCO) 7.5-325 MG per tablet Take 1 tablet by mouth Every 6 (Six) Hours As Needed for Moderate Pain.      • lisinopril (PRINIVIL,ZESTRIL) 5 MG tablet Take 1 tablet by mouth Daily for 90 days. 30 tablet 2    • metoprolol tartrate (LOPRESSOR) 25 MG tablet Take 25 mg by mouth 2 (Two) Times a Day.      • pantoprazole (PROTONIX)  40 MG EC tablet Take 40 mg by mouth 2 (Two) Times a Day.      • predniSONE (DELTASONE) 5 MG tablet Take 5 mg by mouth Daily.      • pregabalin (LYRICA) 75 MG capsule Take 75 mg by mouth 2 (Two) Times a Day.      • ranolazine (RANEXA) 1000 MG 12 hr tablet Take 1,000 mg by mouth 2 (Two) Times a Day.      • rivaroxaban (XARELTO) 20 MG tablet Take 1 tablet by mouth Daily With Dinner for 90 days. Indications: Atrial Fibrillation 30 tablet 2    • rosuvastatin (CRESTOR) 20 MG tablet Take 20 mg by mouth Every Night.      • tamsulosin (FLOMAX) 0.4 MG capsule 24 hr capsule Take 1 capsule by mouth Daily.      • vitamin B-12 (CYANOCOBALAMIN) 1000 MCG tablet Take 1,000 mcg by mouth Daily.          Allergies:  Allergies   Allergen Reactions   • Leflunomide Diarrhea   • Methotrexate Diarrhea   • Nitroglycerin Headache   • Nsaids GI Bleeding   • Plaquenil [Hydroxychloroquine] Diarrhea     Weight loss       Social History:   Social History     Socioeconomic History   • Marital status:    Tobacco Use   • Smoking status: Never     Passive exposure: Never   • Smokeless tobacco: Never   Vaping Use   • Vaping Use: Never used   Substance and Sexual Activity   • Alcohol use: Not Currently   • Drug use: Never   • Sexual activity: Defer       Family History:  History reviewed. No pertinent family history.    Physical Exam:  BP (!) 86/40   Pulse 70   Temp 98.5 °F (36.9 °C)   Resp 20   Ht 172.7 cm (68\")   Wt 70 kg (154 lb 5.2 oz)   SpO2 94%   BMI 23.46 kg/m²  Body mass index is 23.46 kg/m². 94% 70 kg (154 lb 5.2 oz)  General Appearance:  Alert   HEENT:  Normocephalic, without obvious abnormality, Conjunctiva/corneas clear,.   Nares normal, no drainage     Neck:  Supple, symmetrical, trachea midline. No JVD.  Lungs /Chest wall:   Bilateral scattered dry rhonchi, respirations unlabored, symmetrical wall movement.     Heart:  Regular rate and rhythm, S1 S2 normal  Abdomen: Soft, non-tender, no masses, no organomegaly.     Extremities: No edema, no clubbing or cyanosis    LABS:  Lab Results   Component Value Date    CALCIUM 8.3 (L) 12/23/2022     Results from last 7 days   Lab Units 12/23/22  0944 12/17/22  0445 12/17/22  0445 12/16/22  2355   MAGNESIUM mg/dL 1.6  --   --   --    SODIUM mmol/L 129*  --  132*  --    POTASSIUM mmol/L 4.3  --  4.4  --    CHLORIDE mmol/L 101  --  99  --    CO2 mmol/L 19.0*  --  27.0  --    BUN mg/dL 13 -- 22  --    CREATININE mg/dL 1.00  --  1.04  --    GLUCOSE mg/dL 89   < > 98  --    CALCIUM mg/dL 8.3*  --  8.5*  --    WBC 10*3/mm3 13.30*  --   --  19.00*   HEMOGLOBIN g/dL 8.1*  --   --  8.9*   PLATELETS 10*3/mm3 285  --   --  305   ALT (SGPT) U/L  --   --  17  --    AST (SGOT) U/L  --   --  21  --    PROBNP pg/mL 1,110.0*  --   --   --     < > = values in this interval not displayed.     Lab Results   Component Value Date    TROPONINT 0.027 12/12/2022                 Results from last 7 days   Lab Units 12/23/22  1206   PH, ARTERIAL pH units 7.413   PCO2, ARTERIAL mm Hg 30.4*   PO2 ART mm Hg 63.4*   O2 SATURATION ART % 92.5*   MODALITY  HFNC                 Lab Results   Component Value Date    TSH 2.550 12/23/2022     Estimated Creatinine Clearance: 70 mL/min (by C-G formula based on SCr of 1 mg/dL).         Imaging:  Imaging Results (Last 24 Hours)     Procedure Component Value Units Date/Time    XR Chest 1 View [[id number]] Collected: 12/23/22 1023     Updated: 12/23/22 1027    Narrative:      XR CHEST 1 VW    Date of Exam: 12/23/2022 9:55 AM EST    Indication: Recent PNA.    Comparison: 12/15/2022 at 12:12 PM    Findings:  Mild worsening ill-defined infiltrates with interstitial changes are seen bilaterally. Scattered granulomas are again noted. The cardiac silhouette and mediastinum are stable. Postoperative changes are noted. A left cardiac pacemaker/ICD is noted with   leads intact. No acute osseous abnormalities are observed.      Impression:      Impression:  1.Mild worsening of ill-defined  infiltrates bilaterally with interstitial changes.    Electronically Signed: Yuniel Timmons MD    12/23/2022 10:24 AM EST    Workstation ID: [id number]            Current Meds:   SCHEDULE  amiodarone, 200 mg, Oral, Q12H  aspirin, 81 mg, Oral, Daily  buPROPion SR, 100 mg, Oral, Daily  cefepime, 2 g, Intravenous, Once  cefepime, 2 g, Intravenous, Q8H  ferrous sulfate, 324 mg, Oral, Daily With Breakfast  FLUoxetine, 40 mg, Oral, Daily  guaiFENesin, 600 mg, Oral, Daily  pantoprazole, 40 mg, Oral, BID  predniSONE, 5 mg, Oral, Daily  pregabalin, 75 mg, Oral, BID  ranolazine, 1,000 mg, Oral, BID  rivaroxaban, 20 mg, Oral, Daily With Dinner  rosuvastatin, 20 mg, Oral, Nightly  sodium chloride, 10 mL, Intravenous, Q12H  tamsulosin, 0.4 mg, Oral, Daily  [START ON 12/24/2022] vancomycin, 1,000 mg, Intravenous, Q12H  vancomycin, 20 mg/kg, Intravenous, Once  vitamin B-12, 1,000 mcg, Oral, Daily      Infusions  Pharmacy to Dose Cefepime,   Pharmacy to dose vancomycin,       PRNs  •  acetaminophen **OR** acetaminophen **OR** acetaminophen  •  HYDROcodone-acetaminophen  •  nitroglycerin  •  ondansetron  •  Pharmacy to Dose Cefepime  •  Pharmacy to dose vancomycin  •  sodium chloride  •  sodium chloride        Maryse Jones MD  12/23/2022  12:36 EST      Much of this encounter note is an electronic transcription/translation of spoken language to printed text using Dragon Software.

## 2022-12-23 NOTE — H&P
[facility] Medicine Services      Patient Name: Fabian Manjarrez  : 1955  MRN: [id number]  Primary Care Physician:  Addison Burdick MD  Date of admission: 2022      Subjective      Chief Complaint: Generalized weakness    History of Present Illness: Fabian Manjarrez is a 67 y.o. male who presented to [facility] on 2022 complaining of persistent generalized weakness.  Patient was hospitalized here at this facility from 12/10/2022 to 2022.  At that time he was admitted for A. fib with RVR and acute on chronic respiratory failure with hypercapnia secondary to pneumonia.  He was discharged on oxygen which she had never had to use before.  Patient is a former smoker who quit in 2016.  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  Patient and wife both report they had COVID and then recovered but this patient a week or 2 after that had the A. fib and was admitted and has not been the same since.  Patient does have a history of rheumatoid arthritis and interstitial lung disease.   Due to his increased weakness at home and inability to stand and walk without becoming very fatigued and dizzy, his wife took him to [facility] ER yesterday.  Today temp 98.0, BP 94/57, on 6 L high flow nasal cannula, glucose 89, sodium 129, creatinine 1, BUN 13, iron 20, iron saturation 6, total iron binding capacity 349, magnesium 1.6, WBC 13.30, hemoglobin 8.1, and CXR shows mild worsening of ill-defined infiltrates bilaterally with interstitial changes.      Review of Systems   Constitutional: Positive for decreased appetite and malaise/fatigue.   Musculoskeletal: Positive for arthritis, joint pain, muscle weakness and myalgias.   Genitourinary: Positive for hesitancy and incomplete emptying.        Sanford catheter placed last night at [facility].  Wife reports  patient was to have a bladder [facility] with [facility] but that has not been scheduled yet.  Patient reports he has to sit in the bathroom alone with warm water running and it will still take a very long time for him to urinate.   Neurological: Positive for dizziness.   All other systems reviewed and are negative.      Personal History     Past Medical History:   Diagnosis Date   • Arthritis    • Asthma    • CHF (congestive heart failure) ([facility])    • COPD (chronic obstructive pulmonary disease) ([facility])    • Coronary artery disease    • Elevated cholesterol    • GERD (gastroesophageal reflux disease)    • History of transfusion    • Hypertension        Past Surgical History:   Procedure Laterality Date   • ABDOMINAL SURGERY     • CARDIAC CATHETERIZATION     • CARDIAC SURGERY     • COLON SURGERY     • COLONOSCOPY     • ENDOSCOPY     • HERNIA REPAIR         Family History: family history is not on file. Otherwise pertinent FHx was reviewed and not pertinent to current issue.    Social History:  reports that he has never smoked. He has never been exposed to tobacco smoke. He has never used smokeless tobacco. He reports that he does not currently use alcohol. He reports that he does not use drugs.    Home Medications:  Prior to Admission Medications     Prescriptions Last Dose Informant Patient Reported? Taking?    albuterol sulfate  (90 Base) MCG/ACT inhaler   Yes No    Inhale 1 puff Every 4 (Four) Hours As Needed for Wheezing.    amiodarone (PACERONE) 200 MG tablet   No No    Take 1 tablet by mouth Every 12 (Twelve) Hours for 90 days.    aspirin 81 MG EC tablet   Yes No    Take 81 mg by mouth Daily.    buPROPion SR (WELLBUTRIN SR) 100 MG 12 hr tablet   Yes No    Take 100 mg by mouth Daily.    docusate sodium (COLACE) 100 MG capsule   Yes No    Take 100 mg by mouth Daily As Needed for Constipation.    ezetimibe (ZETIA) 10 MG tablet   Yes No    Take 10 mg by mouth Daily.    ferrous sulfate 325 (65 FE) MG tablet    Yes No    Take 40 mg by mouth Daily With Breakfast.    FLUoxetine (PROzac) 40 MG capsule   Yes No    Take 40 mg by mouth Daily.    guaiFENesin (MUCINEX) 600 MG 12 hr tablet   Yes No    Take 600 mg by mouth Daily.    HYDROcodone-acetaminophen (NORCO)  MG per tablet   Yes No    Take 1 tablet by mouth Every 4 (Four) Hours As Needed for Moderate Pain.    lisinopril (PRINIVIL,ZESTRIL) 5 MG tablet   No No    Take 1 tablet by mouth Daily for 90 days.    metoprolol tartrate (LOPRESSOR) 25 MG tablet   Yes No    Take 25 mg by mouth 2 (Two) Times a Day.    pantoprazole (PROTONIX) 40 MG EC tablet   Yes No    Take 40 mg by mouth 2 (Two) Times a Day.    predniSONE (DELTASONE) 5 MG tablet   Yes No    Take 5 mg by mouth Daily.    pregabalin (LYRICA) 75 MG capsule   Yes No    Take 75 mg by mouth 2 (Two) Times a Day.    ranolazine (RANEXA) 1000 MG 12 hr tablet   Yes No    Take 1,000 mg by mouth 2 (Two) Times a Day.    rivaroxaban (XARELTO) 20 MG tablet   No No    Take 1 tablet by mouth Daily With Dinner for 90 days. Indications: Atrial Fibrillation    rosuvastatin (CRESTOR) 20 MG tablet   Yes No    Take 20 mg by mouth Every Night.    tamsulosin (FLOMAX) 0.4 MG capsule 24 hr capsule   Yes No    Take 1 capsule by mouth Daily.    vitamin B-12 (CYANOCOBALAMIN) 1000 MCG tablet   Yes No    Take 1,000 mcg by mouth Daily.            Allergies:  Allergies   Allergen Reactions   • Leflunomide Diarrhea   • Methotrexate Diarrhea   • Nitroglycerin Headache   • Nsaids GI Bleeding   • Plaquenil [Hydroxychloroquine] Diarrhea     Weight loss       Objective      Vitals:   Temp:  [98 °F (36.7 °C)] 98 °F (36.7 °C)  Heart Rate:  [75] 75  Resp:  [19] 19  BP: (94)/(57) 94/57  Flow (L/min):  [6] 6    Physical Exam  Vitals reviewed.   Constitutional:       Appearance: He is ill-appearing.      Interventions: Nasal cannula in place.   HENT:      Head: Normocephalic.      Mouth/Throat:      Mouth: Mucous membranes are moist.   Eyes:      Pupils: Pupils  are equal, round, and reactive to light.   Cardiovascular:      Rate and Rhythm: Normal rate and regular rhythm.      Pulses: Normal pulses.      Heart sounds: Normal heart sounds.   Pulmonary:      Effort: Pulmonary effort is normal.      Breath sounds: Examination of the right-lower field reveals decreased breath sounds. Examination of the left-lower field reveals decreased breath sounds. Decreased breath sounds present.   Abdominal:      General: Bowel sounds are normal.      Palpations: Abdomen is soft.   Genitourinary:     Comments: Sanford catheter to bedside drainage  Musculoskeletal:      Right lower leg: No edema.      Left lower leg: No edema.   Skin:     General: Skin is warm and dry.   Neurological:      Mental Status: He is alert and oriented to person, place, and time.   Psychiatric:         Mood and Affect: Mood normal.         Behavior: Behavior normal. Behavior is cooperative.          Result Review    Result Review:  I have personally reviewed the results from the time of this admission to 12/23/2022 10:32 EST and agree with these findings:  [x]  Laboratory  [x]  Microbiology  [x]  Radiology  [x]  EKG/Telemetry   []  Cardiology/Vascular   []  Pathology  []  Old records  []  Other:      Assessment & Plan        Active Hospital Problems:  Active Hospital Problems    Diagnosis    • **Generalized weakness      Plan:     Respiratory distress  COPD  Interstitial lung disease  - CXR reviewed: Worsening  - Currently requiring 6L HFNC, continue to wean in order to maintain O2 sat> 90%  - DuoNebs and Pulmicort  - Incentive spirometer  - Prednisone and Mucinex  - Pulmonary consulted  - Transferred to PCU  - Zosyn and vancomycin  - ABG ordered    Hypotension  H/O HTN  CHF   - According to nursing staff, patient received 3.5L NS at [facility]  - proBNP ordered  - Currently hypotensive but appears to be chronic after chart review  - Hold home BP meds    Generalized weakness  History of anemia  - PT/OT consulted for  possible placement for inpatient rehab  - Labs reviewed  - Continue ferrous sulfate  - Monitor daily labs    Urinary hesitancy  - Sanford catheter at bedside  - Flomax  - Patient to follow-up with urology after discharge as previously planned    Primary osteoarthritis  Rheumatoid arthritis  - Prednisone 40 mg daily for RA, according to wife  - Lyrica  - Norco 10 mg as needed for pain    A. fib  - Amiodarone and Xarelto  - EKG ordered    HLD  - Lipid profile ordered  - Zetia    DVT prophylaxis  -On Xarelto    CODE STATUS: Full code  Level Of Support Discussed With: Patient  Code Status (Patient has no pulse and is not breathing): CPR (Attempt to Resuscitate)  Medical Interventions (Patient has pulse or is breathing): Full Support    Admission Status:  I believe this patient meets inpatient status.    I discussed the patient's findings and my recommendations with patient and family.      Signature: Electronically signed by LE Higgins, 12/23/22, 10:32 AM EST.

## 2022-12-23 NOTE — THERAPY EVALUATION
Patient Name: Fabian Manjarrez  : 1955    MRN: [id number]                              Today's Date: 2022       Admit Date: 2022    Visit Dx: No diagnosis found.  Patient Active Problem List   Diagnosis   • Atrial fibrillation with rapid ventricular response ([facility])   • COPD (chronic obstructive pulmonary disease) ([facility])   • Primary hypertension   • Coronary artery disease involving autologous vein coronary bypass graft without angina pectoris   • GERD without esophagitis   • Primary osteoarthritis involving multiple joints   • Mixed hyperlipidemia   • Acute on chronic respiratory failure with hypercapnia ([facility])   • Physical debility   • Chronic HFrEF (heart failure with reduced ejection fraction) ([facility])   • Generalized weakness     Past Medical History:   Diagnosis Date   • Arthritis    • Asthma    • CHF (congestive heart failure) ([facility])    • COPD (chronic obstructive pulmonary disease) ([facility])    • Coronary artery disease    • Elevated cholesterol    • GERD (gastroesophageal reflux disease)    • History of transfusion    • Hypertension      Past Surgical History:   Procedure Laterality Date   • ABDOMINAL SURGERY     • CARDIAC CATHETERIZATION     • CARDIAC SURGERY     • COLON SURGERY     • COLONOSCOPY     • ENDOSCOPY     • HERNIA REPAIR        General Information     Row Name 22 [id number]          Physical Therapy Time and Intention    Document Type evaluation  -CL     Mode of Treatment co-treatment;physical therapy;occupational therapy  -CL     Row Name 22 2605          General Information    Patient Profile Reviewed yes  -CL     Prior Level of Function mod assist:;all household mobility;gait;transfer;ADL's  -CL     Existing Precautions/Restrictions oxygen therapy device and L/min;fall  -CL     Barriers to Rehab medically complex  -CL     Row Name 22 2923          Living Environment    People in Home spouse  -CL     Row Name 22 [facility]          Home Main Entrance    Number of Stairs, Main  Entrance two  -CL     Row Name 12/23/22 1357          Stairs Within Home, Primary    Number of Stairs, Within Home, Primary none  -CL     Row Name 12/23/22 1352          Cognition    Orientation Status (Cognition) oriented x 4  -CL           User Key  (r) = Recorded By, (t) = Taken By, (c) = Cosigned By    Initials Name Provider Type    Vero Chandler, PT Physical Therapist               Mobility    No documentation.                Obj/Interventions     Row Name 12/23/22 1359          Range of Motion Comprehensive    General Range of Motion bilateral lower extremity ROM WFL  -CL     Row Name 12/23/22 1357          Strength Comprehensive (MMT)    Comment, General Manual Muscle Testing (MMT) Assessment Hip flex 4/5 bilaterally, knee ext 4/5 R, 4+/5 L  -CL     Row Name 12/23/22 1359          Balance    Balance Assessment sitting dynamic balance;standing dynamic balance  -CL     Dynamic Sitting Balance independent  -CL     Position, Sitting Balance sitting edge of bed  -CL     Position/Device Used, Standing Balance supported;walker, front-wheeled  -CL           User Key  (r) = Recorded By, (t) = Taken By, (c) = Cosigned By    Initials Name Provider Type    Vero Chandler, PT Physical Therapist               Goals/Plan     Row Name 12/23/22 1407          Bed Mobility Goal 1 (PT)    Activity/Assistive Device (Bed Mobility Goal 1, PT) bed mobility activities, all  -CL     Angelina Level/Cues Needed (Bed Mobility Goal 1, PT) modified independence  -CL     Time Frame (Bed Mobility Goal 1, PT) long term goal (LTG);2 weeks  -CL     Row Name 12/23/22 1407          Transfer Goal 1 (PT)    Activity/Assistive Device (Transfer Goal 1, PT) sit-to-stand/stand-to-sit;bed-to-chair/chair-to-bed  -CL     Angelina Level/Cues Needed (Transfer Goal 1, PT) modified independence  -CL     Time Frame (Transfer Goal 1, PT) long term goal (LTG);2 weeks  -CL     Row Name 12/23/22 1407          Gait Training Goal 1 (PT)     Activity/Assistive Device (Gait Training Goal 1, PT) gait (walking locomotion);assistive device use  -CL     Thornton Level (Gait Training Goal 1, PT) modified independence  -CL     Distance (Gait Training Goal 1, PT) 150'  -CL     Time Frame (Gait Training Goal 1, PT) long term goal (LTG);2 weeks  -CL     Row Name 12/23/22 1407          Stairs Goal 1 (PT)    Activity/Assistive Device (Stairs Goal 1, PT) ascending stairs;descending stairs  -CL     Thornton Level/Cues Needed (Stairs Goal 1, PT) modified independence  -CL     Number of Stairs (Stairs Goal 1, PT) 2  -CL     Time Frame (Stairs Goal 1, PT) long term goal (LTG);2 weeks  -CL     Row Name 12/23/22 1407          Therapy Assessment/Plan (PT)    Planned Therapy Interventions (PT) balance training;bed mobility training;gait training;patient/family education;stair training;strengthening;transfer training  -CL           User Key  (r) = Recorded By, (t) = Taken By, (c) = Cosigned By    Initials Name Provider Type    CL Vero Quijano, PT Physical Therapist               Clinical Impression     Row Name 12/23/22 1400          Pain    Pretreatment Pain Rating 4/10  -CL     Posttreatment Pain Rating 4/10  -CL     Pain Location - Side/Orientation Right  -CL     Pain Location generalized  -CL     Pain Location - shoulder  -CL     Pain Intervention(s) Repositioned  -CL     Row Name 12/23/22 1400          Plan of Care Review    Plan of Care Reviewed With patient;spouse  -CL     Outcome Evaluation Pt is a 67 y.o. male recently hospitalized with Afib who was sent to ED from  PT with BP of 70/46. PMH includes: RA, COPD, PNA, WOLF w/ obstructive uropathy, SSS w/ permanent pacer, CAD s/p CABG,and intersitial lung disease.  Pt and wife recently moved to [facility] after losing home in [place].  They are living with their daughter while new home is being built.  Wife reports that for the last few days, she has been pushing him on his rollator into the bathroom as  he has been so weak.  At time of evaluation, he was able to perform bed mobility and supine - sit transfers with min A. He c/o weakness, SOA and dizziness with transitions but does not appear orthostatic.  He was able to stand with min A and take 4 lateral steps with RW; limited by fatigue and desaturation to 82% on 6L.  Pt is below his baseline and may benefit from SNF for PT to improve strength prior to return home.  -CL     Row Name 12/23/22 1400          Therapy Assessment/Plan (PT)    Rehab Potential (PT) good, to achieve stated therapy goals  -CL     Criteria for Skilled Interventions Met (PT) yes;skilled treatment is necessary  -CL     Therapy Frequency (PT) 5 times/wk  -CL     Predicted Duration of Therapy Intervention (PT) Until discharge  -CL     Row Name 12/23/22 1400          Vital Signs    Pre Systolic BP Rehab 99  -CL     Pre Treatment Diastolic BP 51  -CL     Intra Systolic BP Rehab 101  -CL     Intra Treatment Diastolic BP 58  -CL     Posttreatment Heart Rate (beats/min) 94  -CL     Pre SpO2 (%) 92  -CL     O2 Delivery Pre Treatment supplemental O2  -CL     Intra SpO2 (%) 82  -CL     O2 Delivery Intra Treatment supplemental O2  -CL     Post SpO2 (%) 94  -CL     O2 Delivery Post Treatment supplemental O2  -CL     Pre Patient Position Supine  -CL     Intra Patient Position Sitting  -CL     Post Patient Position Supine  -CL     Row Name 12/23/22 1400          Positioning and Restraints    Pre-Treatment Position in bed  -CL     Post Treatment Position bed  -CL     In Bed supine;call light within reach;encouraged to call for assist;with family/caregiver;exit alarm on  -CL           User Key  (r) = Recorded By, (t) = Taken By, (c) = Cosigned By    Initials Name Provider Type    CL Vero Quijano, PT Physical Therapist               Outcome Measures     Row Name 12/23/22 1403          How much help from another person do you currently need...    Turning from your back to your side while in flat bed  without using bedrails? 4  -CL     Moving from lying on back to sitting on the side of a flat bed without bedrails? 3  -CL     Moving to and from a bed to a chair (including a wheelchair)? 3  -CL     Standing up from a chair using your arms (e.g., wheelchair, bedside chair)? 3  -CL     Climbing 3-5 steps with a railing? 2  -CL     To walk in hospital room? 3  -CL     AM-PAC 6 Clicks Score (PT) 18  -CL     Highest level of mobility 6 --> Walked 10 steps or more  -CL     Row Name 12/23/22 1408          Functional Assessment    Outcome Measure Options AM-PAC 6 Clicks Basic Mobility (PT)  -CL           User Key  (r) = Recorded By, (t) = Taken By, (c) = Cosigned By    Initials Name Provider Type    CL Vero Quijano, PT Physical Therapist                             Physical Therapy Education     Title: PT OT SLP Therapies (Done)     Topic: Physical Therapy (Done)     Point: Mobility training (Done)     Learning Progress Summary           Patient Acceptance, E,TB, VU by CL at 12/23/2022 1408                   Point: Body mechanics (Done)     Learning Progress Summary           Patient Acceptance, E,TB, VU by CL at 12/23/2022 1408                   Point: Precautions (Done)     Learning Progress Summary           Patient Acceptance, E,TB, VU by CL at 12/23/2022 1408                               User Key     Initials Effective Dates Name Provider Type Discipline     03/02/22 -  Vero Quijano PT Physical Therapist PT              PT Recommendation and Plan  Planned Therapy Interventions (PT): balance training, bed mobility training, gait training, patient/family education, stair training, strengthening, transfer training  Plan of Care Reviewed With: patient, spouse  Outcome Evaluation: Pt is a 67 y.o. male recently hospitalized with Afib who was sent to ED from  PT with BP of 70/46. PMH includes: RA, COPD, PNA, WOLF w/ obstructive uropathy, SSS w/ permanent pacer, CAD s/p CABG,and intersitial lung disease.  Pt  and wife recently moved to area after losing home in [place].  They are living with their daughter while new home is being built.  Wife reports that for the last few days, she has been pushing him on his rollator into the bathroom as he has been so weak.  At time of evaluation, he was able to perform bed mobility and supine - sit transfers with min A. He c/o weakness, SOA and dizziness with transitions but does not appear orthostatic.  He was able to stand with min A and take 4 lateral steps with RW; limited by fatigue and desaturation to 82% on 6L.  Pt is below his baseline and may benefit from SNF for PT to improve strength prior to return home.     Time Calculation:    PT Charges     Row Name 12/23/22 1408             Time Calculation    Start Time 1125  -CL      Stop Time 1145  -CL      Time Calculation (min) 20 min  -CL      PT Received On 12/23/22  -CL      PT - Next Appointment 12/26/22  -CL      PT Goal Re-Cert Due Date 01/06/23  -CL         Time Calculation- PT    Total Timed Code Minutes- PT 0 minute(s)  -CL            User Key  (r) = Recorded By, (t) = Taken By, (c) = Cosigned By    Initials Name Provider Type    CL Vero Quijano, PT Physical Therapist              Therapy Charges for Today     Code Description Service Date Service Provider Modifiers Qty    96041696447 HC PT EVAL MOD COMPLEXITY 4 12/23/2022 Vero Quijano, PT GP 1          PT G-Codes  Outcome Measure Options: AM-PAC 6 Clicks Basic Mobility (PT)  AM-PAC 6 Clicks Score (PT): 18  PT Discharge Summary  Anticipated Discharge Disposition (PT): skilled nursing facility    Vero Quijano PT  12/23/2022

## 2022-12-23 NOTE — NURSING NOTE
[facility] act was place at [facility]. They stated in report that patient was straining to pee and had to pour hot water over him to help urinate.

## 2022-12-23 NOTE — PLAN OF CARE
Goal Outcome Evaluation:              Outcome Evaluation: Pt is a 67 y.o. male recently hospitalized with Afib who was sent to ED from  PT with BP of 70/46. PMH includes: RA, COPD, PNA, WOLF w/ obstructive uropathy, SSS w/ permanent pacer, CAD s/p CABG,and intersitial lung disease. Pt and wife recently moved to area after losing home in [place]. They are living with their daughter while new home is being built. Wife reports that for the last few days, she has been pushing him on his rollator into the bathroom as he has been so weak.  He requires multiple rest breaks due to SOA and O2 sats drop to the low 80s at times on 6L.  He recovers with PLB.  He was able to stand and take steps toward HOB with standing rest break before taking steps.  Anticipate significant difficulty with all ADLs due to low activity tolerance.  He is not safe to return home at discharge.  Recommend SNF at discharge.

## 2022-12-23 NOTE — DISCHARGE PLACEMENT REQUEST
Fabian Manjarrez (67 y.o. Male)     Date of Birth   1955    Social Security Number       Address   [address] [facility]    Home Phone   [phone]    MRN   [id number]       Gnosticism   None    Marital Status                               Admission Date   12/23/22    Admission Type   Urgent    Admitting Provider   Kye Avelar MD    Attending Provider   Kye Avelar MD    Department, Room/Bed   [facility] 3C MEDICAL INPATIENT, 365/1       Discharge Date       Discharge Disposition       Discharge Destination                               Attending Provider: Kye Avelar MD    Allergies: Leflunomide, Methotrexate, Nitroglycerin, Nsaids, Plaquenil [Hydroxychloroquine]    Isolation: None   Infection: None   Code Status: CPR    Ht: 172.7 cm (68\")   Wt: 70 kg (154 lb 5.2 oz)    Admission Cmt: None   Principal Problem: Generalized weakness [R53.1]                 Active Insurance as of 12/23/2022     Primary Coverage     Payor Plan Insurance Group Employer/Plan Group    MEDICARE MEDICARE A & B      Payor Plan Address Payor Plan Phone Number Payor Plan Fax Number Effective Dates    [address] [phone]  6/1/2019 - None Entered    [facility] [id number]       Subscriber Name Subscriber Birth Date Member ID       FABIAN MANJARREZ 1955 [id number]                 Emergency Contacts      (Rel.) Home Phone Work Phone Mobile Phone    Wilton Manjarrez (Spouse) -- -- [phone]

## 2022-12-23 NOTE — PLAN OF CARE
Goal Outcome Evaluation:  Plan of Care Reviewed With: patient, spouse           Outcome Evaluation: Pt is a 67 y.o. male recently hospitalized with Afib who was sent to ED from  PT with BP of 70/46. PMH includes: RA, COPD, PNA, WOLF w/ obstructive uropathy, SSS w/ permanent pacer, CAD s/p CABG,and intersitial lung disease.  Pt and wife recently moved to area after losing home in [place].  They are living with their daughter while new home is being built.  Wife reports that for the last few days, she has been pushing him on his rollator into the bathroom as he has been so weak.  At time of evaluation, he was able to perform bed mobility and supine - sit transfers with min A. He c/o weakness, SOA and dizziness with transitions but does not appear orthostatic.  He was able to stand with min A and take 4 lateral steps with RW; limited by fatigue and desaturation to 82% on 6L.  Pt is below his baseline and may benefit from SNF for PT to improve strength prior to return home.

## 2022-12-23 NOTE — PROGRESS NOTES
Pharmacy Antimicrobial Dosing Service    Subjective:  Fabian Manjarrez is a 67 y.o.male admitted with generalized weakness. Pharmacy has been consulted to dose Vancomycin for possible pneumonia.    PMH: COPD, asthma, CHF, Recent hospitalization 12/10-12/17 for PNA and discharged on O2 and PO Bactrim x7 days total    Sputum culture (+) for 2+ pseudomonas aeruginosa 12/14      Assessment/Plan    1. Day #1 Vancomycin: Goal -600 mcg*h/mL. Will order vancomycin 1500 mg (~21 mg/kg ABW) IV x1 loading dose followed by vancomycin 1000 mg (~14 mg/kg ABW) IV q12h. Random level ordered for 12/24 at 1200 prior to 3rd dose.    2. Day #1 Cefepime: 2g IV q8h for estCrCl > 60 mL/min.    Will continue to monitor drug levels, renal function, culture and sensitivities, and patient clinical status.       Objective:  Relevant clinical data and objective history reviewed:  172.7 cm (68\")   70 kg (154 lb 5.2 oz)   Ideal body weight: 68.4 kg (150 lb 12.7 oz)  Adjusted ideal body weight: 69 kg (152 lb 3.3 oz)  Body mass index is 23.46 kg/m².        Results from last 7 days   Lab Units 12/23/22  0944 12/17/22  0445   CREATININE mg/dL 1.00 1.04     Estimated Creatinine Clearance: 70 mL/min (by C-G formula based on SCr of 1 mg/dL).  No intake/output data recorded.    Results from last 7 days   Lab Units 12/23/22  0944 12/16/22  2355   WBC 10*3/mm3 13.30* 19.00*     Temperature    12/23/22 0957   Temp: 98 °F (36.7 °C)     Baseline culture/source/susceptibility:  Microbiology Results (last 10 days)       Procedure Component Value - Date/Time    Respiratory Culture - Sputum, Cough [[id number]]  (Abnormal)  (Susceptibility) Collected: 12/14/22 2206    Lab Status: Final result Specimen: Sputum from Cough Updated: 12/17/22 0939     Respiratory Culture Light growth (2+) Pseudomonas aeruginosa      Scant growth (1+) Normal Respiratory Melinda     Gram Stain Moderate (3+) WBCs per low power field      Rare (1+) Epithelial cells per low power field       Rare (1+) Mixed bacterial morphotypes seen on Gram Stain    Susceptibility        Pseudomonas aeruginosa      ALEXYS      Cefepime Susceptible      Ceftazidime Susceptible      Ciprofloxacin Susceptible      Gentamicin Susceptible      Levofloxacin Susceptible      Piperacillin + Tazobactam Susceptible                                   Anti-Infectives (From admission, onward)      Ordered     Dose/Rate Route Frequency Start Stop    12/23/22 1159  vancomycin (VANCOCIN) 1,000 mg in sodium chloride 0.9 % 250 mL IVPB        Ordering Provider: Kye Avelar MD    1,000 mg Intravenous Every 12 Hours 12/24/22 0100 12/30/22 1259    12/23/22 1126  piperacillin-tazobactam (ZOSYN) IVPB 3.375 g in 100 mL NS (CD)        Ordering Provider: Kye Avelar MD    3.375 g  over 4 Hours Intravenous Every 8 Hours 12/23/22 1815 12/30/22 1814    12/23/22 1159  vancomycin 1500 mg/500 mL 0.9% NS IVPB ([facility])        Ordering Provider: Kye Avelar MD    20 mg/kg × 70 kg Intravenous Once 12/23/22 1300      12/23/22 1126  piperacillin-tazobactam (ZOSYN) IVPB 3.375 g in 100 mL NS (CD)        Ordering Provider: Kye Avelar MD    3.375 g  over 30 Minutes Intravenous Once 12/23/22 1215      12/23/22 1135  Pharmacy to dose vancomycin        Ordering Provider: Marquita Stanford APRN     Does not apply Continuous PRN 12/23/22 1135 12/30/22 1134    12/23/22 1135  Pharmacy to Dose Zosyn        Ordering Provider: Marquita Stanford APRN     Does not apply Continuous PRN 12/23/22 1134 12/30/22 1133    12/23/22 1126  Pharmacy to dose vancomycin        Ordering Provider: Kye Avelar MD     Does not apply Continuous PRN 12/23/22 1123 12/30/22 1122            Milton Arzate RPH  12/23/22 11:59 EST

## 2022-12-23 NOTE — CASE MANAGEMENT/SOCIAL WORK
Discharge Planning Assessment  [facility]     Patient Name: Fabian Manjarrez  MRN: [id number]  Today's Date: 12/23/2022    Admit Date: 12/23/2022    Plan: From home with wife. Referral to [facility] pending. No precert required.   Discharge Needs Assessment     Row Name 12/23/22 1356       Living Environment    People in Home spouse;child(erasmo), adult    Name(s) of People in Home Wife-Wilton, Daughter-Michelle    Current Living Arrangements home    Primary Care Provided by self    Provides Primary Care For no one    Family Caregiver if Needed spouse;child(erasmo), adult    Family Caregiver Names Wife-Wilton, Daughter-Michelle    Quality of Family Relationships helpful;involved;supportive    Able to Return to Prior Arrangements yes       Resource/Environmental Concerns    Resource/Environmental Concerns none    Transportation Concerns none       Transition Planning    Patient/Family Anticipates Transition to inpatient rehabilitation facility    Patient/Family Anticipated Services at Transition skilled nursing    Transportation Anticipated family or friend will provide       Discharge Needs Assessment    Readmission Within the Last 30 Days other (see comments)  Previous hospitalization 12/10-12/17    Current Outpatient/Agency/Support Group homecare agency    Equipment Currently Used at Home oxygen;rollator    Concerns to be Addressed care coordination/care conferences;discharge planning    Anticipated Changes Related to Illness none    Equipment Needed After Discharge none    Outpatient/Agency/Support Group Needs skilled nursing facility    Discharge Facility/Level of Care Needs nursing facility, skilled    Provided Post Acute Provider List? N/A    Provided Post Acute Provider Quality & Resource List? N/A               Discharge Plan     Row Name 12/23/22 1418       Plan    Plan From home with wife. Referral to [facility] pending. No precert required.    Patient/Family in Agreement with Plan yes    Plan Comments CM met with  patient and wife at bedside. They report they are currently living with daughter as they build their home. PCP and pharmacy confirmed. Only DME is O2 (supplied by [employer]) and rollator. Pt was set up with [facility] during previous hospitalization. Pt had nurse visit 12/21 and OT visit 12/22 in which he had low BP and was sent to [facility] ED. Pt is now interested and agreeable to SNF placement. Reviewed facilities with wife that are in proximity to daughter's home. Choices are 1) [facility] and 2) [facility] ([place], [place]). Reviewed IMM letter, obtained wife's signature, and provided her with copy. Referral sent to [facility] and liaison Stacia. Wife and daughter to hopefully provide transportation at discharge.              Continued Care and Services - Admitted Since 12/23/2022     Destination     Service Provider Request Status Selected Services Address Phone Fax Patient Preferred    [facility] Pending - Request Sent N/A [address] [place] [address] [phone]           Home Medical Care     Service Provider Request Status Selected Services Address Phone Fax Patient Preferred    [facility] Pending - No Request Sent N/A [address] [place] [address] [phone] -- --               Demographic Summary     Row Name 12/23/22 135       General Information    Admission Type inpatient    Arrived From hospital    Required Notices Provided Important Message from Medicare    Referral Source admission list    Reason for Consult discharge planning;care coordination/care conference    Preferred Language English       Contact Information    Permission Granted to Share Info With                Functional Status     Row Name 12/23/22 2536       Functional Status    Usual Activity Tolerance moderate    Current Activity Tolerance moderate       Functional Status, IADL    Medications assistive person    Meal Preparation assistive  person    Housekeeping assistive person    Laundry assistive person    Shopping assistive person               Patient Forms     Row Name 12/23/22 1423       Patient Forms    Important Message from Medicare (IMM) Delivered  IMM 12/23 per CM    Delivered to Support person  Wife-Wilton    Method of delivery In person              Case Management Readmission Assessment Note    Case Management Readmission Assessment (all recorded)     Readmission Interview     Row Name 12/23/22 8747             Readmission Indications    Is this hospitalization related to the prior hospital diagnosis? No      What was the reason you were admitted? Pt was hospitalized 12/11-12/17 for Afib with RVR, acute on chronic resp failure. Pt returned 12/23 for hypotension, possible PNA.      Row Name 12/23/22 9041             Recommendation for rehospitalization    Did you speak with your physician prior to coming to the hospital No      Who recommended you return to the hospital?  Agency RN  [facility] OT, low BP reading      Did you seek care elsewhere prior to coming to the hospital? Yes  [facility]      Row Name 12/23/22 1823             Follow up appointment    Do you have a PCP? Yes  Dr. Paris      Did you have an appointment with PCP/specialist after hospitalization within 7 days? No      Did you keep your appointment? No      If you did not keep appointment, why? Patient illness      Row Name 12/23/22 1386             Medications    Did you have newly prescribed medications at discharge? Yes  Amio, lisinopril, Xarelto, nystatin, Bactrim      Did you understand the reasons for your medications at discharge and how to take them? Yes      Did you understand the side effects of your medications? Yes      Are you taking all of you prescribed medications? Yes      Row Name 12/23/22 1895             Discharge Instructions    Did you understand your discharge instructions? Yes      Did your family/caregiver hear your instructions? Yes      Were you  told to eat a special diet? No      Did you adhere to the diet? Yes      Were you given a number of someone to call if you had questions or concerns? Yes      Row Name 12/23/22 6178             Index discharge location/services    Where did you go upon discharge? Home with services      Do you have supportive family or friends in the home? Yes      What services were arranged at discharge? Home Health;DME      Row Name 12/23/22 9587             Discharge Readiness    On a scale of 1-5 (5 being well prepared), how ready were you for discharge 5                Met with patient in room wearing PPE: mask, face shield/goggles.      Maintained distance greater than six feet and spent less than 15 minutes in the room.      Megan Naegele, RN      Office Phone: [phone]  Office Cell: [phone]

## 2022-12-24 NOTE — PROGRESS NOTES
Pharmacy Antimicrobial Dosing Service    Subjective:  Fabian Manjarrez is a 67 y.o.male admitted with generalized weakness. Pharmacy has been consulted to dose Vancomycin for possible pneumonia. ID consulted 12/24 afternoon.     PMH: COPD, asthma, CHF, Recent hospitalization 12/10-12/17 for PNA and discharged on O2 and PO Bactrim x7 days total    12/14 Sputum: pseudomonas aeruginosa (pan-susceptible)   12/23 MRSA nares: negative       Assessment/Plan    1. Day #2 Vancomycin: Goal -600 mcg*h/mL. Received 1500 mg (~21 mg/kg ABW) IV x1 followed by 1000 mg (~14 mg/kg ABW) IV q12h. Bayesian modeling predicts supratherapeutic AUC/Trough on current regimen, population half-life ~16 hrs. Therefore will empirically adjust dose to 1250 mg (~18 mg/kg ABW) IV q24h. Will order trough on 12/26 if therapy continued.     2. Day #2 Cefepime: 2g IV q8h for estCrCl > 60 mL/min.    Will continue to monitor drug levels, renal function, culture and sensitivities, and patient clinical status.       Objective:  Relevant clinical data and objective history reviewed:  172.7 cm (68\")   71.6 kg (157 lb 13.6 oz)   Ideal body weight: 68.4 kg (150 lb 12.7 oz)  Adjusted ideal body weight: 69.7 kg (153 lb 9.9 oz)  Body mass index is 24 kg/m².        Results from last 7 days   Lab Units 12/23/22  2300 12/23/22  0944   CREATININE mg/dL 1.10 1.00     Estimated Creatinine Clearance: 65.1 mL/min (by C-G formula based on SCr of 1.1 mg/dL).  I/O last 3 completed shifts:  In: 480 [P.O.:480]  Out: 1850 [Urine:1850]    Results from last 7 days   Lab Units 12/23/22  2300 12/23/22  0944   WBC 10*3/mm3 15.20* 13.30*     Temperature    12/24/22 0105 12/24/22 0530 12/24/22 1135   Temp: 98.4 °F (36.9 °C) 99.7 °F (37.6 °C) 99.4 °F (37.4 °C)     Baseline culture/source/susceptibility:  Microbiology Results (last 10 days)       Procedure Component Value - Date/Time    MRSA Screen, PCR (Inpatient) - Swab, Nares [[id number]]  (Normal) Collected: 12/23/22 1313    Lab  Status: Final result Specimen: Swab from Nares Updated: 12/23/22 1445     MRSA PCR No MRSA Detected    Narrative:      The negative predictive value of this diagnostic test is high and should only be used to consider de-escalating anti-MRSA therapy. A positive result may indicate colonization with MRSA and must be correlated clinically.    Blood Culture - Blood, Arm, Right [[id number]]  (Normal) Collected: 12/23/22 1219    Lab Status: Preliminary result Specimen: Blood from Arm, Right Updated: 12/24/22 1316     Blood Culture No growth at 24 hours    Respiratory Culture - Sputum, Cough [[id number]]  (Abnormal)  (Susceptibility) Collected: 12/14/22 2206    Lab Status: Final result Specimen: Sputum from Cough Updated: 12/17/22 0939     Respiratory Culture Light growth (2+) Pseudomonas aeruginosa      Scant growth (1+) Normal Respiratory Melinda     Gram Stain Moderate (3+) WBCs per low power field      Rare (1+) Epithelial cells per low power field      Rare (1+) Mixed bacterial morphotypes seen on Gram Stain    Susceptibility        Pseudomonas aeruginosa      ALEXYS      Cefepime Susceptible      Ceftazidime Susceptible      Ciprofloxacin Susceptible      Gentamicin Susceptible      Levofloxacin Susceptible      Piperacillin + Tazobactam Susceptible                                   Anti-Infectives (From admission, onward)      Ordered     Dose/Rate Route Frequency Start Stop    12/24/22 0910  vancomycin 1250 mg/250 mL 0.9% NS IVPB ([facility])        Ordering Provider: Willy Sierra MD    1,250 mg Intravenous Every 24 Hours 12/25/22 0100 12/30/22 0059    12/23/22 1229  cefepime 2 gm IVPB in 100 ml NS ([facility])        Ordering Provider: Kye Avelar MD    2 g  over 4 Hours Intravenous Every 8 Hours 12/23/22 2200 12/30/22 1359    12/23/22 1229  cefepime 2 gm IVPB in 100 ml NS ([facility]BP)        Ordering Provider: Kye Avelar MD    2 g  over 30 Minutes Intravenous Once 12/23/22 1330 12/23/22 1550    12/23/22 1159  vancomycin  1500 mg/500 mL 0.9% NS IVPB ([facility])        Ordering Provider: Kye Avelar MD    20 mg/kg × 70 kg Intravenous Once 12/23/22 1300 12/23/22 1553    12/23/22 1230  Pharmacy to Dose Cefepime        Ordering Provider: Kye Avelar MD     Does not apply Continuous PRN 12/23/22 1230 12/30/22 1229    12/23/22 1126  Pharmacy to dose vancomycin        Ordering Provider: Kye Avelar MD     Does not apply Continuous PRN 12/23/22 1123 12/30/22 1122            Marquita Hooks, PharmD  12/24/22 14:43 EST

## 2022-12-24 NOTE — PROGRESS NOTES
PULMONARY CRITICAL CARE PROGRESS  NOTE      PATIENT IDENTIFICATION:  Name: Fabian Manjarrez  MRN: [id number]  :  1955     Age: 67 y.o.  Sex: male    DATE OF Note:  2022   Referring Physician: Kye Avelar MD                  Subjective:   Feeling better but requiring Airvo 40/100, no new issue, still SOB, no chest pain, no nausea or vomiting, no change in bowel habit, no dysuria,  no new  skin rash or itching.      Objective:  tMax 24 hrs: Temp (24hrs), Av.5 °F (36.9 °C), Min:97.8 °F (36.6 °C), Max:99.7 °F (37.6 °C)      Vitals Ranges:   Temp:  [97.8 °F (36.6 °C)-99.7 °F (37.6 °C)] 99.7 °F (37.6 °C)  Heart Rate:  [70-75] 75  Resp:  [14-18] 15  BP: ()/(45-67) 115/55    Intake and Output Last 3 Shifts:   I/O last 3 completed shifts:  In: 480 [P.O.:480]  Out: 1850 [Urine:1850]    Exam:  /55 (BP Location: Left arm, Patient Position: Lying)   Pulse 75   Temp 99.7 °F (37.6 °C) (Axillary)   Resp 15   Ht 172.7 cm (68\")   Wt 71.6 kg (157 lb 13.6 oz)   SpO2 93%   BMI 24.00 kg/m²     General Appearance:     HEENT:  Normocephalic, without obvious abnormality. Conjunctivae/corneas clear.  Normal external ear canals. Nares normal, no drainage     Neck:  Supple, symmetrical, trachea midline. No JVD.  Lungs /Chest wall:   Bilateral basal rhonchi, respirations unlabored while on air Vo, symmetrical wall movement.     Heart:  Regular rate and rhythm, systolic murmur PMI left sternal border  Abdomen: Soft, nontender, no masses, no organomegaly.    Extremities: Trace edema, no clubbing or cyanosis        Medications:  amiodarone, 200 mg, Oral, Q12H  aspirin, 81 mg, Oral, Daily  buPROPion SR, 100 mg, Oral, Daily  cefepime, 2 g, Intravenous, Q8H  ferrous sulfate, 324 mg, Oral, Daily With Breakfast  FLUoxetine, 40 mg, Oral, Daily  guaiFENesin, 600 mg, Oral, Daily  ipratropium-albuterol, 3 mL, Nebulization, 4x Daily - RT  pantoprazole, 40 mg, Oral, BID  predniSONE, 40 mg, Oral, Daily With  Breakfast  pregabalin, 75 mg, Oral, BID  ranolazine, 1,000 mg, Oral, BID  rivaroxaban, 20 mg, Oral, Daily With Dinner  rosuvastatin, 20 mg, Oral, Nightly  sodium chloride, 10 mL, Intravenous, Q12H  tamsulosin, 0.4 mg, Oral, Daily  [START ON 12/25/2022] vancomycin, 1,250 mg, Intravenous, Q24H  vitamin B-12, 1,000 mcg, Oral, Daily        Infusion:  Pharmacy to Dose Cefepime,   Pharmacy to dose vancomycin,   sodium chloride, 75 mL/hr, Last Rate: 75 mL/hr (12/24/22 0903)         PRN:  •  acetaminophen **OR** acetaminophen **OR** acetaminophen  •  HYDROcodone-acetaminophen  •  magnesium sulfate **OR** magnesium sulfate **OR** magnesium sulfate  •  nitroglycerin  •  ondansetron  •  Pharmacy to Dose Cefepime  •  Pharmacy to dose vancomycin  •  potassium & sodium phosphates **OR** potassium & sodium phosphates  •  potassium chloride  •  potassium chloride  •  sodium chloride  •  sodium chloride  Data Review:  All labs (24hrs):   Recent Results (from the past 24 hour(s))   Blood Gas, Arterial -    Collection Time: 12/23/22 12:06 PM    Specimen: Arterial Blood   Result Value Ref Range    Site Right Brachial     Gildardo's Test N/A     pH, Arterial 7.413 7.350 - 7.450 pH units    pCO2, Arterial 30.4 (L) 35.0 - 48.0 mm Hg    pO2, Arterial 63.4 (L) 83.0 - 108.0 mm Hg    HCO3, Arterial 19.4 (L) 21.0 - 28.0 mmol/L    Base Excess, Arterial -4.5 (L) 0.0 - 3.0 mmol/L    O2 Saturation, Arterial 92.5 (L) 94.0 - 98.0 %    CO2 Content 20.3 (L) 22 - 29 mmol/L    Barometric Pressure for Blood Gas      Modality HFNC     FIO2 44 %    Hemodilution No    Lactic Acid, Plasma    Collection Time: 12/23/22 12:19 PM    Specimen: Arm, Right; Blood   Result Value Ref Range    Lactate 1.3 0.5 - 2.0 mmol/L   MRSA Screen, PCR (Inpatient) - Swab, Nares    Collection Time: 12/23/22  1:13 PM    Specimen: Nares; Swab   Result Value Ref Range    MRSA PCR No MRSA Detected No MRSA Detected   Magnesium    Collection Time: 12/23/22 11:00 PM    Specimen: Blood    Result Value Ref Range    Magnesium 1.5 (L) 1.6 - 2.4 mg/dL   Basic Metabolic Panel    Collection Time: 12/23/22 11:00 PM    Specimen: Blood   Result Value Ref Range    Glucose 157 (H) 65 - 99 mg/dL    BUN 12 8 - 23 mg/dL    Creatinine 1.10 0.76 - 1.27 mg/dL    Sodium 131 (L) 136 - 145 mmol/L    Potassium 4.1 3.5 - 5.2 mmol/L    Chloride 101 98 - 107 mmol/L    CO2 20.0 (L) 22.0 - 29.0 mmol/L    Calcium 8.4 (L) 8.6 - 10.5 mg/dL    BUN/Creatinine Ratio 10.9 7.0 - 25.0    Anion Gap 10.0 5.0 - 15.0 mmol/L    eGFR 73.1 >60.0 mL/min/1.73   CBC Auto Differential    Collection Time: 12/23/22 11:00 PM    Specimen: Blood   Result Value Ref Range    WBC 15.20 (H) 3.40 - 10.80 10*3/mm3    RBC 2.38 (L) 4.14 - 5.80 10*6/mm3    Hemoglobin 7.7 (L) 13.0 - 17.7 g/dL    Hematocrit 23.1 (L) 37.5 - 51.0 %    MCV 96.9 79.0 - 97.0 fL    MCH 32.6 26.6 - 33.0 pg    MCHC 33.6 31.5 - 35.7 g/dL    RDW 16.5 (H) 12.3 - 15.4 %    RDW-SD 59.9 (H) 37.0 - 54.0 fl    MPV 7.5 6.0 - 12.0 fL    Platelets 268 140 - 450 10*3/mm3    Neutrophil % 88.6 (H) 42.7 - 76.0 %    Lymphocyte % 5.7 (L) 19.6 - 45.3 %    Monocyte % 5.6 5.0 - 12.0 %    Eosinophil % 0.0 (L) 0.3 - 6.2 %    Basophil % 0.1 0.0 - 1.5 %    Neutrophils, Absolute 13.50 (H) 1.70 - 7.00 10*3/mm3    Lymphocytes, Absolute 0.90 0.70 - 3.10 10*3/mm3    Monocytes, Absolute 0.80 0.10 - 0.90 10*3/mm3    Eosinophils, Absolute 0.00 0.00 - 0.40 10*3/mm3    Basophils, Absolute 0.00 0.00 - 0.20 10*3/mm3    nRBC 0.0 0.0 - 0.2 /100 WBC        Imaging:  XR Chest 1 View  Narrative: XR CHEST 1 VW    Date of Exam: 12/23/2022 9:55 AM EST    Indication: Recent PNA.    Comparison: 12/15/2022 at 12:12 PM    Findings:  Mild worsening ill-defined infiltrates with interstitial changes are seen bilaterally. Scattered granulomas are again noted. The cardiac silhouette and mediastinum are stable. Postoperative changes are noted. A left cardiac pacemaker/ICD is noted with   leads intact. No acute osseous abnormalities are  observed.  Impression: Impression:  1.Mild worsening of ill-defined infiltrates bilaterally with interstitial changes.    Electronically Signed: Yuniel Timmons MD    12/23/2022 10:24 AM EST    Workstation ID: [id number]       ASSESSMENT:  Generalized weakness  Acute respiratory failure with hypoxia: Pneumonia versus acute exacerbation of ILD  Chronic Obstructive Pulmonary Disease  -recently discharged from [facility] on 2L oxygen      Interstitial Lung Disease: related to RA:  on 12/16/2022  CT of the chest 12/12/2022 shows UIP with honeycombing and bronchiectasis   -recently treated with tapering dose of prednisone      Rheumatoid arthritis, treated with Leflunomide (Arava), methotrexate and hydroxychloroquine but did not tolerated, and also taking chronic low-dose prednsione  -Was followed by a rheumatologist in [place]     Atrial Fibrillation with RVR  SSS s/p pacer     12/11/2022 ECHO  EF 40%  RVSP 22.6 mmHg  -mild AV stenosis  Right ventricle severely dilated      Hypertension  GERD     Recommendations:  Prednisone 40 mg daily and once patient improves will consider starting mycophenolate/CellCept  Oxygen supplement and titration to maintain saturation 90 to 95%  Bronchodilatores  Antibiotics: Cefepime, the patient had respiratory culture positive for Pseudomonas on 12/14/2022 which was pansensitive   DC vancomycin since the nasal MRSA swab is negative  Heart rate control  Anticoagulation: Xarelto  DVT/GI prophylaxis  Check daily labs and correct electrolytes as needed     Discussed with Dr Robert Moreno, LE  12/24/2022  12:03 EST    The NP scribed the note for me, I have examined the patient and reviewed and verified the above findings and and I formulated the assessment and plan as documented

## 2022-12-24 NOTE — PROGRESS NOTES
[facility] Medicine Services Daily Progress Note    Patient Name: Fabian Manjarrez  : 1955  MRN: [id number]  Primary Care Physician:  Addison Burdick MD  Date of admission: 2022      Subjective      Chief Complaint: Shortness of breath      Patient Reports he still having some trouble breathing.  O2 sat 89% during my evaluation.  Breathing treatments ordered.  Growing Pseudomonas in culture.  Spirometer and flutter valve requested    ROS negative except as above      Objective      Vitals:   Temp:  [97.8 °F (36.6 °C)-99.7 °F (37.6 °C)] 99.4 °F (37.4 °C)  Heart Rate:  [70-76] 71  Resp:  [14-20] 19  BP: ()/(47-67) 90/47  Flow (L/min):  [6-40] 40    Physical Exam  Vitals reviewed.   Constitutional:       Comments: Wife at bedside  Patient wearing nonrebreather and high flow nasal cannula  Saturating 89%     HENT:      Head: Normocephalic.   Cardiovascular:      Rate and Rhythm: Normal rate.   Pulmonary:      Effort: Pulmonary effort is normal.      Breath sounds: Rhonchi present.   Abdominal:      General: Abdomen is flat.      Palpations: Abdomen is soft.   Musculoskeletal:         General: Normal range of motion.      Cervical back: Normal range of motion.   Skin:     General: Skin is warm.   Neurological:      General: No focal deficit present.      Mental Status: He is alert and oriented to person, place, and time.   Psychiatric:         Mood and Affect: Mood normal.         Behavior: Behavior normal.             Result Review    Result Review:  I have personally reviewed the results from the time of this admission to 2022 13:06 EST and agree with these findings:  [x]  Laboratory  []  Microbiology  []  Radiology  []  EKG/Telemetry   []  Cardiology/Vascular   []  Pathology  []  Old records  []  Other:  Most notable findings include: Hemoglobin 7.7.  Pseudomonas in respiratory culture          Assessment & Plan      Brief Patient Summary:  Fabian Manjarrez is a 67 y.o.  male who presents with Pseudomonas pneumonia acute on chronic respiratory failure      amiodarone, 200 mg, Oral, Q12H  aspirin, 81 mg, Oral, Daily  buPROPion SR, 100 mg, Oral, Daily  cefepime, 2 g, Intravenous, Q8H  ferrous sulfate, 324 mg, Oral, Daily With Breakfast  FLUoxetine, 40 mg, Oral, Daily  guaiFENesin, 600 mg, Oral, Daily  ipratropium-albuterol, 3 mL, Nebulization, 4x Daily - RT  pantoprazole, 40 mg, Oral, BID  predniSONE, 40 mg, Oral, Daily With Breakfast  pregabalin, 75 mg, Oral, BID  ranolazine, 1,000 mg, Oral, BID  rivaroxaban, 20 mg, Oral, Daily With Dinner  rosuvastatin, 20 mg, Oral, Nightly  sodium chloride, 10 mL, Intravenous, Q12H  tamsulosin, 0.4 mg, Oral, Daily  [START ON 12/25/2022] vancomycin, 1,250 mg, Intravenous, Q24H  vitamin B-12, 1,000 mcg, Oral, Daily       Pharmacy to Dose Cefepime,   Pharmacy to dose vancomycin,   sodium chloride, 75 mL/hr, Last Rate: 75 mL/hr (12/24/22 0903)         Active Hospital Problems:  Active Hospital Problems    Diagnosis    • **Acute on chronic respiratory failure with hypercapnia ([facility])    • Generalized weakness    • ILD (interstitial lung disease) ([facility])    • HCAP (healthcare-associated pneumonia)    • Chronic HFrEF (heart failure with reduced ejection fraction) ([facility])    • Physical debility    • COPD (chronic obstructive pulmonary disease) ([facility])    • GERD without esophagitis    • Coronary artery disease involving autologous vein coronary bypass graft without angina pectoris    • Primary hypertension    • Atrial fibrillation with rapid ventricular response ([facility])      Plan:   Respiratory distress  COPD  Interstitial lung disease  - CXR reviewed: Worsening.  Likely due to Pseudomonas pneumonia  - Currently requiring 6L HFNC, continue to wean in order to maintain O2 sat> 90%  - DuoNebs and Pulmicort  - Incentive spirometer  - Prednisone and Mucinex  - Pulmonary consulted  - Transferred to PCU  - Zosyn and vancomycin  - ABG ordered  -Spirometer and flutter valve  ordered.  Duo nebs ordered    Hypotension  H/O HTN  CHF   - According to nursing staff, patient received 3.5L NS at OLF  - proBNP ordered  - Currently hypotensive but appears to be chronic after chart review  - Hold home BP meds  -Improved in December 24    Acute on chronic anemia  Occult stool ordered  Monitor for now may need transfusion if below 7.4 due to respiratory failure     Generalized weakness  History of anemia  - PT/OT consulted for possible placement for inpatient rehab  - Labs reviewed  - Continue ferrous sulfate  - Monitor daily labs     Urinary hesitancy  - Sanford catheter at bedside  - Flomax  - Patient to follow-up with urology after discharge as previously planned     Primary osteoarthritis  Rheumatoid arthritis  - Prednisone 40 mg daily for RA, according to wife  - Lyrica  - Norco 10 mg as needed for pain     A. fib  - Amiodarone and Xarelto  - EKG ordered     HLD  - Lipid profile ordered  - Zetia     DVT prophylaxis  -On Xarelto     CODE STATUS: Full code  Level Of Support Discussed With: Patient  Code Status (Patient has no pulse and is not breathing): CPR (Attempt to Resuscitate)  Medical Interventions (Patient has pulse or is breathing): Full Support     Admission Status:  I believe this patient meets inpatient status.     I discussed the patient's findings and my recommendations with patient and family.    DVT prophylaxis:  Medical and mechanical DVT prophylaxis orders are present.    CODE STATUS:    Level Of Support Discussed With: Patient  Code Status (Patient has no pulse and is not breathing): CPR (Attempt to Resuscitate)  Medical Interventions (Patient has pulse or is breathing): Full Support      Disposition:  I expect patient to be discharged once stable.    This patient has been examined wearing appropriate Personal Protective Equipment and discussed with Patient and wife and nursing. 12/24/22      Electronically signed by Willy Sierra MD, 12/24/22, 13:06 EST.  Rosemarie Gustafson  Hospitalist Team

## 2022-12-24 NOTE — OUTREACH NOTE
CHF Week 2 Survey    Flowsheet Row Responses   [facility] discharged from? [facility]   Does the patient have one of the following disease processes/diagnoses(primary or secondary)? CHF   Week 2 attempt successful? No   Unsuccessful attempts Attempt 1   Revoke Readmitted          QUINN HERNANDEZ - Registered Nurse

## 2022-12-24 NOTE — SIGNIFICANT NOTE
Pt declined PT today.  Pt is now on 12 HF nc with NRB for back up.  Pt stated it took his quite a while to recover from repositioning in the bed.  He stated he was just not up for PT today.  Nursing aware.

## 2022-12-25 NOTE — DISCHARGE SUMMARY
[facility] Medicine Services  Death SUMMARY    Patient Name: Fabian Manjarrez  : 1955  MRN: [id number]    Discharge condition:   Date of Admission: 2022  Discharge Diagnosis: Pseudomonas pneumonia respiratory failure  Date of death: 2022  Primary Care Physician: Addison Burdick MD    Principal cause of death: Acute respiratory failure secondary to Pseudomonas pneumonia        Presenting Problem:   Generalized weakness [R53.1]    Active and Resolved Hospital Problems:  Active Hospital Problems    Diagnosis POA   • **Acute on chronic respiratory failure with hypercapnia ([facility]) [J96.22] Yes   • Generalized weakness [R53.1] Yes   • ILD (interstitial lung disease) ([facility]) [J84.9] Yes   • HCAP (healthcare-associated pneumonia) [J18.9] Yes   • Chronic HFrEF (heart failure with reduced ejection fraction) ([facility]) [I50.22] Yes   • Physical debility [R53.81] Yes   • COPD (chronic obstructive pulmonary disease) ([facility]) [J44.9] Yes   • GERD without esophagitis [K21.9] Yes   • Coronary artery disease involving autologous vein coronary bypass graft without angina pectoris [I25.810] Yes   • Primary hypertension [I10] Yes   • Atrial fibrillation with rapid ventricular response ([facility]) [I48.91] Yes      Resolved Hospital Problems   No resolved problems to display.         Hospital Course     Hospital Course:  Fabian Manjarrez is a 67 y.o. male who presented to the hospital on  with severe respiratory distress.  Patient was diagnosed with pneumonia and provided with supplemental oxygen and antibiotics.  Pulmonary and infectious diseases were consulted.  Cultures began to grow Pseudomonas.  The patient was having high oxygen requirements and was on nonrebreather and high flow nasal cannula.  Patient required breathing treatments and seemed to be doing better on  with oxygen levels improving into the high 90s with breathing treatments.  Early  the patient  went into severe respiratory distress quite acutely around midnight and became unresponsive.  He was being transferred to the ICU for intubation and higher level of care at which point he went into cardiac arrest and ACLS was initiated.  After discussion with patient's wife CPR was stopped per her wishes and the patient was allowed to  with dignity.        Time spent on Discharge including face to face service:  30 minutes   22      Signature: Willy Sierra MD

## 2022-12-25 NOTE — NURSING NOTE
Pt on 50L at 95% on precision flow at start of shift. Pt reporting increased anxiety and difficulty breathing w/O2 sats decreasing and sustaining in 70s. 15L NRB applied in addition to precision flow but pt maintained in 70s. Pt and spouse consulted about bipap application or possible intubation. Pt agreeable to intubation and transferred to ICU.

## 2022-12-25 NOTE — PLAN OF CARE
Goal Outcome Evaluation:      Pt oxygen demand more stable on airvo 40L / 100% O2, he appears more relaxed and does not desaturate as readily. Pt's Mg+ was treated with PRN meds.

## 2022-12-25 NOTE — PROCEDURES
Intubation    Date/Time: 2022 4:42 AM  Performed by: Susie Stanford APRN  Authorized by: Susie Stanford APRN   Consent: The procedure was performed in an emergent situation.  Risks and benefits discussed: Patient coding.  Indications: respiratory failure and  airway protection  Intubation method: direct  Preoxygenation: BVM  Laryngoscope size: Velasquez 4  Tube size: 8.0 mm  Tube type: cuffed  Number of attempts: 1  Ventilation between attempts: BVM  Cords visualized: yes  Post-procedure assessment: chest rise and CO2 detector  Breath sounds: equal and absent over the epigastrium  Tube secured with: ETT fernandez  Chest x-ray interpreted by: No chest x-ray. Patient's wife stopped CODE to allow patient to .       Electronically signed by LE Cruz, 22, 4:46 AM EST.

## 2022-12-25 NOTE — CODE DOCUMENTATION
RR called for respiratory distress.  Upon my arrival ABG was being retrieved.  Patient oxygen saturation in the 70s on airvo.  Spoke with wife and patient about the bipap and intubation.  Patient wanted to be intubated.  Called and spoke with LE Stanford upon receiving the ABGs with a PaO2 of 38.5 stating we needed to intubate and moving patient to ICU.  Immediately started to transfer with monitor and O2 to the ICU unit.

## 2022-12-26 LAB
HBA1C MFR BLD: 5.4 % (ref 3.5–5.6)
QT INTERVAL: 462 MS
QT INTERVAL: 499 MS

## 2022-12-27 NOTE — PROGRESS NOTES
Enter Query Response Below      Query Response:     acute respiratory failure with hypercapnia was not supported    Electronically signed by Willy Sierra MD, 22, 8:54 AM EST.         If applicable, please update the problem list.      Patient: Fabian Manjarrez        : 1955  Account: [id number]           Admit Date: 2022        How to Respond to this query:       a. Click New Note     b. Answer query within the yellow box.                c. Update the Problem List, if applicable.      If you have any questions about this query contact me at: [phone]     Dr. Sierra:    67 y.o. male presented  with severe respiratory distress and diagnosed with pseudomonas pneumonia.  ABG results pH 7.413, pC02 30.4, p02 63.4, 02 sat 92.5%, FI02 44% HFNC.  Progress note states \"acute on chronic respiratory failure with hypercapnia.\"  On  patient went into severe respiratory distress and became unresponsive.  ABG results on  - pH 7.406, pC02 24.7, p02 38.5, FI02 100% NRB.  Patient  on .      After study, was acute respiratory failure with hypercapnia clinically supported during this admission?    - acute respiratory failure with hypercapnia was supported with additional clinical indicators: ______________  - acute respiratory failure with hypercapnia was not supported  - other - specify: __________________  - unable to determine     By submitting this query, we are merely seeking further clarification of documentation to accurately reflect all conditions that you are monitoring, evaluating, treating or that extend the hospitalization or utilize additional resources of care. Please utilize your independent clinical judgment when addressing the question(s) above.     This query and your response, once completed, will be entered into the legal medical record.    Sincerely,  Sommer Hughes RN, MsN  Clinical Documentation Integrity Program   [email or web address]

## 2022-12-28 LAB
BACTERIA SPEC AEROBE CULT: NORMAL
BACTERIA SPEC AEROBE CULT: NORMAL